# Patient Record
Sex: MALE | Race: WHITE | Employment: FULL TIME | URBAN - NONMETROPOLITAN AREA
[De-identification: names, ages, dates, MRNs, and addresses within clinical notes are randomized per-mention and may not be internally consistent; named-entity substitution may affect disease eponyms.]

---

## 2017-02-20 ENCOUNTER — OFFICE VISIT (OUTPATIENT)
Dept: FAMILY MEDICINE CLINIC | Age: 42
End: 2017-02-20

## 2017-02-20 VITALS
RESPIRATION RATE: 12 BRPM | WEIGHT: 280 LBS | DIASTOLIC BLOOD PRESSURE: 84 MMHG | SYSTOLIC BLOOD PRESSURE: 124 MMHG | HEART RATE: 50 BPM | TEMPERATURE: 98.1 F | BODY MASS INDEX: 35.94 KG/M2 | HEIGHT: 74 IN

## 2017-02-20 DIAGNOSIS — E66.9 OBESITY (BMI 30-39.9): ICD-10-CM

## 2017-02-20 LAB — HBA1C MFR BLD: 8.6 %

## 2017-02-20 PROCEDURE — 83036 HEMOGLOBIN GLYCOSYLATED A1C: CPT | Performed by: FAMILY MEDICINE

## 2017-02-20 PROCEDURE — 99213 OFFICE O/P EST LOW 20 MIN: CPT | Performed by: FAMILY MEDICINE

## 2017-02-20 RX ORDER — PIOGLITAZONEHYDROCHLORIDE 30 MG/1
30 TABLET ORAL DAILY
Qty: 30 TABLET | Refills: 3 | Status: SHIPPED | OUTPATIENT
Start: 2017-02-20 | End: 2017-03-07

## 2017-03-04 ENCOUNTER — PATIENT MESSAGE (OUTPATIENT)
Dept: FAMILY MEDICINE CLINIC | Age: 42
End: 2017-03-04

## 2017-03-07 DIAGNOSIS — E11.9 CONTROLLED TYPE 2 DIABETES MELLITUS WITHOUT COMPLICATION, WITHOUT LONG-TERM CURRENT USE OF INSULIN (HCC): Primary | ICD-10-CM

## 2017-03-07 RX ORDER — GLIPIZIDE 5 MG/1
5 TABLET ORAL DAILY
Qty: 60 TABLET | Refills: 3 | Status: SHIPPED | OUTPATIENT
Start: 2017-03-07 | End: 2017-05-23 | Stop reason: SDUPTHER

## 2017-03-31 ENCOUNTER — OFFICE VISIT (OUTPATIENT)
Dept: BARIATRICS/WEIGHT MGMT | Age: 42
End: 2017-03-31

## 2017-03-31 VITALS
WEIGHT: 280.8 LBS | RESPIRATION RATE: 18 BRPM | BODY MASS INDEX: 36.04 KG/M2 | HEIGHT: 74 IN | SYSTOLIC BLOOD PRESSURE: 140 MMHG | DIASTOLIC BLOOD PRESSURE: 86 MMHG | HEART RATE: 64 BPM | TEMPERATURE: 97.7 F

## 2017-03-31 DIAGNOSIS — I10 ESSENTIAL HYPERTENSION: ICD-10-CM

## 2017-03-31 DIAGNOSIS — E11.8 TYPE 2 DIABETES MELLITUS WITH COMPLICATION, UNSPECIFIED LONG TERM INSULIN USE STATUS: ICD-10-CM

## 2017-03-31 DIAGNOSIS — E66.9 OBESITY (BMI 30-39.9): Primary | ICD-10-CM

## 2017-03-31 DIAGNOSIS — G89.29 CHRONIC BILATERAL LOW BACK PAIN WITHOUT SCIATICA: ICD-10-CM

## 2017-03-31 DIAGNOSIS — E78.00 HYPERCHOLESTEREMIA: ICD-10-CM

## 2017-03-31 DIAGNOSIS — M54.50 CHRONIC BILATERAL LOW BACK PAIN WITHOUT SCIATICA: ICD-10-CM

## 2017-03-31 PROCEDURE — 99203 OFFICE O/P NEW LOW 30 MIN: CPT | Performed by: SURGERY

## 2017-03-31 ASSESSMENT — ENCOUNTER SYMPTOMS
APNEA: 0
SHORTNESS OF BREATH: 1
EYES NEGATIVE: 1
ALLERGIC/IMMUNOLOGIC NEGATIVE: 1
BACK PAIN: 1
GASTROINTESTINAL NEGATIVE: 1

## 2017-04-10 ENCOUNTER — OFFICE VISIT (OUTPATIENT)
Dept: BARIATRICS/WEIGHT MGMT | Age: 42
End: 2017-04-10

## 2017-04-10 DIAGNOSIS — E66.9 OBESITY, UNSPECIFIED OBESITY SEVERITY, UNSPECIFIED OBESITY TYPE: Primary | ICD-10-CM

## 2017-04-18 DIAGNOSIS — E66.9 OBESITY, CLASS II, BMI 35-39.9: ICD-10-CM

## 2017-04-18 DIAGNOSIS — E11.9 CONTROLLED TYPE 2 DIABETES MELLITUS WITHOUT COMPLICATION, WITHOUT LONG-TERM CURRENT USE OF INSULIN (HCC): Primary | ICD-10-CM

## 2017-04-18 DIAGNOSIS — E78.5 HYPERLIPIDEMIA, UNSPECIFIED HYPERLIPIDEMIA TYPE: ICD-10-CM

## 2017-04-18 DIAGNOSIS — Z13.21 MALNUTRITION SCREEN: ICD-10-CM

## 2017-04-18 DIAGNOSIS — Z01.818 PRE-OP TESTING: ICD-10-CM

## 2017-04-27 DIAGNOSIS — E11.9 CONTROLLED TYPE 2 DIABETES MELLITUS WITHOUT COMPLICATION, WITHOUT LONG-TERM CURRENT USE OF INSULIN (HCC): ICD-10-CM

## 2017-04-27 RX ORDER — GLUCOSAMINE HCL/CHONDROITIN SU 500-400 MG
CAPSULE ORAL
Qty: 100 STRIP | Refills: 2 | Status: SHIPPED | OUTPATIENT
Start: 2017-04-27 | End: 2017-12-08

## 2017-05-08 ENCOUNTER — OFFICE VISIT (OUTPATIENT)
Dept: BARIATRICS/WEIGHT MGMT | Age: 42
End: 2017-05-08

## 2017-05-08 VITALS
WEIGHT: 281 LBS | HEIGHT: 74 IN | HEART RATE: 60 BPM | DIASTOLIC BLOOD PRESSURE: 92 MMHG | SYSTOLIC BLOOD PRESSURE: 128 MMHG | TEMPERATURE: 97.9 F | BODY MASS INDEX: 36.06 KG/M2

## 2017-05-08 DIAGNOSIS — E66.9 OBESITY, CLASS II, BMI 35-39.9: Primary | ICD-10-CM

## 2017-05-08 DIAGNOSIS — E55.9 VITAMIN D DEFICIENCY: ICD-10-CM

## 2017-05-08 DIAGNOSIS — E66.9 OBESITY, UNSPECIFIED OBESITY SEVERITY, UNSPECIFIED OBESITY TYPE: Primary | ICD-10-CM

## 2017-05-08 PROCEDURE — 99213 OFFICE O/P EST LOW 20 MIN: CPT | Performed by: PHYSICIAN ASSISTANT

## 2017-05-08 RX ORDER — CHOLECALCIFEROL (VITAMIN D3) 1250 MCG
1 CAPSULE ORAL WEEKLY
Qty: 4 CAPSULE | Refills: 1 | Status: SHIPPED | OUTPATIENT
Start: 2017-05-08 | End: 2017-08-16

## 2017-05-23 ENCOUNTER — OFFICE VISIT (OUTPATIENT)
Dept: FAMILY MEDICINE CLINIC | Age: 42
End: 2017-05-23

## 2017-05-23 VITALS
HEIGHT: 74 IN | BODY MASS INDEX: 36.29 KG/M2 | TEMPERATURE: 97.7 F | WEIGHT: 282.8 LBS | RESPIRATION RATE: 12 BRPM | SYSTOLIC BLOOD PRESSURE: 124 MMHG | DIASTOLIC BLOOD PRESSURE: 68 MMHG | HEART RATE: 68 BPM

## 2017-05-23 DIAGNOSIS — R80.9 MICROALBUMINURIA DUE TO TYPE 2 DIABETES MELLITUS (HCC): ICD-10-CM

## 2017-05-23 DIAGNOSIS — F33.41 RECURRENT MAJOR DEPRESSIVE DISORDER, IN PARTIAL REMISSION (HCC): ICD-10-CM

## 2017-05-23 DIAGNOSIS — E11.29 MICROALBUMINURIA DUE TO TYPE 2 DIABETES MELLITUS (HCC): ICD-10-CM

## 2017-05-23 LAB
CREATININE URINE POCT: ABNORMAL
HBA1C MFR BLD: 9 %
MICROALBUMIN/CREAT 24H UR: ABNORMAL MG/G{CREAT}
MICROALBUMIN/CREAT UR-RTO: ABNORMAL

## 2017-05-23 PROCEDURE — 83036 HEMOGLOBIN GLYCOSYLATED A1C: CPT | Performed by: FAMILY MEDICINE

## 2017-05-23 PROCEDURE — 82044 UR ALBUMIN SEMIQUANTITATIVE: CPT | Performed by: FAMILY MEDICINE

## 2017-05-23 PROCEDURE — 99214 OFFICE O/P EST MOD 30 MIN: CPT | Performed by: FAMILY MEDICINE

## 2017-05-23 RX ORDER — LISINOPRIL 2.5 MG/1
2.5 TABLET ORAL DAILY
Qty: 30 TABLET | Refills: 5 | Status: SHIPPED | OUTPATIENT
Start: 2017-05-23 | End: 2017-11-13 | Stop reason: SDUPTHER

## 2017-05-23 RX ORDER — GLIPIZIDE 10 MG/1
10 TABLET ORAL DAILY
Qty: 30 TABLET | Refills: 5 | Status: SHIPPED | OUTPATIENT
Start: 2017-05-23 | End: 2017-08-25 | Stop reason: DRUGHIGH

## 2017-06-09 DIAGNOSIS — E11.9 CONTROLLED TYPE 2 DIABETES MELLITUS WITHOUT COMPLICATION, WITHOUT LONG-TERM CURRENT USE OF INSULIN (HCC): ICD-10-CM

## 2017-06-15 ENCOUNTER — TELEPHONE (OUTPATIENT)
Dept: FAMILY MEDICINE CLINIC | Age: 42
End: 2017-06-15

## 2017-06-16 ENCOUNTER — OFFICE VISIT (OUTPATIENT)
Dept: BARIATRICS/WEIGHT MGMT | Age: 42
End: 2017-06-16

## 2017-06-16 VITALS
TEMPERATURE: 97.8 F | DIASTOLIC BLOOD PRESSURE: 78 MMHG | HEIGHT: 74 IN | HEART RATE: 78 BPM | RESPIRATION RATE: 18 BRPM | BODY MASS INDEX: 36.14 KG/M2 | SYSTOLIC BLOOD PRESSURE: 124 MMHG | WEIGHT: 281.6 LBS

## 2017-06-16 DIAGNOSIS — E66.9 OBESITY, UNSPECIFIED OBESITY SEVERITY, UNSPECIFIED OBESITY TYPE: Primary | ICD-10-CM

## 2017-06-16 DIAGNOSIS — I10 ESSENTIAL HYPERTENSION: ICD-10-CM

## 2017-06-16 DIAGNOSIS — F32.A DEPRESSION, UNSPECIFIED DEPRESSION TYPE: ICD-10-CM

## 2017-06-16 DIAGNOSIS — E55.9 VITAMIN D DEFICIENCY: ICD-10-CM

## 2017-06-16 DIAGNOSIS — E66.9 OBESITY, CLASS II, BMI 35-39.9: Primary | ICD-10-CM

## 2017-06-16 PROCEDURE — 99213 OFFICE O/P EST LOW 20 MIN: CPT | Performed by: PHYSICIAN ASSISTANT

## 2017-06-29 DIAGNOSIS — F33.41 RECURRENT MAJOR DEPRESSIVE DISORDER, IN PARTIAL REMISSION (HCC): Primary | ICD-10-CM

## 2017-06-29 RX ORDER — FLUOXETINE HYDROCHLORIDE 20 MG/1
20 CAPSULE ORAL DAILY
Qty: 30 CAPSULE | Refills: 5 | Status: SHIPPED | OUTPATIENT
Start: 2017-06-29 | End: 2017-08-16

## 2017-07-19 ENCOUNTER — OFFICE VISIT (OUTPATIENT)
Dept: BARIATRICS/WEIGHT MGMT | Age: 42
End: 2017-07-19

## 2017-07-19 ENCOUNTER — OFFICE VISIT (OUTPATIENT)
Dept: BARIATRICS/WEIGHT MGMT | Age: 42
End: 2017-07-19
Payer: COMMERCIAL

## 2017-07-19 VITALS
HEIGHT: 74 IN | SYSTOLIC BLOOD PRESSURE: 110 MMHG | DIASTOLIC BLOOD PRESSURE: 80 MMHG | TEMPERATURE: 98.1 F | BODY MASS INDEX: 35.75 KG/M2 | HEART RATE: 60 BPM | WEIGHT: 278.6 LBS

## 2017-07-19 DIAGNOSIS — F32.A DEPRESSION, UNSPECIFIED DEPRESSION TYPE: ICD-10-CM

## 2017-07-19 DIAGNOSIS — E55.9 VITAMIN D DEFICIENCY: ICD-10-CM

## 2017-07-19 DIAGNOSIS — E66.9 OBESITY, CLASS II, BMI 35-39.9: Primary | ICD-10-CM

## 2017-07-19 DIAGNOSIS — I10 ESSENTIAL HYPERTENSION: ICD-10-CM

## 2017-07-19 PROCEDURE — 99213 OFFICE O/P EST LOW 20 MIN: CPT | Performed by: PHYSICIAN ASSISTANT

## 2017-08-16 ENCOUNTER — OFFICE VISIT (OUTPATIENT)
Dept: BARIATRICS/WEIGHT MGMT | Age: 42
End: 2017-08-16
Payer: COMMERCIAL

## 2017-08-16 ENCOUNTER — OFFICE VISIT (OUTPATIENT)
Dept: BARIATRICS/WEIGHT MGMT | Age: 42
End: 2017-08-16

## 2017-08-16 VITALS
RESPIRATION RATE: 18 BRPM | SYSTOLIC BLOOD PRESSURE: 110 MMHG | DIASTOLIC BLOOD PRESSURE: 76 MMHG | HEART RATE: 60 BPM | BODY MASS INDEX: 35.68 KG/M2 | TEMPERATURE: 97.8 F | HEIGHT: 74 IN | WEIGHT: 278 LBS

## 2017-08-16 DIAGNOSIS — E66.01 MORBID OBESITY, UNSPECIFIED OBESITY TYPE (HCC): Primary | ICD-10-CM

## 2017-08-16 DIAGNOSIS — I10 ESSENTIAL HYPERTENSION: ICD-10-CM

## 2017-08-16 DIAGNOSIS — E55.9 VITAMIN D DEFICIENCY: ICD-10-CM

## 2017-08-16 DIAGNOSIS — E66.01 SEVERE OBESITY (BMI 35.0-39.9) WITH COMORBIDITY (HCC): Primary | ICD-10-CM

## 2017-08-16 DIAGNOSIS — E78.5 HYPERLIPIDEMIA, UNSPECIFIED HYPERLIPIDEMIA TYPE: ICD-10-CM

## 2017-08-16 DIAGNOSIS — F32.A DEPRESSION, UNSPECIFIED DEPRESSION TYPE: ICD-10-CM

## 2017-08-16 PROCEDURE — 99213 OFFICE O/P EST LOW 20 MIN: CPT | Performed by: PHYSICIAN ASSISTANT

## 2017-08-25 ENCOUNTER — TELEPHONE (OUTPATIENT)
Dept: BARIATRICS/WEIGHT MGMT | Age: 42
End: 2017-08-25

## 2017-08-25 ENCOUNTER — OFFICE VISIT (OUTPATIENT)
Dept: FAMILY MEDICINE CLINIC | Age: 42
End: 2017-08-25
Payer: COMMERCIAL

## 2017-08-25 VITALS
HEIGHT: 74 IN | SYSTOLIC BLOOD PRESSURE: 128 MMHG | BODY MASS INDEX: 36.06 KG/M2 | RESPIRATION RATE: 10 BRPM | DIASTOLIC BLOOD PRESSURE: 78 MMHG | TEMPERATURE: 97.9 F | WEIGHT: 281 LBS | HEART RATE: 56 BPM

## 2017-08-25 DIAGNOSIS — I10 ESSENTIAL HYPERTENSION: ICD-10-CM

## 2017-08-25 DIAGNOSIS — E11.29 MICROALBUMINURIA DUE TO TYPE 2 DIABETES MELLITUS (HCC): ICD-10-CM

## 2017-08-25 DIAGNOSIS — F33.41 RECURRENT MAJOR DEPRESSIVE DISORDER, IN PARTIAL REMISSION (HCC): ICD-10-CM

## 2017-08-25 DIAGNOSIS — R80.9 MICROALBUMINURIA DUE TO TYPE 2 DIABETES MELLITUS (HCC): ICD-10-CM

## 2017-08-25 LAB — HBA1C MFR BLD: 9 %

## 2017-08-25 PROCEDURE — 83036 HEMOGLOBIN GLYCOSYLATED A1C: CPT | Performed by: FAMILY MEDICINE

## 2017-08-25 PROCEDURE — 99214 OFFICE O/P EST MOD 30 MIN: CPT | Performed by: FAMILY MEDICINE

## 2017-08-25 RX ORDER — GLIPIZIDE 10 MG/1
10 TABLET ORAL
Qty: 60 TABLET | Refills: 5 | Status: SHIPPED | OUTPATIENT
Start: 2017-08-25 | End: 2017-10-16 | Stop reason: SDUPTHER

## 2017-08-25 ASSESSMENT — PATIENT HEALTH QUESTIONNAIRE - PHQ9
2. FEELING DOWN, DEPRESSED OR HOPELESS: 0
1. LITTLE INTEREST OR PLEASURE IN DOING THINGS: 0
SUM OF ALL RESPONSES TO PHQ QUESTIONS 1-9: 0
SUM OF ALL RESPONSES TO PHQ9 QUESTIONS 1 & 2: 0

## 2017-09-18 ENCOUNTER — OFFICE VISIT (OUTPATIENT)
Dept: BARIATRICS/WEIGHT MGMT | Age: 42
End: 2017-09-18
Payer: COMMERCIAL

## 2017-09-18 ENCOUNTER — HOSPITAL ENCOUNTER (OUTPATIENT)
Dept: GENERAL RADIOLOGY | Age: 42
Discharge: HOME | End: 2017-09-18
Payer: COMMERCIAL

## 2017-09-18 ENCOUNTER — HOSPITAL ENCOUNTER (OUTPATIENT)
Age: 42
Discharge: HOME | End: 2017-09-18
Payer: COMMERCIAL

## 2017-09-18 ENCOUNTER — OFFICE VISIT (OUTPATIENT)
Dept: BARIATRICS/WEIGHT MGMT | Age: 42
End: 2017-09-18

## 2017-09-18 VITALS
RESPIRATION RATE: 18 BRPM | TEMPERATURE: 98.4 F | WEIGHT: 277.2 LBS | SYSTOLIC BLOOD PRESSURE: 144 MMHG | DIASTOLIC BLOOD PRESSURE: 90 MMHG | BODY MASS INDEX: 35.58 KG/M2 | HEIGHT: 74 IN | HEART RATE: 61 BPM

## 2017-09-18 DIAGNOSIS — E66.01 SEVERE OBESITY (BMI 35.0-39.9) WITH COMORBIDITY (HCC): ICD-10-CM

## 2017-09-18 DIAGNOSIS — E78.5 HYPERLIPIDEMIA, UNSPECIFIED HYPERLIPIDEMIA TYPE: ICD-10-CM

## 2017-09-18 DIAGNOSIS — E66.01 SEVERE OBESITY (BMI 35.0-39.9) WITH COMORBIDITY (HCC): Primary | ICD-10-CM

## 2017-09-18 DIAGNOSIS — F32.A DEPRESSION, UNSPECIFIED DEPRESSION TYPE: ICD-10-CM

## 2017-09-18 DIAGNOSIS — E55.9 VITAMIN D DEFICIENCY: ICD-10-CM

## 2017-09-18 DIAGNOSIS — I10 ESSENTIAL HYPERTENSION: ICD-10-CM

## 2017-09-18 LAB — VITAMIN D 25-HYDROXY: 26 NG/ML (ref 30–100)

## 2017-09-18 PROCEDURE — 36415 COLL VENOUS BLD VENIPUNCTURE: CPT

## 2017-09-18 PROCEDURE — 71020 XR CHEST STANDARD TWO VW: CPT

## 2017-09-18 PROCEDURE — 99213 OFFICE O/P EST LOW 20 MIN: CPT | Performed by: PHYSICIAN ASSISTANT

## 2017-09-18 PROCEDURE — 82306 VITAMIN D 25 HYDROXY: CPT

## 2017-09-18 RX ORDER — FLUOXETINE HYDROCHLORIDE 20 MG/1
20 CAPSULE ORAL DAILY
COMMUNITY
End: 2017-11-13 | Stop reason: SDUPTHER

## 2017-09-20 ENCOUNTER — TELEPHONE (OUTPATIENT)
Dept: BARIATRICS/WEIGHT MGMT | Age: 42
End: 2017-09-20

## 2017-10-13 ENCOUNTER — OFFICE VISIT (OUTPATIENT)
Dept: BARIATRICS/WEIGHT MGMT | Age: 42
End: 2017-10-13
Payer: COMMERCIAL

## 2017-10-13 ENCOUNTER — OFFICE VISIT (OUTPATIENT)
Dept: BARIATRICS/WEIGHT MGMT | Age: 42
End: 2017-10-13

## 2017-10-13 VITALS
HEIGHT: 74 IN | TEMPERATURE: 97.5 F | DIASTOLIC BLOOD PRESSURE: 70 MMHG | SYSTOLIC BLOOD PRESSURE: 126 MMHG | RESPIRATION RATE: 18 BRPM | BODY MASS INDEX: 35.88 KG/M2 | WEIGHT: 279.6 LBS | HEART RATE: 60 BPM

## 2017-10-13 DIAGNOSIS — E55.9 VITAMIN D DEFICIENCY: ICD-10-CM

## 2017-10-13 DIAGNOSIS — Z01.818 PRE-OP TESTING: ICD-10-CM

## 2017-10-13 DIAGNOSIS — G89.29 CHRONIC BILATERAL LOW BACK PAIN WITHOUT SCIATICA: ICD-10-CM

## 2017-10-13 DIAGNOSIS — I10 ESSENTIAL HYPERTENSION: ICD-10-CM

## 2017-10-13 DIAGNOSIS — M54.50 CHRONIC BILATERAL LOW BACK PAIN WITHOUT SCIATICA: ICD-10-CM

## 2017-10-13 DIAGNOSIS — E78.5 HYPERLIPIDEMIA, UNSPECIFIED HYPERLIPIDEMIA TYPE: ICD-10-CM

## 2017-10-13 DIAGNOSIS — Z13.21 MALNUTRITION SCREEN: ICD-10-CM

## 2017-10-13 DIAGNOSIS — F32.A DEPRESSION, UNSPECIFIED DEPRESSION TYPE: ICD-10-CM

## 2017-10-13 PROCEDURE — 99213 OFFICE O/P EST LOW 20 MIN: CPT | Performed by: SURGERY

## 2017-10-13 ASSESSMENT — ENCOUNTER SYMPTOMS
EYES NEGATIVE: 1
GASTROINTESTINAL NEGATIVE: 1
ALLERGIC/IMMUNOLOGIC NEGATIVE: 1
BACK PAIN: 1
RESPIRATORY NEGATIVE: 1

## 2017-10-13 NOTE — PROGRESS NOTES
Assessment: Patient is a 38 y.o. male seen for   month six  follow up MNT visit for  pre op bariatric surgery desires sleeve    Vitals from current and previous visits:  Vitals 10/13/2017 7/69/3728   SYSTOLIC 197 851   DIASTOLIC 70 90   Site Right Arm Right Arm   Position Sitting Sitting   Cuff Size Large Adult Large Adult   Pulse 60 61   Temp 97.5 98.4   Resp 18 18   Weight 279 lb 9.6 oz 277 lb 3.2 oz   Height 6' 1.5\" 6' 1.5\"   BMI (wt*703/ht~2) 36.38 kg/m2 36.07 kg/m2       Initial weight at start of Weight Management Program was: 280 lbs     Zaid Ponce gained 2 lbs over one month  -Weight goal: lose weight.     -Nutritionally relevant labs:   Lab Results   Component Value Date/Time    LABA1C 9.0 08/25/2017 08:08 AM    LABA1C 9.0 05/23/2017 09:23 AM    LABA1C 9.3 (H) 05/01/2017 03:09 PM    GLUCOSE 122 (H) 05/01/2017 03:07 PM    GLUCOSE 246 (H) 05/18/2016 11:37 AM    GLUCOSE 132 (H) 07/15/2011 09:05 AM    CHOL 228 (H) 05/01/2017 03:07 PM    HDL 40 05/01/2017 03:07 PM    LDLCALC 150 05/01/2017 03:07 PM    TRIG 192 05/01/2017 03:07 PM                  Lab Results   Component Value Date    VITD25 26 09/18/2017         - Is patient taking daily Multivitamin:  Taking a daily MVI and 1,000 Vitamin D3 daily. States there are 1,000 IU Vitamin D in his MVI as well for total of 2,000 IU's Vitamin D daily. Zaid Ponce reports he is checking his BS 1-2 x/day. Last night at 1am BS was 284 and this AM FBS was 127. He is taking 2,000 mg metformin a day and glipizide twice a day and [place] and [place]. -Food Recall: Reports typically eating three times/day. Breakfast: 7a- hard boiled egg OR apple OR banana. Lunch: 2p- cold meat sandwich on white bread. Dinner: Yesterday- spaghetti (1 cup pasta) with meat sauce, 1 piece bread (60 grams carb total). Snacks: Reports very little snacking. Main Beverages: >64 oz water/day, occ. Milk, denies drinking pop over the past month  -Impression of Dietary Intake: on average, 3 meals per day.  - Pt  is

## 2017-10-13 NOTE — PROGRESS NOTES
BMI 36.39 kg/m²     Body mass index is 36.39 kg/m². Objective:   Physical Exam   Constitutional: He is oriented to person, place, and time. He appears well-developed and well-nourished. No distress. HENT:   Head: Normocephalic and atraumatic. Right Ear: External ear normal.   Left Ear: External ear normal.   Nose: Nose normal.   Mouth/Throat: Oropharynx is clear and moist and mucous membranes are normal. No oropharyngeal exudate. Eyes: Conjunctivae and EOM are normal. Pupils are equal, round, and reactive to light. Right eye exhibits no discharge. Left eye exhibits no discharge. No scleral icterus. Neck: Trachea normal, normal range of motion, full passive range of motion without pain and phonation normal. Neck supple. No JVD present. Cardiovascular: Normal rate, regular rhythm, S1 normal, S2 normal and normal heart sounds. Exam reveals no gallop and no friction rub. No murmur heard. Pulmonary/Chest: Effort normal and breath sounds normal. No respiratory distress. He has no decreased breath sounds. He has no wheezes. He has no rhonchi. He has no rales. He exhibits no tenderness and no deformity. Abdominal: Soft. Bowel sounds are normal. He exhibits no distension, no abdominal bruit and no mass. There is no hepatosplenomegaly. There is no tenderness. There is no rigidity, no rebound and no guarding. No hernia. Hernia confirmed negative in the ventral area. Musculoskeletal: Normal range of motion. He exhibits no edema or tenderness. Neurological: He is alert and oriented to person, place, and time. He has normal strength. No cranial nerve deficit or sensory deficit. Skin: Skin is warm and dry. No rash noted. He is not diaphoretic. No erythema. No pallor. Psychiatric: He has a normal mood and affect. His speech is normal and behavior is normal. Judgment and thought content normal. Cognition and memory are normal.   Vitals reviewed.     CBC   Lab Results   Component Value Date    WBC 7.0 05/01/2017    RBC 5.31 05/01/2017    HGB 16.3 05/01/2017    HCT 48.5 05/01/2017    MCV 91.2 05/01/2017    MCH 30.6 05/01/2017    MCHC 33.6 05/01/2017    RDW 13.6 05/01/2017     05/01/2017    MPV 9.2 05/01/2017    RBCMORP NORMAL 05/01/2017    SEGSPCT 54.3 05/01/2017    LABLYMP 33.3 05/01/2017    MONOPCT 11.1 05/01/2017    LABEOS 0.8 05/01/2017    BASO 0.5 05/01/2017    NRBC 0 05/01/2017    SEGSABS 3.8 05/01/2017    LYMPHSABS 2.3 05/01/2017    MONOSABS 0.8 05/01/2017    EOSABS 0.1 05/01/2017    BASOSABS 0.0 05/01/2017        BMP/CMP   Lab Results   Component Value Date    GLUCOSE 122 05/01/2017    GLUCOSE 132 07/15/2011    CREATININE 0.9 05/01/2017    BUN 12 05/01/2017     05/01/2017    K 4.0 05/01/2017    CL 96 05/01/2017    CO2 24 05/01/2017    CALCIUM 9.7 05/01/2017    AST 34 05/01/2017    ALKPHOS 58 05/01/2017    PROT 7.5 05/01/2017    LABALBU 4.5 05/01/2017    LABALBU 4.6 07/15/2011    BILITOT 0.6 05/01/2017    ALT 65 05/01/2017        PREALBUMIN   Lab Results   Component Value Date    PREALBUMIN 31.5 05/01/2017        VITAMIN B12   Lab Results   Component Value Date    MBGQOZYE33 746 05/01/2017        24 HOUR URINE CALCIUM   No results found for: Yandel Rivera, CALCIUMUR     VITAMIN D   Lab Results   Component Value Date    VITD25 26 09/18/2017        VITAMIN B1/ THIAMINE   Lab Results   Component Value Date    [id number] 78 05/01/2017        RBC FOLATE   Lab Results   Component Value Date    FOLATE 13.0 05/01/2017        LIPID SCREEN (FASTING)   Lab Results   Component Value Date    CHOL 228 05/01/2017    TRIG 192 05/01/2017    HDL 40 05/01/2017    LDLCALC 150 05/01/2017   ,     HGA1C (T2DM ONLY)   Lab Results   Component Value Date    LABA1C 9.0 08/25/2017    AVGG 219 05/01/2017        TSH   Lab Results   Component Value Date    TSH 1.600 05/01/2017        IRON   Lab Results   Component Value Date    IRON 90 05/01/2017        IONIZED CALCIUM   No results found for: Serafin Payan - Narrative   PROCEDURE: XR CHEST STANDARD TWO VW       CLINICAL INFORMATION: Severe obesity (BMI 35.0-39. 9) with comorbidity [facility])       COMPARISON: 3/4/2014       TECHNIQUE: PA and lateral views of the chest were obtained.               Impression   Normal chest. No acute findings.         EGD: in process    Patient Active Problem List   Diagnosis    Uncontrolled type 2 diabetes mellitus, without long-term current use of insulin ([address]    Microalbuminuria due to type 2 diabetes mellitus ([address]    Recurrent major depressive disorder, in partial remission ([address]    Vitamin D deficiency    Depression    Essential hypertension    Uncontrolled type 2 diabetes mellitus without complication, without long-term current use of insulin ([facility])     Assessment:   1. Obesity (BMI 36)  2. Diabetes mellitus  3. Hypercholesterolemia  4. Hypertension  5. Lower back pain  6. Vitamin D deficiency  7. Depression  Plan:   1. Discussion again about the pros and cons of weight loss surgery. The risks benefits and alternatives to laparoscopic adjustable band, gastric sleeve and gastric Dennys-en-Y bypass were discussed in detail. The pros and cons of robotic assisted, laparoscopic and open techniques were discussed. 2.  Behavior modification discussed in detail in regards to dietary habits. 3.  Nutritional education occurred during visit. Follow-up with dietitian for further evaluation. and continue to follow their recommendations as directed  4. Options for medical management of morbid obesity discussed. 5.  Improvement in fitness/exercise discussed with patient and the need for this with/without surgery. 6.  Medical necessity letter from PCP. 7.  Follow-up in one month at weight management program at [address] [place] [address]. 8.  Signs and symptoms reviewed with patient that would be concerning and need him to return to office for re-evaluation. Patient states he will call if he has questions or concerns.    9. Multivitamin  10. Psychology evaluation completed. Follow-up as needed. 11. EGD prior to any surgical intervention  12. Encouraged support groups  13. Discussed the pros and cons with possible side effects in the weight loss medication  14. Discuss with PCP about possible endocrinology evaluation for diabetes as it appears to still be somewhat uncontrolled. Patient in need of hemoglobin A1c 7 or below for surgical intervention. -- Satya Pa states that he has not been able to lose enough adequate excess body weight with medical management only and would like to proceed with a sleeve gastrectomy     -- more than 15 minutes spent with patient today. Greater than 50% of the time was involved with education, counseling and correlating care.

## 2017-10-13 NOTE — PATIENT INSTRUCTIONS
1. ) Continue to exercise five days a week for 15-20 minutes. 2.) Work on taking 20-30 minutes at meals. 3.) Avoid drinking liquids with solids. Wait 30 minutes before and 30 minutes after meals to drink fluids. 4.) Include a lean protein at every meal and snack. Rmemeber to eat protein foods first, followed by non-starchy vegetables or fruit, then starch last.   5.) Continue to drink 64 oz of water a day.

## 2017-10-16 RX ORDER — GLIPIZIDE 10 MG/1
10 TABLET ORAL
Qty: 60 TABLET | Refills: 5 | Status: SHIPPED | OUTPATIENT
Start: 2017-10-16 | End: 2017-11-13 | Stop reason: SDUPTHER

## 2017-10-16 NOTE — TELEPHONE ENCOUNTER
From: Kallie Quezada Parent  Sent: 10/16/2017 9:06 AM EDT  Subject: Medication Renewal Request    Kallie Quezada. Parent would like a refill of the following medications:  glipiZIDE (GLUCOTROL) 10 MG tablet Kiki Robbins DO]    [facility] pharmacy: [facility] [place] [address] [address], [address] [place] [address]    Comment:

## 2017-10-30 ENCOUNTER — TELEPHONE (OUTPATIENT)
Dept: BARIATRICS/WEIGHT MGMT | Age: 42
End: 2017-10-30

## 2017-10-30 ENCOUNTER — HOSPITAL ENCOUNTER (OUTPATIENT)
Age: 42
Discharge: HOME | End: 2017-10-30
Payer: COMMERCIAL

## 2017-10-30 DIAGNOSIS — Z01.818 PRE-OP TESTING: ICD-10-CM

## 2017-10-30 DIAGNOSIS — Z13.21 MALNUTRITION SCREEN: ICD-10-CM

## 2017-10-30 LAB
ANION GAP SERPL CALCULATED.3IONS-SCNC: 15 MEQ/L (ref 8–16)
AVERAGE GLUCOSE: 186 MG/DL (ref 70–126)
BASOPHILS # BLD: 0.3 %
BASOPHILS ABSOLUTE: 0 THOU/MM3 (ref 0–0.1)
BUN BLDV-MCNC: 10 MG/DL (ref 7–22)
CALCIUM SERPL-MCNC: 9.1 MG/DL (ref 8.5–10.5)
CHLORIDE BLD-SCNC: 100 MEQ/L (ref 98–111)
CO2: 25 MEQ/L (ref 23–33)
CREAT SERPL-MCNC: 0.9 MG/DL (ref 0.4–1.2)
EOSINOPHIL # BLD: 0.9 %
EOSINOPHILS ABSOLUTE: 0.1 THOU/MM3 (ref 0–0.4)
GFR SERPL CREATININE-BSD FRML MDRD: > 90 ML/MIN/1.73M2
GLUCOSE BLD-MCNC: 95 MG/DL (ref 70–108)
HBA1C MFR BLD: 8.2 % (ref 4.4–6.4)
HCT VFR BLD CALC: 46.2 % (ref 42–52)
HEMOGLOBIN: 16.1 GM/DL (ref 14–18)
LYMPHOCYTES # BLD: 46.3 %
LYMPHOCYTES ABSOLUTE: 3.8 THOU/MM3 (ref 1–4.8)
MCH RBC QN AUTO: 31.9 PG (ref 27–31)
MCHC RBC AUTO-ENTMCNC: 34.9 GM/DL (ref 33–37)
MCV RBC AUTO: 91.4 FL (ref 80–94)
MONOCYTES # BLD: 8 %
MONOCYTES ABSOLUTE: 0.6 THOU/MM3 (ref 0.4–1.3)
NUCLEATED RED BLOOD CELLS: 0 /100 WBC
PDW BLD-RTO: 12.7 % (ref 11.5–14.5)
PLATELET # BLD: 150 THOU/MM3 (ref 130–400)
PMV BLD AUTO: 9.6 MCM (ref 7.4–10.4)
POTASSIUM SERPL-SCNC: 4.3 MEQ/L (ref 3.5–5.2)
RBC # BLD: 5.05 MILL/MM3 (ref 4.7–6.1)
SEG NEUTROPHILS: 44.5 %
SEGMENTED NEUTROPHILS ABSOLUTE COUNT: 3.6 THOU/MM3 (ref 1.8–7.7)
SODIUM BLD-SCNC: 140 MEQ/L (ref 135–145)
WBC # BLD: 8.1 THOU/MM3 (ref 4.8–10.8)

## 2017-10-30 PROCEDURE — 83036 HEMOGLOBIN GLYCOSYLATED A1C: CPT

## 2017-10-30 PROCEDURE — 80048 BASIC METABOLIC PNL TOTAL CA: CPT

## 2017-10-30 PROCEDURE — 85025 COMPLETE CBC W/AUTO DIFF WBC: CPT

## 2017-10-30 PROCEDURE — 36415 COLL VENOUS BLD VENIPUNCTURE: CPT

## 2017-10-30 NOTE — TELEPHONE ENCOUNTER
[facility] sent PCP medical clearance forms on October17- we have not received either forms back,we also need HgbA1C result to send in with your clinical notes to [facility] for review PA . Those items are needed to start the insurance submission for PA for bariatric surgery. Please call me [phone].

## 2017-11-06 ENCOUNTER — TELEPHONE (OUTPATIENT)
Dept: FAMILY MEDICINE CLINIC | Age: 42
End: 2017-11-06

## 2017-11-06 NOTE — TELEPHONE ENCOUNTER
11/6/17   Dolv: 8/25/17   Hali Bower at [facility], requesting the pre op form that was faxed 10/17/17. [facility][phone].    Thanks/blm

## 2017-11-13 DIAGNOSIS — R80.9 MICROALBUMINURIA DUE TO TYPE 2 DIABETES MELLITUS (HCC): ICD-10-CM

## 2017-11-13 DIAGNOSIS — E11.9 CONTROLLED TYPE 2 DIABETES MELLITUS WITHOUT COMPLICATION, WITHOUT LONG-TERM CURRENT USE OF INSULIN (HCC): ICD-10-CM

## 2017-11-13 DIAGNOSIS — E11.29 MICROALBUMINURIA DUE TO TYPE 2 DIABETES MELLITUS (HCC): ICD-10-CM

## 2017-11-13 RX ORDER — ISOPROPYL ALCOHOL 0.7 ML/1
1 SWAB TOPICAL DAILY
COMMUNITY
End: 2017-11-13 | Stop reason: SDUPTHER

## 2017-11-13 RX ORDER — FLUOXETINE HYDROCHLORIDE 20 MG/1
20 CAPSULE ORAL DAILY
Qty: 30 CAPSULE | Refills: 5 | Status: SHIPPED | OUTPATIENT
Start: 2017-11-13 | End: 2018-01-02 | Stop reason: SDUPTHER

## 2017-11-13 RX ORDER — LISINOPRIL 2.5 MG/1
2.5 TABLET ORAL DAILY
Qty: 30 TABLET | Refills: 5 | Status: SHIPPED | OUTPATIENT
Start: 2017-11-13 | End: 2017-11-27 | Stop reason: SDUPTHER

## 2017-11-13 RX ORDER — ISOPROPYL ALCOHOL 0.7 ML/1
SWAB TOPICAL
Qty: 100 EACH | Refills: 5 | Status: SHIPPED | OUTPATIENT
Start: 2017-11-13 | End: 2017-12-08

## 2017-11-13 RX ORDER — ASPIRIN 81 MG/1
TABLET ORAL
Qty: 30 TABLET | Refills: 11 | Status: SHIPPED | OUTPATIENT
Start: 2017-11-13 | End: 2017-11-29 | Stop reason: SDUPTHER

## 2017-11-13 RX ORDER — GLIPIZIDE 10 MG/1
10 TABLET ORAL
Qty: 60 TABLET | Refills: 5 | Status: ON HOLD | OUTPATIENT
Start: 2017-11-13 | End: 2017-12-13 | Stop reason: HOSPADM

## 2017-11-20 ENCOUNTER — OFFICE VISIT (OUTPATIENT)
Dept: BARIATRICS/WEIGHT MGMT | Age: 42
End: 2017-11-20
Payer: COMMERCIAL

## 2017-11-20 ENCOUNTER — OFFICE VISIT (OUTPATIENT)
Dept: BARIATRICS/WEIGHT MGMT | Age: 42
End: 2017-11-20

## 2017-11-20 VITALS
HEIGHT: 74 IN | DIASTOLIC BLOOD PRESSURE: 80 MMHG | BODY MASS INDEX: 36.52 KG/M2 | TEMPERATURE: 97.8 F | HEART RATE: 60 BPM | RESPIRATION RATE: 18 BRPM | WEIGHT: 284.6 LBS | SYSTOLIC BLOOD PRESSURE: 126 MMHG

## 2017-11-20 DIAGNOSIS — F32.A DEPRESSION, UNSPECIFIED DEPRESSION TYPE: ICD-10-CM

## 2017-11-20 DIAGNOSIS — E78.5 HYPERLIPIDEMIA, UNSPECIFIED HYPERLIPIDEMIA TYPE: ICD-10-CM

## 2017-11-20 DIAGNOSIS — I10 ESSENTIAL HYPERTENSION: ICD-10-CM

## 2017-11-20 DIAGNOSIS — E66.9 OBESITY, CLASS II, BMI 35-39.9: Primary | ICD-10-CM

## 2017-11-20 DIAGNOSIS — G89.29 CHRONIC BILATERAL LOW BACK PAIN WITHOUT SCIATICA: ICD-10-CM

## 2017-11-20 DIAGNOSIS — M54.50 CHRONIC BILATERAL LOW BACK PAIN WITHOUT SCIATICA: ICD-10-CM

## 2017-11-20 DIAGNOSIS — E55.9 VITAMIN D DEFICIENCY: ICD-10-CM

## 2017-11-20 PROCEDURE — G8417 CALC BMI ABV UP PARAM F/U: HCPCS | Performed by: SURGERY

## 2017-11-20 PROCEDURE — 3045F PR MOST RECENT HEMOGLOBIN A1C LEVEL 7.0-9.0%: CPT | Performed by: SURGERY

## 2017-11-20 PROCEDURE — 99213 OFFICE O/P EST LOW 20 MIN: CPT | Performed by: SURGERY

## 2017-11-20 PROCEDURE — G8427 DOCREV CUR MEDS BY ELIG CLIN: HCPCS | Performed by: SURGERY

## 2017-11-20 PROCEDURE — G8484 FLU IMMUNIZE NO ADMIN: HCPCS | Performed by: SURGERY

## 2017-11-20 PROCEDURE — 1036F TOBACCO NON-USER: CPT | Performed by: SURGERY

## 2017-11-20 RX ORDER — ONDANSETRON 4 MG/1
4 TABLET, FILM COATED ORAL EVERY 4 HOURS PRN
Qty: 30 TABLET | Refills: 0 | Status: SHIPPED | OUTPATIENT
Start: 2017-12-11 | End: 2017-12-25

## 2017-11-20 RX ORDER — ASPIRIN 81 MG
100 TABLET, DELAYED RELEASE (ENTERIC COATED) ORAL 2 TIMES DAILY
Qty: 30 TABLET | Refills: 1 | Status: SHIPPED | OUTPATIENT
Start: 2017-12-11 | End: 2018-01-03

## 2017-11-20 RX ORDER — METOCLOPRAMIDE 10 MG/1
10 TABLET ORAL EVERY 6 HOURS PRN
Qty: 30 TABLET | Refills: 0 | Status: SHIPPED | OUTPATIENT
Start: 2017-12-11 | End: 2018-01-03

## 2017-11-20 RX ORDER — OMEPRAZOLE 40 MG/1
40 CAPSULE, DELAYED RELEASE ORAL DAILY
Qty: 30 CAPSULE | Refills: 2 | Status: SHIPPED | OUTPATIENT
Start: 2017-12-11 | End: 2018-06-21

## 2017-11-20 ASSESSMENT — ENCOUNTER SYMPTOMS
ALLERGIC/IMMUNOLOGIC NEGATIVE: 1
RESPIRATORY NEGATIVE: 1
GASTROINTESTINAL NEGATIVE: 1
EYES NEGATIVE: 1

## 2017-11-20 NOTE — PROGRESS NOTES
Dedrick Rosas gained 4 lbs over six months since joining [facility].  After nutrition evaluation and education, patient is considered to be a good surgical candidate from a MNT perspective. Patient's body composition for pre-op teaching class  was measured using the [facility] Scale. Results were saved and reviewed with the patient. Patient was educated on 2- week pre-operative nutrition protocol and post test completed. Pre-operative and post-operative diet guidelines were discussed and written information was provided. Nectar protein supplements were purchased. Vitamin regimen with Bariatric Advantage and/or Celebrate vitamins was explained, and patient purchased a 90 day supply at this visit. Importance of vitamin compliance was discussed and potential of vitamin deficiencies was reviewed. Encouraged continued physical activity. Patient signed agreement stating they are committed to lifelong follow up, smoking and alcohol cessation, vitamin compliance, and 2 week pre-operative dietary protocol.

## 2017-11-20 NOTE — PROGRESS NOTES
Subjective:     Patient ID: Kaylen Hall Parent is a 38 y.o. male    Chief Complaint   Patient presents with    Follow-up     H&P visit; initial 280lb 12.8oz, last 279.6lb, current 284.6lb     NAEEM Prater is a 59-year-old male who presents for follow-up at the weight management program secondary to his obesity. BMI 37. He has not been able to lose enough adequate excess body weight with medical management only. He wishes to proceed with a robotic sleeve gastrectomy. He has completed psychology evaluation. Completed upper endoscopy. Attended support groups. Attended fitness orientation. Following up with dietitian's. Trying to follow recommendations including improving his nutritional choices and portion control. Still working really hard with improving his diabetes control. Hemoglobin A1c improving. Denying any current chest pain or shortness of breath. No abdominal pain. No hematochezia or melena. No fever, chills or sweats. No new urinary complaints. He is excited to proceed with surgical correction so as to continue his weight loss journey. Review of Systems   Constitutional: Negative. HENT: Negative. Eyes: Negative. Respiratory: Negative. Cardiovascular: Negative. Gastrointestinal: Negative. Endocrine: Negative. Genitourinary: Negative. Musculoskeletal: Negative. Skin: Negative. Allergic/Immunologic: Negative. Neurological: Negative. Hematological: Negative. Psychiatric/Behavioral: Negative.       Past Medical History:   Diagnosis Date    Chronic back pain     Diabetes mellitus [facility]     Hyperlipidemia     Hypertension        Past Surgical History:   Procedure Laterality Date    LYMPHADENECTOMY      1978       Current Outpatient Prescriptions   Medication Sig Dispense Refill    [START ON 12/11/2017] omeprazole (PRILOSEC) 40 MG delayed release capsule Take 1 capsule by mouth daily Open capsule and take in liquid 30 capsule 2    Multiple Vitamin (MULTI Grandmother     Cancer Paternal Grandmother     Heart Disease Paternal Grandfather        Social History     Social History    Marital status:      Spouse name: N/A    Number of children: N/A    Years of education: N/A     Occupational History    Not on file. Social History Main Topics    Smoking status: Former Smoker     Quit date: 2011    Smokeless tobacco: Former User     Types: Chew    Alcohol use No    Drug use: No    Sexual activity: Not on file     Other Topics Concern    Not on file     Social History Narrative    No narrative on file     /80 (Site: Right Arm, Position: Sitting, Cuff Size: Large Adult)   Pulse 60   Temp 97.8 °F (36.6 °C) (Oral)   Resp 18   Ht 6' 1.5\" (1.867 m)   Wt 284 lb 9.6 oz (129.1 kg)   BMI 37.04 kg/m²     Body mass index is 37.04 kg/m². Objective:   Physical Exam   Constitutional: He is oriented to person, place, and time. He appears well-developed and well-nourished. No distress. HENT:   Head: Normocephalic and atraumatic. Right Ear: External ear normal.   Left Ear: External ear normal.   Nose: Nose normal.   Mouth/Throat: Oropharynx is clear and moist and mucous membranes are normal. No oropharyngeal exudate. Eyes: Conjunctivae and EOM are normal. Pupils are equal, round, and reactive to light. Right eye exhibits no discharge. Left eye exhibits no discharge. No scleral icterus. Neck: Trachea normal, normal range of motion, full passive range of motion without pain and phonation normal. Neck supple. No JVD present. Cardiovascular: Normal rate, regular rhythm, S1 normal, S2 normal and normal heart sounds. Exam reveals no gallop and no friction rub. No murmur heard. Pulmonary/Chest: Effort normal and breath sounds normal. No respiratory distress. He has no decreased breath sounds. He has no wheezes. He has no rhonchi. He has no rales. He exhibits no tenderness and no deformity. Abdominal: Soft.  Bowel sounds are normal. He exhibits Theta Peed, CALCIUMUR     VITAMIN D   Lab Results   Component Value Date    VITD25 26 09/18/2017        VITAMIN B1/ THIAMINE   Lab Results   Component Value Date    [id number] 78 05/01/2017        RBC FOLATE   Lab Results   Component Value Date    FOLATE 13.0 05/01/2017        LIPID SCREEN (FASTING)   Lab Results   Component Value Date    CHOL 228 05/01/2017    TRIG 192 05/01/2017    HDL 40 05/01/2017    LDLCALC 150 05/01/2017   ,     HGA1C (T2DM ONLY)   Lab Results   Component Value Date    LABA1C 8.2 10/30/2017    AVGG 186 10/30/2017        TSH   Lab Results   Component Value Date    TSH 1.600 05/01/2017        IRON   Lab Results   Component Value Date    IRON 90 05/01/2017        IONIZED CALCIUM   No results found for: Lubna Pouch     Imaging -   Narrative   PROCEDURE: XR CHEST STANDARD TWO VW       CLINICAL INFORMATION: Severe obesity (BMI 35.0-39. 9) with comorbidity [facility])       COMPARISON: 3/4/2014       TECHNIQUE: PA and lateral views of the chest were obtained.               Impression   Normal chest. No acute findings.          EGD: in process         Patient Active Problem List   Diagnosis    Uncontrolled type 2 diabetes mellitus, without long-term current use of insulin ([address]    Microalbuminuria due to type 2 diabetes mellitus ([address]    Recurrent major depressive disorder, in partial remission ([address]    Vitamin D deficiency    Depression    Essential hypertension    Uncontrolled type 2 diabetes mellitus without complication, without long-term current use of insulin ([facility])       Assessment:   1.  Obesity (BMI 37)  2.  Diabetes mellitus  3.  Hypercholesterolemia  4.  Hypertension  5.  Lower back pain  6. Vitamin D deficiency  7. Depression    Plan:   1.  Discussion again about the pros and cons of weight loss surgery.  The risks benefits and alternatives to laparoscopic adjustable band, gastric sleeve and gastric Dennys-en-Y bypass were discussed in detail.  The pros and cons of robotic assisted, 50% of the time was involved with education, counseling and correlating care.

## 2017-11-27 DIAGNOSIS — R80.9 MICROALBUMINURIA DUE TO TYPE 2 DIABETES MELLITUS (HCC): ICD-10-CM

## 2017-11-27 DIAGNOSIS — E11.29 MICROALBUMINURIA DUE TO TYPE 2 DIABETES MELLITUS (HCC): ICD-10-CM

## 2017-11-27 RX ORDER — LISINOPRIL 2.5 MG/1
2.5 TABLET ORAL DAILY
Qty: 30 TABLET | Refills: 5 | Status: SHIPPED | OUTPATIENT
Start: 2017-11-27 | End: 2017-12-19

## 2017-11-29 DIAGNOSIS — E11.9 CONTROLLED TYPE 2 DIABETES MELLITUS WITHOUT COMPLICATION, WITHOUT LONG-TERM CURRENT USE OF INSULIN (HCC): ICD-10-CM

## 2017-11-29 RX ORDER — ASPIRIN 81 MG/1
TABLET ORAL
Qty: 30 TABLET | Refills: 11 | Status: ON HOLD | OUTPATIENT
Start: 2017-11-29 | End: 2017-12-13 | Stop reason: HOSPADM

## 2017-12-04 ENCOUNTER — OFFICE VISIT (OUTPATIENT)
Dept: FAMILY MEDICINE CLINIC | Age: 42
End: 2017-12-04
Payer: COMMERCIAL

## 2017-12-04 VITALS
SYSTOLIC BLOOD PRESSURE: 144 MMHG | RESPIRATION RATE: 12 BRPM | HEART RATE: 52 BPM | HEIGHT: 74 IN | WEIGHT: 281.8 LBS | BODY MASS INDEX: 36.16 KG/M2 | DIASTOLIC BLOOD PRESSURE: 100 MMHG | TEMPERATURE: 98.1 F

## 2017-12-04 DIAGNOSIS — F33.41 RECURRENT MAJOR DEPRESSIVE DISORDER, IN PARTIAL REMISSION (HCC): ICD-10-CM

## 2017-12-04 DIAGNOSIS — E66.9 OBESITY, CLASS II, BMI 35-39.9: ICD-10-CM

## 2017-12-04 DIAGNOSIS — I10 ESSENTIAL HYPERTENSION: ICD-10-CM

## 2017-12-04 LAB — HBA1C MFR BLD: 7.9 %

## 2017-12-04 PROCEDURE — 1036F TOBACCO NON-USER: CPT | Performed by: FAMILY MEDICINE

## 2017-12-04 PROCEDURE — G8427 DOCREV CUR MEDS BY ELIG CLIN: HCPCS | Performed by: FAMILY MEDICINE

## 2017-12-04 PROCEDURE — 83036 HEMOGLOBIN GLYCOSYLATED A1C: CPT | Performed by: FAMILY MEDICINE

## 2017-12-04 PROCEDURE — G8417 CALC BMI ABV UP PARAM F/U: HCPCS | Performed by: FAMILY MEDICINE

## 2017-12-04 PROCEDURE — 99214 OFFICE O/P EST MOD 30 MIN: CPT | Performed by: FAMILY MEDICINE

## 2017-12-04 PROCEDURE — G8484 FLU IMMUNIZE NO ADMIN: HCPCS | Performed by: FAMILY MEDICINE

## 2017-12-04 PROCEDURE — 3045F PR MOST RECENT HEMOGLOBIN A1C LEVEL 7.0-9.0%: CPT | Performed by: FAMILY MEDICINE

## 2017-12-04 NOTE — PROGRESS NOTES
Chronic Disease Visit Information    BP Readings from Last 3 Encounters:   11/20/17 126/80   10/13/17 126/70   09/18/17 (!) 144/90          Hemoglobin A1C (%)   Date Value   10/30/2017 8.2 (H)   08/25/2017 9.0   05/23/2017 9.0     MICROALBUMIN, RANDOM URINE (mg/dl)   Date Value   01/08/2015 2.71     LDL Calculated (mg/dl)   Date Value   05/01/2017 150     HDL (mg/dl)   Date Value   05/01/2017 40     BUN (mg/dL)   Date Value   10/30/2017 10     CREATININE (mg/dL)   Date Value   10/30/2017 0.9     Glucose   Date Value   10/30/2017 95 mg/dL   07/15/2011 132 mg/dl (H)            Have you changed or started any medications since your last visit including any over-the-counter medicines, vitamins, or herbal medicines? no   Are you having any side effects from any of your medications? -  no  Have you stopped taking any of your medications? Is so, why? -  no    Have you seen any other physician or provider since your last visit? Yes - Records Obtained  Have you had any other diagnostic tests since your last visit? Yes - Records Obtained  Have you been seen in the emergency room and/or had an admission to a hospital since we last saw you? Yes - Records Obtained  Have you had your annual diabetic retinal (eye) exam? No  Have you had your routine dental cleaning in the past 6 months? yes - routine     Have you activated your [employer] account? If not, what are your barriers?  Yes     Patient Care Team:  Samara Richmond DO as PCP - General (Family Medicine)         Medical History Review  Past Medical, Family, and Social History reviewed and does contribute to the patient presenting condition    Health Maintenance   Topic Date Due    Diabetic foot exam  09/06/1987    Diabetic retinal exam  09/06/1987    HIV screen  09/06/1992    DTaP/Tdap/Td vaccine (1 - Tdap) 02/06/2014    Flu vaccine (1) 09/01/2017    Lipid screen  05/01/2018    Diabetic hemoglobin A1C test  10/30/2018    Pneumococcal med risk  Completed

## 2017-12-04 NOTE — PROGRESS NOTES
Vince Davalos Parent is a 38 y.o. male that presents for Follow-up (3 month f/u, pt states that he is doing well, no new concerns )        HPI:    Has been following with weight management center since last visit. He is being scheduled for sleeve gastrectomy. Scheduled 12/11/17. Diabetes Type 2    Glucose control:   Does patient check blood glucoses at home? Yes - states that BGs have been doing really well recently. FBG generally around 120-140. Report of hypoglycemia: no  Lab Results   Component Value Date    LABA1C 7.9 12/04/2017     No results found for: EAG    Symptoms  Polyuria, Polydipsia or Polyphagia? No  Chest Pain, SOB, or Palpitations? -  No  New Vision complaints? No  Paresthesias of the extremities? No    Medications  Current medication were reviewed. Compliant with medications? yes  Medication side effects? No  On ACE-I or ARB? Yes  On antiplatelet therapy? Yes  On Statin? No    Exercise  Exercise? Yes  Wt Readings from Last 3 Encounters:   12/04/17 281 lb 12.8 oz (127.8 kg)   11/20/17 284 lb 9.6 oz (129.1 kg)   10/13/17 279 lb 9.6 oz (126.8 kg)       Diet discipline?:  Low salt, fat, sugar diet? yes    Blood pressure control:  BP Readings from Last 3 Encounters:   12/04/17 (!) 144/100   11/20/17 126/80   10/13/17 126/70       Lab Results   Component Value Date    LABMICR 2.71 01/08/2015       Lab Results   Component Value Date    LDLCALC 150 05/01/2017     Mood is 'good'. Denies anhedonia. Sleep is 'good. States that he is anxious about the upcoming surgery. Tolerating Prozac well.          Health Maintenance   Topic Date Due    Diabetic foot exam  09/06/1987    Diabetic retinal exam  09/06/1987    HIV screen  09/06/1992    DTaP/Tdap/Td vaccine (1 - Tdap) 02/06/2014    Flu vaccine (1) 09/01/2017    Lipid screen  05/01/2018    Diabetic hemoglobin A1C test  10/30/2018    Pneumococcal med risk  Completed       Past Medical History:   Diagnosis Date    Chronic back pain     [facility]     Hyperlipidemia     Hypertension        Past Surgical History:   Procedure Laterality Date    LYMPHADENECTOMY      1978       Social History   Substance Use Topics    Smoking status: Former Smoker     Quit date: 2011    Smokeless tobacco: Former User     Types: Chew    Alcohol use No       Family History   Problem Relation Age of Onset    Diabetes Mother     Thyroid Disease Mother     Obesity Mother     Obesity Father     Diabetes Sister     Obesity Sister     Diabetes Maternal Grandmother     Obesity Maternal Grandmother     Cancer Paternal Grandmother     Heart Disease Paternal Grandfather          I have reviewed the patient's past medical history, past surgical history, allergies, medications, social and family history and I have made updates where appropriate.     ROS        PHYSICAL EXAM:  BP (!) 144/100   Pulse 52   Temp 98.1 °F (36.7 °C) (Oral)   Resp 12   Ht 6' 1.5\" (1.867 m)   Wt 281 lb 12.8 oz (127.8 kg)   BMI 36.67 kg/m²     General Appearance: well developed and well- nourished, in no acute distress  Head: normocephalic and atraumatic  ENT: external ear and ear canal normal bilaterally, nose without deformity, nasal mucosa and turbinates normal without polyps, oropharynx normal, dentition is normal for age, no lip or gum lesions noted  Neck: supple and non-tender without mass, no thyromegaly or thyroid nodules, no cervical lymphadenopathy  Pulmonary/Chest: clear to auscultation bilaterally- no wheezes, rales or rhonchi, normal air movement, no respiratory distress or retractions  Cardiovascular: normal rate, regular rhythm, normal S1 and S2, no murmurs, rubs, clicks, or gallops, distal pulses intact  Abdomen: soft, non-tender, non-distended, no rebound or guarding, no masses or hernias noted, no hepatospleenomegaly  Extremities: no cyanosis, clubbing or edema of the lower extremities  Psych:  Normal affect without evidence of depression or anxiety, insight

## 2017-12-06 ENCOUNTER — TELEPHONE (OUTPATIENT)
Dept: BARIATRICS/WEIGHT MGMT | Age: 42
End: 2017-12-06

## 2017-12-06 NOTE — TELEPHONE ENCOUNTER
----- Message from Rosita Spencer RN sent at 12/6/2017  3:11 PM EST -----  Catarina Wolfe- will you call Ankur Kilgore Parent with surgery time-3:30 arrive at 2:00 pm on 12-    Thank you,  Kwesi Concepcion

## 2017-12-08 NOTE — PROGRESS NOTES
Bring insurance info and drivers license  Wear comfortable clean clothing  Do not bring jewelry   Shower night before and  morning of surgery using [employer] kit provided  Bring medications in original bottles  Bring Binder  Instructed to bring IS  Bring comfortable shoes  Follow all instructions give by your physician

## 2017-12-08 NOTE — H&P
delayed release capsule Take 1 capsule by mouth daily Open capsule and take in liquid 30 capsule 2    Multiple Vitamin (MULTI VITAMIN) TABS Take 1 tablet by mouth daily 30 tablet 5    Cholecalciferol (VITAMIN D3) 1000 units CAPS Take 1 tablet by mouth daily 30 capsule 5    Lancets 30G MISC 1 each by Does not apply route daily 100 each 5    Alcohol Swabs (ALCOHOL WIPES) 70 % PADS Use q daily with glucometer 100 each 5    SITagliptin (JANUVIA) 100 MG tablet Take 1 tablet by mouth daily 30 tablet 5    metFORMIN (GLUCOPHAGE) 1000 MG tablet take 1 tablet by mouth twice a day with meals 60 tablet 5    lisinopril (ZESTRIL) 2.5 MG tablet Take 1 tablet by mouth daily 30 tablet 5    glipiZIDE (GLUCOTROL) 10 MG tablet Take 1 tablet by mouth 2 times daily (before meals) 60 tablet 5    FLUoxetine (PROZAC) 20 MG capsule Take 1 capsule by mouth daily 30 capsule 5    aspirin (RA ASPIRIN EC) 81 MG EC tablet take 1 tablet by mouth once daily 30 tablet 11    Glucose Blood (BLOOD GLUCOSE TEST STRIPS) STRP True Metrix Test strips. Check blood sugar once daily and PRN. 100 strip 2    Blood Glucose Monitoring Suppl BRYAN True Metrix meter.   Check blood sugar once daily and PRN 1 Device 0    [START ON 12/11/2017] metoclopramide (REGLAN) 10 MG tablet Take 1 tablet by mouth every 6 hours as needed (nausea/vomiting) 30 tablet 0    [START ON 12/11/2017] enoxaparin (LOVENOX) 40 MG/0.4ML injection Inject 0.4 mLs into the skin daily for 14 days 14 Syringe 0    [START ON 12/11/2017] Docusate Sodium 100 MG TABS Take 100 mg by mouth 2 times daily Take as needed to prevent constipation 30 tablet 1    [START ON 12/11/2017] ondansetron (ZOFRAN) 4 MG tablet Take 1 tablet by mouth every 4 hours as needed for Nausea or Vomiting 30 tablet 0      No current facility-administered medications for this visit.             No Known Allergies     Family History         Family History   Problem Relation Age of Onset    Diabetes Mother     [facility] Thyroid Disease Mother      Obesity Mother      Obesity Father      Diabetes Sister      Obesity Sister      Diabetes Maternal Grandmother      Obesity Maternal Grandmother      Cancer Paternal Grandmother      Heart Disease Paternal Grandfather              Social History   Social History            Social History    Marital status:        Spouse name: N/A    Number of children: N/A    Years of education: N/A          Occupational History    Not on file.            Social History Main Topics    Smoking status: Former Smoker       Quit date: 2011    Smokeless tobacco: Former User       Types: Chew    Alcohol use No    Drug use: No    Sexual activity: Not on file           Other Topics Concern    Not on file          Social History Narrative    No narrative on file         /80 (Site: Right Arm, Position: Sitting, Cuff Size: Large Adult)   Pulse 60   Temp 97.8 °F (36.6 °C) (Oral)   Resp 18   Ht 6' 1.5\" (1.867 m)   Wt 284 lb 9.6 oz (129.1 kg)   BMI 37.04 kg/m²      Body mass index is 37.04 kg/m².     Objective:   Physical Exam   Constitutional: He is oriented to person, place, and time. He appears well-developed and well-nourished. No distress. HENT:   Head: Normocephalic and atraumatic. Right Ear: External ear normal.   Left Ear: External ear normal.   Nose: Nose normal.   Mouth/Throat: Oropharynx is clear and moist and mucous membranes are normal. No oropharyngeal exudate. Eyes: Conjunctivae and EOM are normal. Pupils are equal, round, and reactive to light. Right eye exhibits no discharge. Left eye exhibits no discharge. No scleral icterus. Neck: Trachea normal, normal range of motion, full passive range of motion without pain and phonation normal. Neck supple. No JVD present. Cardiovascular: Normal rate, regular rhythm, S1 normal, S2 normal and normal heart sounds. Exam reveals no gallop and no friction rub. No murmur heard.   Pulmonary/Chest: Effort normal and 07/15/2011     BILITOT 0.6 05/01/2017     ALT 65 05/01/2017         PREALBUMIN         Lab Results   Component Value Date     PREALBUMIN 31.5 05/01/2017         VITAMIN B12         Lab Results   Component Value Date     JHGFMXYR69 746 05/01/2017         24 HOUR URINE CALCIUM   No results found for: Priscilla Thomas CALCIUMUR      VITAMIN D         Lab Results   Component Value Date     VITD25 26 09/18/2017         VITAMIN B1/ THIAMINE         Lab Results   Component Value Date     [id number] 78 05/01/2017         RBC FOLATE         Lab Results   Component Value Date     FOLATE 13.0 05/01/2017         LIPID SCREEN (FASTING)         Lab Results   Component Value Date     CHOL 228 05/01/2017     TRIG 192 05/01/2017     HDL 40 05/01/2017     LDLCALC 150 05/01/2017   ,      HGA1C (T2DM ONLY)         Lab Results   Component Value Date     LABA1C 8.2 10/30/2017     AVGG 186 10/30/2017         TSH         Lab Results   Component Value Date     TSH 1.600 05/01/2017         IRON   Lab Results   Component Value Date     IRON 90 05/01/2017         IONIZED CALCIUM   No results found for: Iker Mata      Imaging -   Narrative   PROCEDURE: XR CHEST STANDARD TWO VW       CLINICAL INFORMATION: Severe obesity (BMI 35.0-39. 9) with comorbidity [facility])       COMPARISON: 3/4/2014       TECHNIQUE: PA and lateral views of the chest were obtained.               Impression   Normal chest. No acute findings.          EGD: in process           Patient Active Problem List   Diagnosis    Uncontrolled type 2 diabetes mellitus, without long-term current use of insulin ([address]    Microalbuminuria due to type 2 diabetes mellitus ([address]    Recurrent major depressive disorder, in partial remission ([address]    Vitamin D deficiency    Depression    Essential hypertension    Uncontrolled type 2 diabetes mellitus without complication, without long-term current use of insulin ([facility])         Assessment:   1.  Obesity (BMI 37)  2.  Diabetes mellitus  3.  Hypercholesterolemia  4.  Hypertension  5.  Lower back pain  6.  Vitamin D deficiency  7.  Depression     Plan:   1. Discussion again about the pros and cons of weight loss surgery.  The risks benefits and alternatives to laparoscopic adjustable band, gastric sleeve and gastric Dennys-en-Y bypass were discussed in detail.  The pros and cons of robotic assisted, laparoscopic and open techniques were discussed. 2.  Behavior modification discussed in detail in regards to dietary habits. 3.  Nutritional education occurred during visit.  Follow-up with dietitian for further evaluation.  and continue to follow their recommendations as directed  4.  Options for medical management of morbid obesity discussed. 5.  Improvement in fitness/exercise discussed with patient and the need for this with/without surgery. 6.  Medical necessity letter from PCP. 7.  Follow-up in one week after surgery in the weight management program at [address] [place] [address]. 8.  Signs and symptoms reviewed with patient that would be concerning and need him to return to office for re-evaluation. Patient states he will call if he has questions or concerns.   9. Multivitamin  10. Psychology evaluation completed.  Follow-up as needed.    11. EGD Completed. Results reviewed. 12.  Encouraged support groups  13.  Discussed again the pros and cons with possible side effects in the weight loss medication  14. Scheduled Billey Sheets for robotic sleeve gastrectomy. 15. He has undergone pre-operative clearance per anesthesia guidelines with risk factors listed under the past medical history diagnosis & problem list.  16. The risks, benefits and alternatives were discussed with Billey Sheets including non-operative management. The pros and cons of robotic, laparoscopic and open techniques were discussed in detail. All questions answered. He understands and wishes to proceed with surgical intervention.   17. Restrictions discussed with Billey Sheets and he expresses understanding.   25. He is advised to call back directly if there are further questions/concerns, or if his symptoms worsen prior to surgery.     - Lourdes Romero states that he has not been able to lose enough adequate excess body weight with medical management only and would like to proceed with a sleeve gastrectomy

## 2017-12-11 ENCOUNTER — ANESTHESIA (OUTPATIENT)
Dept: OPERATING ROOM | Age: 42
DRG: 403 | End: 2017-12-11
Payer: COMMERCIAL

## 2017-12-11 ENCOUNTER — HOSPITAL ENCOUNTER (INPATIENT)
Age: 42
LOS: 2 days | Discharge: HOME | DRG: 403 | End: 2017-12-13
Attending: SURGERY | Admitting: SURGERY
Payer: COMMERCIAL

## 2017-12-11 ENCOUNTER — ANESTHESIA EVENT (OUTPATIENT)
Dept: OPERATING ROOM | Age: 42
DRG: 403 | End: 2017-12-11
Payer: COMMERCIAL

## 2017-12-11 VITALS — DIASTOLIC BLOOD PRESSURE: 113 MMHG | TEMPERATURE: 98.6 F | OXYGEN SATURATION: 97 % | SYSTOLIC BLOOD PRESSURE: 201 MMHG

## 2017-12-11 PROBLEM — E66.9 OBESITY: Status: ACTIVE | Noted: 2017-12-11

## 2017-12-11 PROCEDURE — 6360000002 HC RX W HCPCS: Performed by: SURGERY

## 2017-12-11 PROCEDURE — S0028 INJECTION, FAMOTIDINE, 20 MG: HCPCS | Performed by: SURGERY

## 2017-12-11 PROCEDURE — 2500000003 HC RX 250 WO HCPCS: Performed by: NURSE ANESTHETIST, CERTIFIED REGISTERED

## 2017-12-11 PROCEDURE — 6360000002 HC RX W HCPCS: Performed by: ANESTHESIOLOGY

## 2017-12-11 PROCEDURE — 2580000003 HC RX 258: Performed by: SURGERY

## 2017-12-11 PROCEDURE — C9250 ARTISS FIBRIN SEALANT: HCPCS | Performed by: SURGERY

## 2017-12-11 PROCEDURE — 7100000000 HC PACU RECOVERY - FIRST 15 MIN: Performed by: SURGERY

## 2017-12-11 PROCEDURE — 6360000002 HC RX W HCPCS: Performed by: NURSE ANESTHETIST, CERTIFIED REGISTERED

## 2017-12-11 PROCEDURE — 3600000009 HC SURGERY ROBOT BASE: Performed by: SURGERY

## 2017-12-11 PROCEDURE — S2900 ROBOTIC SURGICAL SYSTEM: HCPCS | Performed by: SURGERY

## 2017-12-11 PROCEDURE — 3700000000 HC ANESTHESIA ATTENDED CARE: Performed by: SURGERY

## 2017-12-11 PROCEDURE — A4450 NON-WATERPROOF TAPE: HCPCS | Performed by: SURGERY

## 2017-12-11 PROCEDURE — 2500000003 HC RX 250 WO HCPCS: Performed by: SURGERY

## 2017-12-11 PROCEDURE — 2500000003 HC RX 250 WO HCPCS: Performed by: ANESTHESIOLOGY

## 2017-12-11 PROCEDURE — 88300 SURGICAL PATH GROSS: CPT

## 2017-12-11 PROCEDURE — 8E0W7CZ ROBOTIC ASSISTED PROCEDURE OF TRUNK REGION, VIA NATURAL OR ARTIFICIAL OPENING: ICD-10-PCS | Performed by: SURGERY

## 2017-12-11 PROCEDURE — 3700000001 HC ADD 15 MINUTES (ANESTHESIA): Performed by: SURGERY

## 2017-12-11 PROCEDURE — 43775 LAP SLEEVE GASTRECTOMY: CPT | Performed by: SURGERY

## 2017-12-11 PROCEDURE — 2780000010 HC IMPLANT OTHER: Performed by: SURGERY

## 2017-12-11 PROCEDURE — 0DB64Z3 EXCISION OF STOMACH, PERCUTANEOUS ENDOSCOPIC APPROACH, VERTICAL: ICD-10-PCS | Performed by: SURGERY

## 2017-12-11 PROCEDURE — 7100000001 HC PACU RECOVERY - ADDTL 15 MIN: Performed by: SURGERY

## 2017-12-11 PROCEDURE — 1200000000 HC SEMI PRIVATE

## 2017-12-11 PROCEDURE — A6413 ADHESIVE BANDAGE, FIRST-AID: HCPCS | Performed by: SURGERY

## 2017-12-11 PROCEDURE — 6370000000 HC RX 637 (ALT 250 FOR IP): Performed by: SURGERY

## 2017-12-11 PROCEDURE — 2720000010 HC SURG SUPPLY STERILE: Performed by: SURGERY

## 2017-12-11 PROCEDURE — 2580000003 HC RX 258: Performed by: NURSE ANESTHETIST, CERTIFIED REGISTERED

## 2017-12-11 PROCEDURE — 3600000019 HC SURGERY ROBOT ADDTL 15MIN: Performed by: SURGERY

## 2017-12-11 DEVICE — REINFORCED RELOAD WITH TRI-STAPLE TECHNOLOGY
Type: IMPLANTABLE DEVICE | Status: FUNCTIONAL
Brand: ENDO GIA

## 2017-12-11 DEVICE — RELOAD STPLR REINF 60 MM X THCK W/ TRISTAPLE TECHNOLOGY BLK: Type: IMPLANTABLE DEVICE | Status: FUNCTIONAL

## 2017-12-11 RX ORDER — ROCURONIUM BROMIDE 10 MG/ML
INJECTION, SOLUTION INTRAVENOUS PRN
Status: DISCONTINUED | OUTPATIENT
Start: 2017-12-11 | End: 2017-12-11 | Stop reason: SDUPTHER

## 2017-12-11 RX ORDER — ACETAMINOPHEN 325 MG/1
650 TABLET ORAL EVERY 4 HOURS PRN
Status: DISCONTINUED | OUTPATIENT
Start: 2017-12-11 | End: 2017-12-13 | Stop reason: HOSPADM

## 2017-12-11 RX ORDER — NEOSTIGMINE METHYLSULFATE 1 MG/ML
INJECTION, SOLUTION INTRAVENOUS PRN
Status: DISCONTINUED | OUTPATIENT
Start: 2017-12-11 | End: 2017-12-11 | Stop reason: SDUPTHER

## 2017-12-11 RX ORDER — SODIUM CHLORIDE 0.9 % (FLUSH) 0.9 %
10 SYRINGE (ML) INJECTION EVERY 12 HOURS SCHEDULED
Status: DISCONTINUED | OUTPATIENT
Start: 2017-12-11 | End: 2017-12-11 | Stop reason: HOSPADM

## 2017-12-11 RX ORDER — LABETALOL HYDROCHLORIDE 5 MG/ML
INJECTION, SOLUTION INTRAVENOUS PRN
Status: DISCONTINUED | OUTPATIENT
Start: 2017-12-11 | End: 2017-12-11 | Stop reason: SDUPTHER

## 2017-12-11 RX ORDER — PANTOPRAZOLE SODIUM 40 MG/10ML
40 INJECTION, POWDER, LYOPHILIZED, FOR SOLUTION INTRAVENOUS DAILY
Status: DISCONTINUED | OUTPATIENT
Start: 2017-12-11 | End: 2017-12-11 | Stop reason: ALTCHOICE

## 2017-12-11 RX ORDER — SCOLOPAMINE TRANSDERMAL SYSTEM 1 MG/1
1 PATCH, EXTENDED RELEASE TRANSDERMAL
Status: DISCONTINUED | OUTPATIENT
Start: 2017-12-11 | End: 2017-12-11

## 2017-12-11 RX ORDER — MORPHINE SULFATE 2 MG/ML
2 INJECTION, SOLUTION INTRAMUSCULAR; INTRAVENOUS
Status: DISCONTINUED | OUTPATIENT
Start: 2017-12-11 | End: 2017-12-13 | Stop reason: HOSPADM

## 2017-12-11 RX ORDER — MORPHINE SULFATE 2 MG/ML
4 INJECTION, SOLUTION INTRAMUSCULAR; INTRAVENOUS
Status: DISCONTINUED | OUTPATIENT
Start: 2017-12-11 | End: 2017-12-13 | Stop reason: HOSPADM

## 2017-12-11 RX ORDER — SODIUM CHLORIDE 0.9 % (FLUSH) 0.9 %
10 SYRINGE (ML) INJECTION EVERY 12 HOURS SCHEDULED
Status: DISCONTINUED | OUTPATIENT
Start: 2017-12-11 | End: 2017-12-13 | Stop reason: HOSPADM

## 2017-12-11 RX ORDER — LABETALOL HYDROCHLORIDE 5 MG/ML
10 INJECTION, SOLUTION INTRAVENOUS EVERY 10 MIN PRN
Status: DISCONTINUED | OUTPATIENT
Start: 2017-12-11 | End: 2017-12-11 | Stop reason: HOSPADM

## 2017-12-11 RX ORDER — METOCLOPRAMIDE HYDROCHLORIDE 5 MG/ML
10 INJECTION INTRAMUSCULAR; INTRAVENOUS EVERY 6 HOURS PRN
Status: DISCONTINUED | OUTPATIENT
Start: 2017-12-11 | End: 2017-12-12

## 2017-12-11 RX ORDER — BUPIVACAINE HYDROCHLORIDE AND EPINEPHRINE 5; 5 MG/ML; UG/ML
INJECTION, SOLUTION EPIDURAL; INTRACAUDAL; PERINEURAL PRN
Status: DISCONTINUED | OUTPATIENT
Start: 2017-12-11 | End: 2017-12-11 | Stop reason: HOSPADM

## 2017-12-11 RX ORDER — SODIUM CHLORIDE, SODIUM LACTATE, POTASSIUM CHLORIDE, CALCIUM CHLORIDE 600; 310; 30; 20 MG/100ML; MG/100ML; MG/100ML; MG/100ML
INJECTION, SOLUTION INTRAVENOUS CONTINUOUS PRN
Status: DISCONTINUED | OUTPATIENT
Start: 2017-12-11 | End: 2017-12-11 | Stop reason: SDUPTHER

## 2017-12-11 RX ORDER — ONDANSETRON 2 MG/ML
4 INJECTION INTRAMUSCULAR; INTRAVENOUS ONCE
Status: COMPLETED | OUTPATIENT
Start: 2017-12-11 | End: 2017-12-11

## 2017-12-11 RX ORDER — LIDOCAINE HYDROCHLORIDE 20 MG/ML
INJECTION, SOLUTION EPIDURAL; INFILTRATION; INTRACAUDAL; PERINEURAL PRN
Status: DISCONTINUED | OUTPATIENT
Start: 2017-12-11 | End: 2017-12-11 | Stop reason: SDUPTHER

## 2017-12-11 RX ORDER — KETOROLAC TROMETHAMINE 30 MG/ML
30 INJECTION, SOLUTION INTRAMUSCULAR; INTRAVENOUS EVERY 8 HOURS
Status: DISCONTINUED | OUTPATIENT
Start: 2017-12-11 | End: 2017-12-13 | Stop reason: HOSPADM

## 2017-12-11 RX ORDER — PROPOFOL 10 MG/ML
INJECTION, EMULSION INTRAVENOUS PRN
Status: DISCONTINUED | OUTPATIENT
Start: 2017-12-11 | End: 2017-12-11 | Stop reason: SDUPTHER

## 2017-12-11 RX ORDER — SODIUM CHLORIDE 9 MG/ML
INJECTION, SOLUTION INTRAVENOUS CONTINUOUS
Status: DISCONTINUED | OUTPATIENT
Start: 2017-12-11 | End: 2017-12-13 | Stop reason: HOSPADM

## 2017-12-11 RX ORDER — METOCLOPRAMIDE HYDROCHLORIDE 5 MG/ML
INJECTION INTRAMUSCULAR; INTRAVENOUS
Status: DISPENSED
Start: 2017-12-11 | End: 2017-12-12

## 2017-12-11 RX ORDER — LISINOPRIL 2.5 MG/1
2.5 TABLET ORAL DAILY
Status: DISCONTINUED | OUTPATIENT
Start: 2017-12-11 | End: 2017-12-13 | Stop reason: HOSPADM

## 2017-12-11 RX ORDER — FENTANYL CITRATE 50 UG/ML
INJECTION, SOLUTION INTRAMUSCULAR; INTRAVENOUS PRN
Status: DISCONTINUED | OUTPATIENT
Start: 2017-12-11 | End: 2017-12-11 | Stop reason: SDUPTHER

## 2017-12-11 RX ORDER — SODIUM CHLORIDE 9 MG/ML
INJECTION, SOLUTION INTRAVENOUS CONTINUOUS PRN
Status: DISCONTINUED | OUTPATIENT
Start: 2017-12-11 | End: 2017-12-11 | Stop reason: SDUPTHER

## 2017-12-11 RX ORDER — SODIUM CHLORIDE 9 MG/ML
INJECTION, SOLUTION INTRAVENOUS CONTINUOUS
Status: DISCONTINUED | OUTPATIENT
Start: 2017-12-11 | End: 2017-12-11 | Stop reason: SDUPTHER

## 2017-12-11 RX ORDER — HYOSCYAMINE SULFATE 0.125 MG
125 TABLET,DISINTEGRATING ORAL EVERY 4 HOURS PRN
Status: DISCONTINUED | OUTPATIENT
Start: 2017-12-11 | End: 2017-12-12

## 2017-12-11 RX ORDER — GLYCOPYRROLATE 0.2 MG/ML
INJECTION INTRAMUSCULAR; INTRAVENOUS PRN
Status: DISCONTINUED | OUTPATIENT
Start: 2017-12-11 | End: 2017-12-11 | Stop reason: SDUPTHER

## 2017-12-11 RX ORDER — SODIUM CHLORIDE 0.9 % (FLUSH) 0.9 %
10 SYRINGE (ML) INJECTION PRN
Status: DISCONTINUED | OUTPATIENT
Start: 2017-12-11 | End: 2017-12-11 | Stop reason: HOSPADM

## 2017-12-11 RX ORDER — SCOLOPAMINE TRANSDERMAL SYSTEM 1 MG/1
1 PATCH, EXTENDED RELEASE TRANSDERMAL
Status: DISCONTINUED | OUTPATIENT
Start: 2017-12-11 | End: 2017-12-13 | Stop reason: HOSPADM

## 2017-12-11 RX ORDER — HYDROMORPHONE HCL 110MG/55ML
PATIENT CONTROLLED ANALGESIA SYRINGE INTRAVENOUS PRN
Status: DISCONTINUED | OUTPATIENT
Start: 2017-12-11 | End: 2017-12-11 | Stop reason: SDUPTHER

## 2017-12-11 RX ORDER — SODIUM CHLORIDE 0.9 % (FLUSH) 0.9 %
10 SYRINGE (ML) INJECTION PRN
Status: DISCONTINUED | OUTPATIENT
Start: 2017-12-11 | End: 2017-12-13 | Stop reason: HOSPADM

## 2017-12-11 RX ORDER — DEXAMETHASONE SODIUM PHOSPHATE 4 MG/ML
INJECTION, SOLUTION INTRA-ARTICULAR; INTRALESIONAL; INTRAMUSCULAR; INTRAVENOUS; SOFT TISSUE PRN
Status: DISCONTINUED | OUTPATIENT
Start: 2017-12-11 | End: 2017-12-11 | Stop reason: SDUPTHER

## 2017-12-11 RX ORDER — SUCCINYLCHOLINE CHLORIDE 20 MG/ML
INJECTION INTRAMUSCULAR; INTRAVENOUS PRN
Status: DISCONTINUED | OUTPATIENT
Start: 2017-12-11 | End: 2017-12-11 | Stop reason: SDUPTHER

## 2017-12-11 RX ORDER — 0.9 % SODIUM CHLORIDE 0.9 %
10 VIAL (ML) INJECTION DAILY
Status: DISCONTINUED | OUTPATIENT
Start: 2017-12-11 | End: 2017-12-11 | Stop reason: ALTCHOICE

## 2017-12-11 RX ORDER — ONDANSETRON 2 MG/ML
INJECTION INTRAMUSCULAR; INTRAVENOUS PRN
Status: DISCONTINUED | OUTPATIENT
Start: 2017-12-11 | End: 2017-12-11 | Stop reason: SDUPTHER

## 2017-12-11 RX ORDER — PROMETHAZINE HYDROCHLORIDE 25 MG/1
25 SUPPOSITORY RECTAL EVERY 6 HOURS PRN
Status: DISCONTINUED | OUTPATIENT
Start: 2017-12-11 | End: 2017-12-13 | Stop reason: HOSPADM

## 2017-12-11 RX ORDER — ONDANSETRON 2 MG/ML
4 INJECTION INTRAMUSCULAR; INTRAVENOUS EVERY 6 HOURS
Status: DISCONTINUED | OUTPATIENT
Start: 2017-12-11 | End: 2017-12-13 | Stop reason: HOSPADM

## 2017-12-11 RX ORDER — PROMETHAZINE HYDROCHLORIDE 25 MG/ML
12.5 INJECTION, SOLUTION INTRAMUSCULAR; INTRAVENOUS
Status: COMPLETED | OUTPATIENT
Start: 2017-12-11 | End: 2017-12-11

## 2017-12-11 RX ADMIN — DEXAMETHASONE SODIUM PHOSPHATE 10 MG: 4 INJECTION, SOLUTION INTRAMUSCULAR; INTRAVENOUS at 16:46

## 2017-12-11 RX ADMIN — LIDOCAINE HYDROCHLORIDE 100 MG: 20 INJECTION, SOLUTION EPIDURAL; INFILTRATION; INTRACAUDAL; PERINEURAL at 16:41

## 2017-12-11 RX ADMIN — FENTANYL CITRATE 50 MCG: 50 INJECTION INTRAMUSCULAR; INTRAVENOUS at 16:50

## 2017-12-11 RX ADMIN — MORPHINE SULFATE 4 MG: 2 INJECTION, SOLUTION INTRAMUSCULAR; INTRAVENOUS at 20:32

## 2017-12-11 RX ADMIN — HYDROMORPHONE HYDROCHLORIDE 0.5 MG: 2 INJECTION INTRAMUSCULAR; INTRAVENOUS; SUBCUTANEOUS at 17:47

## 2017-12-11 RX ADMIN — LABETALOL HYDROCHLORIDE 10 MG: 5 INJECTION INTRAVENOUS at 18:09

## 2017-12-11 RX ADMIN — Medication 5 MG: at 16:42

## 2017-12-11 RX ADMIN — Medication 10 ML: at 22:33

## 2017-12-11 RX ADMIN — PROPOFOL 10 MG: 10 INJECTION, EMULSION INTRAVENOUS at 17:41

## 2017-12-11 RX ADMIN — LISINOPRIL 2.5 MG: 2.5 TABLET ORAL at 22:19

## 2017-12-11 RX ADMIN — HYDROMORPHONE HYDROCHLORIDE 0.5 MG: 2 INJECTION INTRAMUSCULAR; INTRAVENOUS; SUBCUTANEOUS at 17:51

## 2017-12-11 RX ADMIN — ONDANSETRON 4 MG: 2 INJECTION INTRAMUSCULAR; INTRAVENOUS at 22:19

## 2017-12-11 RX ADMIN — WATER 2 G: 1 INJECTION INTRAMUSCULAR; INTRAVENOUS; SUBCUTANEOUS at 16:51

## 2017-12-11 RX ADMIN — ONDANSETRON 4 MG: 2 INJECTION INTRAMUSCULAR; INTRAVENOUS at 17:32

## 2017-12-11 RX ADMIN — HYDROMORPHONE HYDROCHLORIDE 0.5 MG: 1 INJECTION, SOLUTION INTRAMUSCULAR; INTRAVENOUS; SUBCUTANEOUS at 19:07

## 2017-12-11 RX ADMIN — PROMETHAZINE HYDROCHLORIDE 12.5 MG: 25 INJECTION INTRAMUSCULAR; INTRAVENOUS at 17:59

## 2017-12-11 RX ADMIN — FAMOTIDINE 20 MG: 10 INJECTION, SOLUTION INTRAVENOUS at 22:27

## 2017-12-11 RX ADMIN — MORPHINE SULFATE 4 MG: 2 INJECTION, SOLUTION INTRAMUSCULAR; INTRAVENOUS at 23:42

## 2017-12-11 RX ADMIN — Medication 40 MG: at 16:46

## 2017-12-11 RX ADMIN — SODIUM CHLORIDE, POTASSIUM CHLORIDE, SODIUM LACTATE AND CALCIUM CHLORIDE: 600; 310; 30; 20 INJECTION, SOLUTION INTRAVENOUS at 17:15

## 2017-12-11 RX ADMIN — KETOROLAC TROMETHAMINE 30 MG: 30 INJECTION, SOLUTION INTRAMUSCULAR at 19:15

## 2017-12-11 RX ADMIN — SODIUM CHLORIDE: 9 INJECTION, SOLUTION INTRAVENOUS at 14:48

## 2017-12-11 RX ADMIN — SUCCINYLCHOLINE CHLORIDE 140 MG: 20 INJECTION, SOLUTION INTRAMUSCULAR; INTRAVENOUS at 16:42

## 2017-12-11 RX ADMIN — GLYCOPYRROLATE 0.2 MG: 0.2 INJECTION, SOLUTION INTRAMUSCULAR; INTRAVENOUS at 17:02

## 2017-12-11 RX ADMIN — FENTANYL CITRATE 50 MCG: 50 INJECTION INTRAMUSCULAR; INTRAVENOUS at 16:37

## 2017-12-11 RX ADMIN — GLYCOPYRROLATE 0.5 MG: 0.2 INJECTION, SOLUTION INTRAMUSCULAR; INTRAVENOUS at 17:36

## 2017-12-11 RX ADMIN — HYDROMORPHONE HYDROCHLORIDE 0.5 MG: 1 INJECTION, SOLUTION INTRAMUSCULAR; INTRAVENOUS; SUBCUTANEOUS at 18:54

## 2017-12-11 RX ADMIN — SODIUM CHLORIDE: 9 INJECTION, SOLUTION INTRAVENOUS at 16:37

## 2017-12-11 RX ADMIN — LABETALOL HYDROCHLORIDE 10 MG: 5 INJECTION INTRAVENOUS at 18:25

## 2017-12-11 RX ADMIN — ONDANSETRON 4 MG: 2 INJECTION INTRAMUSCULAR; INTRAVENOUS at 15:10

## 2017-12-11 RX ADMIN — HYDROMORPHONE HYDROCHLORIDE 0.5 MG: 1 INJECTION, SOLUTION INTRAMUSCULAR; INTRAVENOUS; SUBCUTANEOUS at 18:05

## 2017-12-11 RX ADMIN — NEOSTIGMINE METHYLSULFATE 2.5 MG: 1 INJECTION, SOLUTION INTRAVENOUS at 17:36

## 2017-12-11 RX ADMIN — PROPOFOL 200 MG: 10 INJECTION, EMULSION INTRAVENOUS at 16:41

## 2017-12-11 RX ADMIN — FAMOTIDINE 20 MG: 10 INJECTION, SOLUTION INTRAVENOUS at 15:09

## 2017-12-11 RX ADMIN — Medication 5 MG: at 17:18

## 2017-12-11 RX ADMIN — LABETALOL HYDROCHLORIDE 5 MG: 5 INJECTION, SOLUTION INTRAVENOUS at 16:50

## 2017-12-11 RX ADMIN — HYDROMORPHONE HYDROCHLORIDE 0.5 MG: 1 INJECTION, SOLUTION INTRAMUSCULAR; INTRAVENOUS; SUBCUTANEOUS at 18:35

## 2017-12-11 RX ADMIN — METOCLOPRAMIDE 10 MG: 5 INJECTION, SOLUTION INTRAMUSCULAR; INTRAVENOUS at 19:36

## 2017-12-11 RX ADMIN — HYDROMORPHONE HYDROCHLORIDE 0.5 MG: 2 INJECTION INTRAMUSCULAR; INTRAVENOUS; SUBCUTANEOUS at 17:22

## 2017-12-11 ASSESSMENT — PAIN SCALES - GENERAL
PAINLEVEL_OUTOF10: 6
PAINLEVEL_OUTOF10: 9
PAINLEVEL_OUTOF10: 10
PAINLEVEL_OUTOF10: 8
PAINLEVEL_OUTOF10: 8
PAINLEVEL_OUTOF10: 10
PAINLEVEL_OUTOF10: 6
PAINLEVEL_OUTOF10: 6
PAINLEVEL_OUTOF10: 10
PAINLEVEL_OUTOF10: 0
PAINLEVEL_OUTOF10: 10
PAINLEVEL_OUTOF10: 9

## 2017-12-11 ASSESSMENT — PULMONARY FUNCTION TESTS
PIF_VALUE: 16
PIF_VALUE: 16
PIF_VALUE: 24
PIF_VALUE: 35
PIF_VALUE: 1
PIF_VALUE: 1
PIF_VALUE: 25
PIF_VALUE: 24
PIF_VALUE: 24
PIF_VALUE: 19
PIF_VALUE: 15
PIF_VALUE: 23
PIF_VALUE: 2
PIF_VALUE: 15
PIF_VALUE: 19
PIF_VALUE: 23
PIF_VALUE: 23
PIF_VALUE: 24
PIF_VALUE: 24
PIF_VALUE: 23
PIF_VALUE: 23
PIF_VALUE: 2
PIF_VALUE: 23
PIF_VALUE: 25
PIF_VALUE: 24
PIF_VALUE: 23
PIF_VALUE: 27
PIF_VALUE: 12
PIF_VALUE: 19
PIF_VALUE: 18
PIF_VALUE: 16
PIF_VALUE: 25
PIF_VALUE: 19
PIF_VALUE: 25
PIF_VALUE: 23
PIF_VALUE: 24
PIF_VALUE: 23
PIF_VALUE: 19
PIF_VALUE: 23
PIF_VALUE: 25
PIF_VALUE: 10
PIF_VALUE: 6
PIF_VALUE: 16
PIF_VALUE: 17
PIF_VALUE: 26
PIF_VALUE: 19
PIF_VALUE: 25
PIF_VALUE: 24
PIF_VALUE: 19
PIF_VALUE: 1
PIF_VALUE: 24
PIF_VALUE: 23
PIF_VALUE: 18
PIF_VALUE: 18
PIF_VALUE: 23
PIF_VALUE: 17
PIF_VALUE: 23
PIF_VALUE: 23
PIF_VALUE: 24
PIF_VALUE: 16
PIF_VALUE: 25
PIF_VALUE: 26
PIF_VALUE: 1
PIF_VALUE: 16

## 2017-12-11 ASSESSMENT — PAIN DESCRIPTION - LOCATION
LOCATION: ABDOMEN
LOCATION: ABDOMEN

## 2017-12-11 ASSESSMENT — PAIN DESCRIPTION - PAIN TYPE
TYPE: SURGICAL PAIN
TYPE: SURGICAL PAIN

## 2017-12-11 ASSESSMENT — PAIN DESCRIPTION - FREQUENCY: FREQUENCY: CONTINUOUS

## 2017-12-11 ASSESSMENT — PAIN DESCRIPTION - DESCRIPTORS
DESCRIPTORS: ACHING;CRAMPING
DESCRIPTORS: ACHING

## 2017-12-11 ASSESSMENT — PAIN DESCRIPTION - ORIENTATION: ORIENTATION: ANTERIOR

## 2017-12-11 NOTE — INTERVAL H&P NOTE
[address] [address]  History and Physical Update    Pt Name: Kailyn Chong Parent  MRN: [id number]  YOB: 1975  Date of evaluation: 12/11/2017    [x] I have examined the patient and reviewed the H&P/Consult and there are no changes to the patient or plans.     [] I have examined the patient and reviewed the H&P/Consult and have noted the following changes:        Soco Suarez  Electronically signed 12/11/2017 at 4:07 PM

## 2017-12-11 NOTE — BRIEF OP NOTE
Brief Postoperative Note    Gerardo Espino Parent  YOB: 1975  [id number]    Pre-operative Diagnosis: 1. Obesity 2. BMI 36 3. Diabetes mellitus 4. Hypertension    Post-operative Diagnosis: Same    Procedure: 1. Robotic sleeve gastrectomy    Anesthesia: General and Local    Surgeons/Assistants:  Marli/Merlin    Estimated Blood Loss: 20 ml    Complications: None    Specimens: Was Obtained: stomach    Findings: as above    Electronically signed by Jeff Schuler MD on 12/11/2017 at 6:09 AM

## 2017-12-11 NOTE — ANESTHESIA PRE PROCEDURE
Department of Anesthesiology  Preprocedure Note       Name:  Kaur Biggs Parent   Age:  38 y.o.  :  1975                                          MRN:  [id number]         Date:  2017      Surgeon: Surinder Haile):  Lonzo Heimlich, MD    Procedure: Procedure(s):  ROBOTIC GASTRECTOMY SLEEVE    Medications prior to admission:   Prior to Admission medications    Medication Sig Start Date End Date Taking?  Authorizing Provider   aspirin (RA ASPIRIN EC) 81 MG EC tablet take 1 tablet by mouth once daily 17  Yes Kaylah Edwards DO   lisinopril (ZESTRIL) 2.5 MG tablet Take 1 tablet by mouth daily 17  Yes Kaylah Edwards DO   metFORMIN (GLUCOPHAGE) 1000 MG tablet take 1 tablet by mouth twice a day with meals 17  Yes Kaylah Edwards DO   metoclopramide (REGLAN) 10 MG tablet Take 1 tablet by mouth every 6 hours as needed (nausea/vomiting) 17 Yes Lonzo Heimlich, MD   enoxaparin (LOVENOX) 40 MG/0.4ML injection Inject 0.4 mLs into the skin daily for 14 days 17 Yes Lonzo Heimlich, MD   Docusate Sodium 100 MG TABS Take 100 mg by mouth 2 times daily Take as needed to prevent constipation 17  Yes Lonzo Heimlich, MD   omeprazole (PRILOSEC) 40 MG delayed release capsule Take 1 capsule by mouth daily Open capsule and take in liquid 12/11/17 3/11/18 Yes Lonzo Heimlich, MD   ondansetron (ZOFRAN) 4 MG tablet Take 1 tablet by mouth every 4 hours as needed for Nausea or Vomiting 17 Yes Lonzo Heimlich, MD   Multiple Vitamin (MULTI VITAMIN) TABS Take 1 tablet by mouth daily 17  Yes Kaylah Edwards DO   Cholecalciferol (VITAMIN D3) 1000 units CAPS Take 1 tablet by mouth daily 17  Yes Kaylah Edwards DO   SITagliptin (JANUVIA) 100 MG tablet Take 1 tablet by mouth daily 17  Yes Cristian Kelley,    glipiZIDE (GLUCOTROL) 10 MG tablet Take 1 tablet by mouth 2 times daily (before meals) 17  Yes Darline Kelley, DO   FLUoxetine (PROZAC) 20 MG capsule Take 1 capsule by mouth daily 11/13/17  Yes Everjosepht Gill, DO       Current medications:    Current Facility-Administered Medications   Medication Dose Route Frequency Provider Last Rate Last Dose    sodium chloride flush 0.9 % injection 10 mL  10 mL Intravenous 2 times per day Ilan Horn MD        sodium chloride flush 0.9 % injection 10 mL  10 mL Intravenous PRN Ilan Horn MD        cefOXitin (MEFOXIN) 2 g in sterile water 20 mL IV syringe  2 g Intravenous On Call to [facility] Johnathon Woodward MD        scopolamine (TRANSDERM-SCOP) transdermal patch 1 patch  1 patch Transdermal Q72H Ilan Horn MD   1 patch at 12/11/17 1508    0.9 % sodium chloride infusion   Intravenous Continuous Ilan Horn  mL/hr at 12/11/17 1448         Allergies:  No Known Allergies    Problem List:    Patient Active Problem List   Diagnosis Code    Uncontrolled type 2 diabetes mellitus, without long-term current use of insulin ([facility]) E11.65    Microalbuminuria due to type 2 diabetes mellitus ([facility] E11.29, R80.9    Recurrent major depressive disorder, in partial remission ([facility] F33.41    Vitamin D deficiency E55.9    Depression F32.9    Essential hypertension I10    Obesity E66.9       Past Medical History:        Diagnosis Date    Chronic back pain     [facility]     Hyperlipidemia     Hypertension        Past Surgical History:        Procedure Laterality Date    ENDOSCOPY, COLON, DIAGNOSTIC      LYMPHADENECTOMY      1978    WISDOM TOOTH EXTRACTION         Social History:    Social History   Substance Use Topics    Smoking status: Former Smoker     Quit date: 2011    Smokeless tobacco: Former User     Types: Chew    Alcohol use No                                Counseling given: Not Answered      Vital Signs (Current):   Vitals:    12/08/17 0947 12/11/17 1415 12/11/17 1427   BP:  (!) 142/91    Pulse:  54    Resp:  16    Temp:  97.5 °F (36.4 °C)    TempSrc:  Temporal    SpO2:  95%

## 2017-12-12 ENCOUNTER — APPOINTMENT (OUTPATIENT)
Dept: GENERAL RADIOLOGY | Age: 42
DRG: 403 | End: 2017-12-12
Attending: SURGERY
Payer: COMMERCIAL

## 2017-12-12 LAB
ANION GAP SERPL CALCULATED.3IONS-SCNC: 14 MEQ/L (ref 8–16)
BUN BLDV-MCNC: 17 MG/DL (ref 7–22)
CALCIUM SERPL-MCNC: 8.8 MG/DL (ref 8.5–10.5)
CHLORIDE BLD-SCNC: 99 MEQ/L (ref 98–111)
CO2: 22 MEQ/L (ref 23–33)
CREAT SERPL-MCNC: 0.9 MG/DL (ref 0.4–1.2)
GFR SERPL CREATININE-BSD FRML MDRD: > 90 ML/MIN/1.73M2
GLUCOSE BLD-MCNC: 149 MG/DL (ref 70–108)
GLUCOSE BLD-MCNC: 164 MG/DL (ref 70–108)
GLUCOSE BLD-MCNC: 205 MG/DL (ref 70–108)
GLUCOSE BLD-MCNC: 247 MG/DL (ref 70–108)
HCT VFR BLD CALC: 43.3 % (ref 42–52)
HEMOGLOBIN: 15.2 GM/DL (ref 14–18)
POTASSIUM SERPL-SCNC: 4.5 MEQ/L (ref 3.5–5.2)
SODIUM BLD-SCNC: 135 MEQ/L (ref 135–145)

## 2017-12-12 PROCEDURE — 80048 BASIC METABOLIC PNL TOTAL CA: CPT

## 2017-12-12 PROCEDURE — 6370000000 HC RX 637 (ALT 250 FOR IP): Performed by: NURSE PRACTITIONER

## 2017-12-12 PROCEDURE — 2500000003 HC RX 250 WO HCPCS: Performed by: SURGERY

## 2017-12-12 PROCEDURE — 6360000002 HC RX W HCPCS: Performed by: SURGERY

## 2017-12-12 PROCEDURE — S0028 INJECTION, FAMOTIDINE, 20 MG: HCPCS | Performed by: SURGERY

## 2017-12-12 PROCEDURE — 85014 HEMATOCRIT: CPT

## 2017-12-12 PROCEDURE — 99024 POSTOP FOLLOW-UP VISIT: CPT | Performed by: NURSE PRACTITIONER

## 2017-12-12 PROCEDURE — 6370000000 HC RX 637 (ALT 250 FOR IP): Performed by: SURGERY

## 2017-12-12 PROCEDURE — 74240 X-RAY XM UPR GI TRC 1CNTRST: CPT

## 2017-12-12 PROCEDURE — 1200000000 HC SEMI PRIVATE

## 2017-12-12 PROCEDURE — 36415 COLL VENOUS BLD VENIPUNCTURE: CPT

## 2017-12-12 PROCEDURE — 6360000002 HC RX W HCPCS: Performed by: NURSE PRACTITIONER

## 2017-12-12 PROCEDURE — 85018 HEMOGLOBIN: CPT

## 2017-12-12 PROCEDURE — 82948 REAGENT STRIP/BLOOD GLUCOSE: CPT

## 2017-12-12 PROCEDURE — 2580000003 HC RX 258: Performed by: SURGERY

## 2017-12-12 PROCEDURE — 6360000004 HC RX CONTRAST MEDICATION: Performed by: SURGERY

## 2017-12-12 RX ORDER — OXYCODONE HYDROCHLORIDE 5 MG/1
5 TABLET ORAL EVERY 4 HOURS PRN
Status: DISCONTINUED | OUTPATIENT
Start: 2017-12-12 | End: 2017-12-13 | Stop reason: HOSPADM

## 2017-12-12 RX ORDER — DEXTROSE MONOHYDRATE 25 G/50ML
12.5 INJECTION, SOLUTION INTRAVENOUS PRN
Status: DISCONTINUED | OUTPATIENT
Start: 2017-12-12 | End: 2017-12-13 | Stop reason: HOSPADM

## 2017-12-12 RX ORDER — OXYCODONE HYDROCHLORIDE 5 MG/1
10 TABLET ORAL EVERY 4 HOURS PRN
Status: DISCONTINUED | OUTPATIENT
Start: 2017-12-12 | End: 2017-12-13 | Stop reason: HOSPADM

## 2017-12-12 RX ORDER — HYOSCYAMINE SULFATE 0.125 MG
125 TABLET,DISINTEGRATING ORAL EVERY 4 HOURS
Status: DISCONTINUED | OUTPATIENT
Start: 2017-12-12 | End: 2017-12-13 | Stop reason: HOSPADM

## 2017-12-12 RX ORDER — NICOTINE POLACRILEX 4 MG
15 LOZENGE BUCCAL PRN
Status: DISCONTINUED | OUTPATIENT
Start: 2017-12-12 | End: 2017-12-13 | Stop reason: HOSPADM

## 2017-12-12 RX ORDER — FLUOXETINE HYDROCHLORIDE 20 MG/1
20 CAPSULE ORAL DAILY
Status: DISCONTINUED | OUTPATIENT
Start: 2017-12-12 | End: 2017-12-13 | Stop reason: HOSPADM

## 2017-12-12 RX ORDER — METOCLOPRAMIDE HYDROCHLORIDE 5 MG/ML
10 INJECTION INTRAMUSCULAR; INTRAVENOUS EVERY 6 HOURS
Status: DISCONTINUED | OUTPATIENT
Start: 2017-12-12 | End: 2017-12-13 | Stop reason: HOSPADM

## 2017-12-12 RX ORDER — DEXTROSE AND SODIUM CHLORIDE 5; .9 G/100ML; G/100ML
100 INJECTION, SOLUTION INTRAVENOUS PRN
Status: DISCONTINUED | OUTPATIENT
Start: 2017-12-12 | End: 2017-12-13 | Stop reason: HOSPADM

## 2017-12-12 RX ORDER — DOCUSATE SODIUM 100 MG/1
100 CAPSULE, LIQUID FILLED ORAL 2 TIMES DAILY PRN
Status: DISCONTINUED | OUTPATIENT
Start: 2017-12-12 | End: 2017-12-13 | Stop reason: HOSPADM

## 2017-12-12 RX ORDER — PROMETHAZINE HYDROCHLORIDE 25 MG/ML
25 INJECTION, SOLUTION INTRAMUSCULAR; INTRAVENOUS EVERY 8 HOURS PRN
Status: DISCONTINUED | OUTPATIENT
Start: 2017-12-12 | End: 2017-12-13 | Stop reason: HOSPADM

## 2017-12-12 RX ADMIN — DIATRIZOATE MEGLUMINE AND DIATRIZOATE SODIUM 30 ML: 600; 100 SOLUTION ORAL; RECTAL at 09:42

## 2017-12-12 RX ADMIN — FLUOXETINE 20 MG: 20 CAPSULE ORAL at 09:52

## 2017-12-12 RX ADMIN — METOCLOPRAMIDE 10 MG: 5 INJECTION, SOLUTION INTRAMUSCULAR; INTRAVENOUS at 14:21

## 2017-12-12 RX ADMIN — BARIUM SULFATE 20 ML: 0.6 SUSPENSION ORAL at 09:41

## 2017-12-12 RX ADMIN — FAMOTIDINE 20 MG: 10 INJECTION, SOLUTION INTRAVENOUS at 07:59

## 2017-12-12 RX ADMIN — ONDANSETRON 4 MG: 2 INJECTION INTRAMUSCULAR; INTRAVENOUS at 16:42

## 2017-12-12 RX ADMIN — SODIUM CHLORIDE: 9 INJECTION, SOLUTION INTRAVENOUS at 03:51

## 2017-12-12 RX ADMIN — HYOSCYAMINE SULFATE 125 MCG: 0.12 TABLET, ORALLY DISINTEGRATING ORAL at 00:11

## 2017-12-12 RX ADMIN — METOCLOPRAMIDE 10 MG: 5 INJECTION, SOLUTION INTRAMUSCULAR; INTRAVENOUS at 20:11

## 2017-12-12 RX ADMIN — ONDANSETRON 4 MG: 2 INJECTION INTRAMUSCULAR; INTRAVENOUS at 23:13

## 2017-12-12 RX ADMIN — FAMOTIDINE 20 MG: 10 INJECTION, SOLUTION INTRAVENOUS at 20:11

## 2017-12-12 RX ADMIN — MORPHINE SULFATE 2 MG: 2 INJECTION, SOLUTION INTRAMUSCULAR; INTRAVENOUS at 14:20

## 2017-12-12 RX ADMIN — MORPHINE SULFATE 4 MG: 2 INJECTION, SOLUTION INTRAMUSCULAR; INTRAVENOUS at 09:15

## 2017-12-12 RX ADMIN — LISINOPRIL 2.5 MG: 2.5 TABLET ORAL at 09:52

## 2017-12-12 RX ADMIN — KETOROLAC TROMETHAMINE 30 MG: 30 INJECTION, SOLUTION INTRAMUSCULAR at 02:33

## 2017-12-12 RX ADMIN — METOCLOPRAMIDE 10 MG: 5 INJECTION, SOLUTION INTRAMUSCULAR; INTRAVENOUS at 09:15

## 2017-12-12 RX ADMIN — KETOROLAC TROMETHAMINE 30 MG: 30 INJECTION, SOLUTION INTRAMUSCULAR at 07:59

## 2017-12-12 RX ADMIN — ENOXAPARIN SODIUM 40 MG: 40 INJECTION SUBCUTANEOUS at 09:51

## 2017-12-12 RX ADMIN — ONDANSETRON 4 MG: 2 INJECTION INTRAMUSCULAR; INTRAVENOUS at 11:15

## 2017-12-12 RX ADMIN — Medication 10 ML: at 07:59

## 2017-12-12 RX ADMIN — HYOSCYAMINE SULFATE 125 MCG: 0.12 TABLET, ORALLY DISINTEGRATING ORAL at 11:15

## 2017-12-12 RX ADMIN — SODIUM CHLORIDE: 9 INJECTION, SOLUTION INTRAVENOUS at 12:54

## 2017-12-12 RX ADMIN — MORPHINE SULFATE 4 MG: 2 INJECTION, SOLUTION INTRAMUSCULAR; INTRAVENOUS at 03:51

## 2017-12-12 RX ADMIN — SODIUM CHLORIDE: 9 INJECTION, SOLUTION INTRAVENOUS at 20:24

## 2017-12-12 RX ADMIN — ONDANSETRON 4 MG: 2 INJECTION INTRAMUSCULAR; INTRAVENOUS at 04:39

## 2017-12-12 RX ADMIN — MORPHINE SULFATE 2 MG: 2 INJECTION, SOLUTION INTRAMUSCULAR; INTRAVENOUS at 23:18

## 2017-12-12 RX ADMIN — KETOROLAC TROMETHAMINE 30 MG: 30 INJECTION, SOLUTION INTRAMUSCULAR at 16:42

## 2017-12-12 ASSESSMENT — PAIN SCALES - GENERAL
PAINLEVEL_OUTOF10: 4
PAINLEVEL_OUTOF10: 5
PAINLEVEL_OUTOF10: 7
PAINLEVEL_OUTOF10: 5
PAINLEVEL_OUTOF10: 7
PAINLEVEL_OUTOF10: 4
PAINLEVEL_OUTOF10: 6
PAINLEVEL_OUTOF10: 7
PAINLEVEL_OUTOF10: 6
PAINLEVEL_OUTOF10: 7
PAINLEVEL_OUTOF10: 7
PAINLEVEL_OUTOF10: 4

## 2017-12-12 ASSESSMENT — PAIN DESCRIPTION - DESCRIPTORS: DESCRIPTORS: DISCOMFORT

## 2017-12-12 ASSESSMENT — PAIN DESCRIPTION - LOCATION
LOCATION: ABDOMEN
LOCATION: ABDOMEN

## 2017-12-12 ASSESSMENT — PAIN DESCRIPTION - PAIN TYPE
TYPE: SURGICAL PAIN
TYPE: SURGICAL PAIN

## 2017-12-12 ASSESSMENT — PAIN DESCRIPTION - ORIENTATION: ORIENTATION: ANTERIOR

## 2017-12-12 NOTE — FLOWSHEET NOTE
A 38year old male admitted to 7K25 per bed from PACU. Awake and alert on admission. C/o post-op abdominal pain of \"10\". 6 surgical stab sites to abd with steri strips and bandaids intact. Abdominal binder on and ice to abd. Wife present. O2 is on at 4L per nasal cannula. IV of NS infusing in left hand with no redness or swelling at site.  See doc flow sheet for post-op VS.

## 2017-12-12 NOTE — FLOWSHEET NOTE
Pt expressed some regrets over some issues in his life and wanted prayer to heal and he was anointed. 12/12/17 1505   Encounter Summary   Services provided to: Patient   Referral/Consult From: [facility] Family members   Place of [facility] Visiting Yes  (12/12 )   Complexity of Encounter Moderate   Length of Encounter 30 minutes   Routine   Type Initial   Spiritual/Latter day   Type Spiritual support   Assessment Approachable;Calm; Hopeful   Intervention Prayer;Nurtured hope; Active listening;Empowerment;Sustaining presence/ Ministry of presence   Outcome Connection/belonging;Expressed gratitude;Expressed regrets;Encouraged; Hopeful;Receptive   Sacraments   Sacrament of Sick-Anointing Anointed

## 2017-12-12 NOTE — CARE COORDINATION
12/12/17, 12:03 PM      Dong Woodward       Admitted from: pacu 12/11/2017/ [address] [facility] day: 1   [facility]: [facility] Reason for admit: Obesity [E66.9] Status: [facility]  Admit order signed?: yes  PMH:  has a past medical history of Chronic back pain; Diabetes mellitus ([address] Hyperlipidemia; and Hypertension. Procedure: 12/11/17 surgery by Dr Sergio Becerril: Robotic sleeve gastrectomy. Pertinent abnormal Imaging:no  Medications:  Scheduled Meds:   insulin lispro  0-6 Units Subcutaneous TID WC    insulin lispro  0-3 Units Subcutaneous Nightly    FLUoxetine  20 mg Oral Daily    metoclopramide  10 mg Intravenous Q6H    hyoscyamine  125 mcg Oral Q4H    lisinopril  2.5 mg Oral Daily    sodium chloride flush  10 mL Intravenous 2 times per day    ketorolac  30 mg Intravenous Q8H    enoxaparin  40 mg Subcutaneous 2 times per day    ondansetron  4 mg Intravenous Q6H    scopolamine  1 patch Transdermal Q72H    famotidine (PEPCID) injection  20 mg Intravenous BID     Continuous Infusions:   dextrose 5 % and 0.9 % NaCl      sodium chloride 125 mL/hr at 12/12/17 0351      Pertinent Info/Orders/Treatment Plan:  Start sugar-free, caffeine-free clear liquids. Up to 2 oz every 15 minutes. No carbonation. No straws. Have patient track intake. Strict I/O's. Ambulate in hallway every 2 hours with assist. Pain management. Abdominal binder. Ice to abdomen     DVT Prophylaxis: Lovenox  Smoking status:  reports that he quit smoking about 6 years ago. He has quit using smokeless tobacco. His smokeless tobacco use included Chew.    Influenza Vaccination Screening Completed: yes  Pneumonia Vaccination Screening Completed: yes  Core measures: vte  PCP: Nelida Jimenez DO  Readmission:   no  Risk Score: 2.5     Discharge Planning  Current Residence:  Private Residence  Living Arrangements:  Spouse/Significant Other   Support Systems:  Spouse/Significant Other  Current Services PTA:     Potential Assistance Needed:  Home Care  Potential Assistance Purchasing Medications:  No  Does patient want to participate in local refill/ meds to beds program?  No  Type of Home Care Services:  Nursing Services  Patient expects to be discharged to:  Home with wife. [facility]  Expected Discharge date:  12/13/17  Follow Up Appointment: Best Day/ Time: Wednesday PM    Discharge Plan: I spoke with Kimmie Weeks. He is planning to go home with his wife this evening or tomorrow. Agreeable to [facility] nursing visit as ordered. [facility] referral called to [facility] admissions nurse     Evaluation: no  12/12/17, 12:08 PM    Discharge plan discussed by  and . Discharge plan reviewed with patient/ family. Patient/ family verbalize understanding of discharge plan and are in agreement with plan. Understanding was demonstrated using the teach back method.    Services After Discharge  Services At/After Discharge: Nursing Services

## 2017-12-12 NOTE — DISCHARGE INSTR - DIET

## 2017-12-12 NOTE — PLAN OF CARE
Problem: Pain:  Goal: Pain level will decrease  Pain level will decrease   Outcome: Ongoing  Post-op abdominal pain improves after given IV morphine. Patient states he is able to rest. Patient also gives a pain goal of \"4\" and is able to achieve his goal after medicated. Goal: Control of acute pain  Control of acute pain   Outcome: Ongoing  Improved after iv pain medication. Goal: Control of chronic pain  Control of chronic pain   Outcome: Ongoing      Problem: Falls - Risk of  Goal: Absence of falls  Outcome: Met This Shift  Patient verbalizes understanding of fall precautions when discussed. He is compliant with wearing non-skid slippers when out of bed. Uses call light appropriately. Problem: Cardiovascular  Goal: No DVT, peripheral vascular complications  Outcome: Ongoing  Patient denies calf pain or tenderness. He is complinat with wearing scd's when in bed. He is also compliant with ambulating every 2 hrs. Problem: Respiratory  Goal: No pulmonary complications  Outcome: Ongoing  Patient's lung sounds are clear. He reports importance of using incentive spirometer to prevent pulmonary complications. Goal: O2 Sat > 90%  Outcome: Met This Shift  Oxygen level is >90% with O2 on at 4L/min. Problem: Nutrition  Goal: Optimal nutrition therapy  Outcome: Not Met This Shift  Patient is currently npo except ice chips. IV of NS infusing at 125ml/hr. Problem: Skin Integrity/Risk  Goal: No skin breakdown during hospitalization  Outcome: Met This Shift  No areas of breakdown noted upon inspection except for 6 surgical stab sites to abdomen following gastric sleeve surgery. Comments: Care plan reviewed with patient and wife. Patient and wife verbalize understanding of the plan of care and contribute to goal setting.

## 2017-12-12 NOTE — PROGRESS NOTES
[facility]  Postoperative Bariatric Progress Note    Pt Name: Keysha Reinoso Parent  Medical Record Number: [id number]  Date of Birth 1975   Today's Date: 12/12/2017    ASSESSMENT   1. POD # 1 S/p robotic assisted sleeve gastrectomy  2. Morbid obesity BMI 36  3. Diabetes Mellitus  4. Hypertension   has a past medical history of Chronic back pain; Diabetes mellitus ([address] Hyperlipidemia; and Hypertension. PLAN   1. Upper GI pending. Per radiology gastrografin not moving through as quickly as should. Repeating X-ray this morning. Will await results. If imaging looks okay without evidence or leak or obstruction then may advance to sugar free clear liquids. Strict I&Os. 2. IV hydration  3. Voiding spontaneously   4. Chems every 6 hours. SS insulin discussed. Patient refusing at this time. Explained that oral medications would be on hold for now. Patient understands. 5. Resume home prozac  6. DVT & GI prophylaxis  7. Stool softeners as needed  8. Routine incisional care  9. Pain & nausea control  10. Scheduled Reglan and oscimin for promotility   11. Ambulate every 1-2 hours   12. Labs reviewed  13. Clinically, looks good. Will check on patient later and see how he is doing with liquids as long as upper GI looks okay. Possible discharge later tonight if doing well. SUBJECTIVE   Patient was stable overnight. Chart reviewed. Updated by nursing staff. Has some chest discomfort. No spasms. Nausea, but manageable. No vomiting. (+) belching. No flatus or BM. Tolerating Diet NPO Effective Now Exceptions are: Ice Chips, Sips with Meds diet. Pain controlled with analgesia. Up ad terry. Urinating without difficulty. Incisions are clean, dry and intact.   CURRENT MEDICATIONS   Scheduled Meds:   insulin lispro  0-6 Units Subcutaneous TID WC    insulin lispro  0-3 Units Subcutaneous Nightly    lisinopril  2.5 mg Oral Daily    sodium chloride flush  10 mL Intravenous 2 times per day    ketorolac  30 mg Intravenous Q8H    enoxaparin  40 mg Subcutaneous 2 times per day    ondansetron  4 mg Intravenous Q6H    scopolamine  1 patch Transdermal Q72H    metoclopramide        famotidine (PEPCID) injection  20 mg Intravenous BID     Continuous Infusions:   dextrose      sodium chloride 125 mL/hr at 17 0351     PRN Meds:.glucose, dextrose, glucagon (rDNA), dextrose, sodium chloride flush, acetaminophen, morphine **OR** morphine, metoclopramide, promethazine, hyoscyamine  OBJECTIVE   CURRENT VITALS:  height is 6' 1\" (1.854 m) and weight is 273 lb 9.6 oz (124.1 kg). His oral temperature is 97.8 °F (36.6 °C). His blood pressure is 134/63 and his pulse is 82. His respiration is 18 and oxygen saturation is 93%. Temperature Range (24h):Temp: 97.8 °F (36.6 °C) Temp  Av.1 °F (36.7 °C)  Min: 97.5 °F (36.4 °C)  Max: 98.6 °F (37 °C)  BP Range (75K): Systolic (49PDD), HSE:976 , Min:105 , MML:835     Diastolic (32LZL), KEU:82, Min:58, Max:123    Pulse Range (24h): Pulse  Av.4  Min: 48  Max: 98  Respiration Range (24h): Resp  Av.1  Min: 0  Max: 40  Current Pulse Ox (24h):  SpO2: 93 %  Pulse Ox Range (24h):  SpO2  Av.2 %  Min: 87 %  Max: 100 %  Oxygen Amount and Delivery: O2 Flow Rate (L/min): 4 L/min  Incentive Spirometry Tx:   Treatment Tolerance: Fair Incentive Spirometry Goal (mL): 2000 mL Incentive Spirometry Achieved (mL): 250 mL    GENERAL: alert, no distress  LUNGS: clear to ausculation, without wheezes, rales or rhonci  HEART: normal rate and regular rhythm  ABDOMEN: non-distended, soft, incisional tenderness, bowel sounds present in all 4 quadrants and no guarding or peritoneal signs  INCISION: healing well, no significant drainage, no significant erythema  EXTERMITY: no cyanosis, clubbing or edema  In: 3183 [I.V.:3183]  Out: 1075 [Urine:1075]    Date 17 0000 - 17 2359   Shift 7136-0173 9209-0642 7211-0475 24 Hour Total   I  N  T  A  K  E   P. O. 0   0 I.V.  (mL/kg/hr) 1015   1015    Shift Total  (mL/kg) 1015  (8.2)   1015  (8.2)   O  U  T  P  U  T   Urine  (mL/kg/hr) 625   625    Emesis/NG output 0   0    Stool 0   0    Blood 0   0    Shift Total  (mL/kg) 625  (5)   625  (5)   Weight (kg) 124.1 124.1 124.1 124.1     LABS     Recent Labs      12/12/17   0601   HGB  15.2   HCT  43.3   NA  135   K  4.5   CL  99   CO2  22*   BUN  17   CREATININE  0.9   CALCIUM  8.8      RADIOLOGY   Upper GI pending    Electronically signed by Santy Stephens CNP on 12/12/2017 at 7:24 AM     Patient seen and examined independently by me early this AM. Above discussed and I agree with Leigh Lozano CNP. See my additional comments below for updated orders and plan. Labs, cultures, and radiographs where available were reviewed. I discussed patient concerns with the patient's nurse and instructions were given. Please see our orders for the updated patient care plan. - Upper GI in process. If upper GI okay to start clear liquid diet. Ambulate. Pain control. DVT prophylaxis. Abdomen benign. Accu-Cheks with treatment as needed by sliding scale insulin. Home later today/tomorrow depending how he progresses with liquid diet today. Clinically, looks good.     Electronically signed by Rosalinda Luna MD on 12/12/2017 at 10:11 AM

## 2017-12-12 NOTE — OP NOTE
[address] [place] [address]                                 OPERATIVE REPORT    PATIENT NAME: Kelechi Bello                      :        1975  MED REC NO:   [id number]                           ROOM:       0025  ACCOUNT NO:   [de-identified]                           ADMIT DATE: 2017  PROVIDER:     Chetna Medeiros M.D.    April Julien OF PROCEDURE:  2017    PREOPERATIVE DIAGNOSES:  1.  Obesity. 2.  BMI of 36.  3.  Diabetes mellitus. 4.  Hypertension. POSTOPERATIVE DIAGNOSES:  1.  Obesity. 2.  BMI of 36.  3.  Diabetes mellitus. 4.  Hypertension. PROCEDURE:  Robotic sleeve gastrectomy. SURGEON:  Chetna Medeiros M.D.    ASSISTANT:  Floridalma Lerner. ATA Boyer. ANESTHESIA:  General/local.    ESTIMATED BLOOD LOSS:  20 mL. DRAINS:  None. COMPLICATIONS:  None. DISPOSITION:  Stable to the recovery room. INDICATIONS:  The patient is a 82-year-old gentleman who had been seen in  the weight management program secondary to his obesity. He had multiple  comorbid conditions including diabetes mellitus. He was not able to lose  enough adequate excess body weight with medical management only. He wished  to proceed with a robotic sleeve gastrectomy. The risks of surgery were  then further discussed. Some of the risks included but were not limited to  bleeding, infection, the need for reoperation, severe chronic postoperative  pain or numbness, major vascular or nerve injury, cardiopulmonary  complications, anesthetic complications, seroma/hematoma formation, wound  breakdown, trocar site herniation, staple line breakdown/leak, stricture,  chronic nausea or pain, torsion of the sleeve, bleeding from the staple  line and death. After all of the questions were answered in their entirety  and the patient was completely aware of the current situation, he elected  to proceed with the procedure.   DESCRIPTION OF PROCEDURE: After informed consent was signed and placed on  the chart, the patient was taken back to the operating room, placed supine  on the operating room table. General anesthesia was induced. He tolerated  this well throughout the case. All pressure points were padded. He was on  preoperative antibiotics. Bilateral lower extremity sequential compression  devices were placed prior to incision. His abdomen and pelvis were prepped  and draped in the usual sterile standard fashion. A time-out occurred  prior to the operation which not only identified the patient, but also the  planned procedure to be performed. At the end of the time-out, there were  no questions or concerns. I began the operation first by making a small  transverse incision just to the right of the umbilicus. Using low-flow  insufflation and the OptiView, the abdomen was entered into without  difficulty. Intraabdominal cavity was insufflated to a pressure of  approximately 15 mmHg with carbon dioxide gas. Laparoscope was inserted. Upon initial evaluation, there was no hollow viscus, solid organ, or major  vascular injury. An 8 mm trocar x4 was then placed in the standard  location under direct vision. A 5 mm trocar was then placed in the far  right lateral abdominal region under direct vision. Liver retractor was  then placed without difficulty. The 11 mm trocar was converted to a 15 mm  trocar for the stapler. The patient was placed in a steep reverse  Trendelenburg. Robot brought in and docked. Instruments were placed by  myself under direct vision. Once everything was aligned and in order, I  then unscrubbed and went back to the console. I began the operation first  by evaluating the hiatus. He had no significant hiatal hernia. The lesser  sac was then entered into with the vessel sealer along the greater  curvature through the mesentery. This was then unzipped distal to about  5-6 cm from the pylorus.   It was then unzipped 0.5%  Marcaine with epinephrine was used for local anesthetic. The patient  tolerated the injection of local anesthetic without difficulty. Sponge,  needle, and instrumentation counts were correct at the end of the  procedure. The patient tolerated the procedure well with no apparent  complications and only about 10 to 20 mL of blood loss. He was able to be  brought out of general anesthesia and transferred to the postanesthesia  care unit in stable condition.         Nikki Mayes M.D.      D: 12/11/2017 17:47:29       T: 12/11/2017 23:23:54     [email or web address]  Job#: [id number]     Doc#: [id number]    CC:

## 2017-12-13 ENCOUNTER — TELEPHONE (OUTPATIENT)
Dept: FAMILY MEDICINE CLINIC | Age: 42
End: 2017-12-13

## 2017-12-13 VITALS
WEIGHT: 273.6 LBS | BODY MASS INDEX: 36.26 KG/M2 | DIASTOLIC BLOOD PRESSURE: 90 MMHG | TEMPERATURE: 97.9 F | OXYGEN SATURATION: 97 % | SYSTOLIC BLOOD PRESSURE: 178 MMHG | RESPIRATION RATE: 16 BRPM | HEIGHT: 73 IN | HEART RATE: 52 BPM

## 2017-12-13 LAB
GLUCOSE BLD-MCNC: 149 MG/DL (ref 70–108)
GLUCOSE BLD-MCNC: 175 MG/DL (ref 70–108)

## 2017-12-13 PROCEDURE — 2580000003 HC RX 258: Performed by: SURGERY

## 2017-12-13 PROCEDURE — 6370000000 HC RX 637 (ALT 250 FOR IP): Performed by: SURGERY

## 2017-12-13 PROCEDURE — 6360000002 HC RX W HCPCS: Performed by: NURSE PRACTITIONER

## 2017-12-13 PROCEDURE — S0028 INJECTION, FAMOTIDINE, 20 MG: HCPCS | Performed by: SURGERY

## 2017-12-13 PROCEDURE — 99024 POSTOP FOLLOW-UP VISIT: CPT | Performed by: NURSE PRACTITIONER

## 2017-12-13 PROCEDURE — 6360000002 HC RX W HCPCS: Performed by: SURGERY

## 2017-12-13 PROCEDURE — 6370000000 HC RX 637 (ALT 250 FOR IP): Performed by: NURSE PRACTITIONER

## 2017-12-13 PROCEDURE — 2500000003 HC RX 250 WO HCPCS: Performed by: SURGERY

## 2017-12-13 PROCEDURE — 82948 REAGENT STRIP/BLOOD GLUCOSE: CPT

## 2017-12-13 PROCEDURE — 99999 PR OFFICE/OUTPT VISIT,PROCEDURE ONLY: CPT | Performed by: NURSE PRACTITIONER

## 2017-12-13 RX ORDER — HYOSCYAMINE SULFATE 0.125 MG
125 TABLET,DISINTEGRATING ORAL EVERY 4 HOURS PRN
Qty: 20 TABLET | Refills: 0 | Status: SHIPPED | OUTPATIENT
Start: 2017-12-13 | End: 2017-12-19

## 2017-12-13 RX ORDER — KETOROLAC TROMETHAMINE 10 MG/1
10 TABLET, FILM COATED ORAL EVERY 8 HOURS PRN
Qty: 20 TABLET | Refills: 0 | Status: SHIPPED | OUTPATIENT
Start: 2017-12-13 | End: 2018-01-03

## 2017-12-13 RX ORDER — OXYCODONE HYDROCHLORIDE 5 MG/1
5 TABLET ORAL EVERY 6 HOURS PRN
Qty: 25 TABLET | Refills: 0 | Status: SHIPPED | OUTPATIENT
Start: 2017-12-13 | End: 2017-12-20

## 2017-12-13 RX ADMIN — ENOXAPARIN SODIUM 40 MG: 40 INJECTION SUBCUTANEOUS at 09:21

## 2017-12-13 RX ADMIN — FLUOXETINE 20 MG: 20 CAPSULE ORAL at 09:20

## 2017-12-13 RX ADMIN — KETOROLAC TROMETHAMINE 30 MG: 30 INJECTION, SOLUTION INTRAMUSCULAR at 01:16

## 2017-12-13 RX ADMIN — ONDANSETRON 4 MG: 2 INJECTION INTRAMUSCULAR; INTRAVENOUS at 10:49

## 2017-12-13 RX ADMIN — SODIUM CHLORIDE: 9 INJECTION, SOLUTION INTRAVENOUS at 04:29

## 2017-12-13 RX ADMIN — Medication 10 ML: at 09:21

## 2017-12-13 RX ADMIN — KETOROLAC TROMETHAMINE 30 MG: 30 INJECTION, SOLUTION INTRAMUSCULAR at 10:49

## 2017-12-13 RX ADMIN — FAMOTIDINE 20 MG: 10 INJECTION, SOLUTION INTRAVENOUS at 09:21

## 2017-12-13 RX ADMIN — MORPHINE SULFATE 2 MG: 2 INJECTION, SOLUTION INTRAMUSCULAR; INTRAVENOUS at 09:21

## 2017-12-13 RX ADMIN — METOCLOPRAMIDE 10 MG: 5 INJECTION, SOLUTION INTRAMUSCULAR; INTRAVENOUS at 04:26

## 2017-12-13 RX ADMIN — METOCLOPRAMIDE 10 MG: 5 INJECTION, SOLUTION INTRAMUSCULAR; INTRAVENOUS at 09:22

## 2017-12-13 RX ADMIN — LISINOPRIL 2.5 MG: 2.5 TABLET ORAL at 09:20

## 2017-12-13 RX ADMIN — ONDANSETRON 4 MG: 2 INJECTION INTRAMUSCULAR; INTRAVENOUS at 05:30

## 2017-12-13 ASSESSMENT — PAIN DESCRIPTION - FREQUENCY: FREQUENCY: INTERMITTENT

## 2017-12-13 ASSESSMENT — PAIN DESCRIPTION - PAIN TYPE
TYPE: SURGICAL PAIN

## 2017-12-13 ASSESSMENT — PAIN SCALES - GENERAL
PAINLEVEL_OUTOF10: 7
PAINLEVEL_OUTOF10: 3
PAINLEVEL_OUTOF10: 4
PAINLEVEL_OUTOF10: 7
PAINLEVEL_OUTOF10: 8

## 2017-12-13 ASSESSMENT — PAIN DESCRIPTION - LOCATION
LOCATION: ABDOMEN

## 2017-12-13 ASSESSMENT — PAIN DESCRIPTION - DESCRIPTORS
DESCRIPTORS: ACHING
DESCRIPTORS: DISCOMFORT
DESCRIPTORS: ACHING;DISCOMFORT

## 2017-12-13 ASSESSMENT — PAIN DESCRIPTION - ORIENTATION: ORIENTATION: ANTERIOR

## 2017-12-13 NOTE — LETTER
[place] [id number]  Phone: [phone]  Fax: [phone]    Carin Lopez, DO        December 19, 2017    Keysha Reinoso [facility] [address] Celia Rosas:    We have made several attempts to contact you by phone and have   been unsuccessful. Please call our office at your earliest convenience  At [phone] opt 3.         Sincerely,        Carin Lopez, DO

## 2017-12-13 NOTE — DISCHARGE SUMMARY
Shena Morel [address] [facility]       Pt Name: Vince Woodward  MRN: [id number]  YOB: 1975  Primary Care Physician: Ariana Kelley DO    Admit date:  12/11/2017  2:07 PM      Discharge date:  12/12/17 3:00 PM  Admitting Diagnosis:   1. Obesity. 2.  BMI of 36.  3.  Diabetes mellitus. 4.  Hypertension.     Discharge Diagnosis:   1. Obesity. 2.  BMI of 36.  3.  Diabetes mellitus. 4.  Hypertension.   5.  Status post robotic sleeve gastrectomy     Admitting Service: General Surgery, Corey Herrera MD.    Consultants:  none    History and Physical:  Patient ID: Vince Woodward is a 38 y.o. male             Chief Complaint   Patient presents with    Follow-up       H&P visit; initial 280lb 12.8oz, last 279.6lb, current 284.6lb      HPI  Hafsa Lopez is a 74-year-old male who presents for follow-up at the weight management program secondary to his obesity.  BMI 40. [facility] has not been able to lose enough adequate excess body weight with medical management only. [facility] wishes to proceed with a robotic sleeve gastrectomy. [facility] has completed psychology evaluation.  Completed upper endoscopy.  Attended support groups.  Attended fitness orientation.  Following up with dietitian's.  Trying to follow recommendations including improving his nutritional choices and portion control.  Still working really hard with improving his diabetes control.  Hemoglobin A1c improving.  Denying any current chest pain or shortness of breath.  No abdominal pain.  No hematochezia or melena.  No fever, chills or sweats.  No new urinary complaints.  He is excited to proceed with surgical correction so as to continue his weight loss journey.     Review of Systems   Constitutional: Negative.    HENT: Negative.    Eyes: Negative.    Respiratory: Negative.    Cardiovascular: Negative.    Gastrointestinal: Negative.    Endocrine: Negative.    Genitourinary: Negative.    Musculoskeletal: Negative.    Skin: Negative.    Allergic/Immunologic: Negative.    Neurological: Negative.    Hematological: Negative.    Psychiatric/Behavioral: Negative.       Past Medical History           Past Medical History:   Diagnosis Date    Chronic back pain      Diabetes mellitus ([address]      Hyperlipidemia      Hypertension               Past Surgical History         Past Surgical History:   Procedure Laterality Date    LYMPHADENECTOMY 1978             Current Facility-Administered Medications               Current Outpatient Prescriptions   Medication Sig Dispense Refill    [START ON 12/11/2017] omeprazole (PRILOSEC) 40 MG delayed release capsule Take 1 capsule by mouth daily Open capsule and take in liquid 30 capsule 2    Multiple Vitamin (MULTI VITAMIN) TABS Take 1 tablet by mouth daily 30 tablet 5    Cholecalciferol (VITAMIN D3) 1000 units CAPS Take 1 tablet by mouth daily 30 capsule 5    Lancets 30G MISC 1 each by Does not apply route daily 100 each 5    Alcohol Swabs (ALCOHOL WIPES) 70 % PADS Use q daily with glucometer 100 each 5    SITagliptin (JANUVIA) 100 MG tablet Take 1 tablet by mouth daily 30 tablet 5    metFORMIN (GLUCOPHAGE) 1000 MG tablet take 1 tablet by mouth twice a day with meals 60 tablet 5    lisinopril (ZESTRIL) 2.5 MG tablet Take 1 tablet by mouth daily 30 tablet 5    glipiZIDE (GLUCOTROL) 10 MG tablet Take 1 tablet by mouth 2 times daily (before meals) 60 tablet 5    FLUoxetine (PROZAC) 20 MG capsule Take 1 capsule by mouth daily 30 capsule 5    aspirin (RA ASPIRIN EC) 81 MG EC tablet take 1 tablet by mouth once daily 30 tablet 11    Glucose Blood (BLOOD GLUCOSE TEST STRIPS) STRP True Metrix Test strips.  Check blood sugar once daily and PRN.  100 strip 2    Blood Glucose Monitoring Suppl BRYAN True Metrix meter.  Check blood sugar once daily and PRN 1 Device 0    [START ON 12/11/2017] metoclopramide (REGLAN) 10 MG tablet Take 1 tablet by mouth every 6 hours as needed (nausea/vomiting) 30 tablet 0    [START ON 12/11/2017] enoxaparin (LOVENOX) 40 MG/0.4ML injection Inject 0.4 mLs into the skin daily for 14 days 14 Syringe 0    [START ON 12/11/2017] Docusate Sodium 100 MG TABS Take 100 mg by mouth 2 times daily Take as needed to prevent constipation 30 tablet 1    [START ON 12/11/2017] ondansetron (ZOFRAN) 4 MG tablet Take 1 tablet by mouth every 4 hours as needed for Nausea or Vomiting 30 tablet 0      No current facility-administered medications for this visit.             No Known Allergies     Family History             Family History   Problem Relation Age of Onset    Diabetes Mother      Thyroid Disease Mother      Obesity Mother      Obesity Father      Diabetes Sister      Obesity Sister      Diabetes Maternal Grandmother      Obesity Maternal Grandmother      Cancer Paternal Grandmother      Heart Disease Paternal Grandfather               Social History   Social History                Social History    Marital status:        Spouse name: N/A    Number of children: N/A    Years of education: N/A            Occupational History    Not on file.                Social History Main Topics    Smoking status: Former Smoker       Quit date: 2011    Smokeless tobacco: Former User       Types: Chew    Alcohol use No    Drug use: No    Sexual activity: Not on file              Other Topics Concern    Not on file            Social History Narrative    No narrative on file         /80 (Site: Right Arm, Position: Sitting, Cuff Size: Large Adult)   Pulse 60   Temp 97.8 °F (36.6 °C) (Oral)   Resp 18   Ht 6' 1.5\" (1.867 m)   Wt 284 lb 9.6 oz (129.1 kg)   BMI 37.04 kg/m²      Body mass index is 37.04 kg/m².     Objective:   Physical Exam   Constitutional: He is oriented to person, place, and time. He appears well-developed and well-nourished. No distress. HENT:   Head: Normocephalic and atraumatic.    Right Ear: External ear normal.   Left Ear: External ear normal.   Nose: Nose normal.   Mouth/Throat: Oropharynx is clear and moist and mucous membranes are normal. No oropharyngeal exudate. Eyes: Conjunctivae and EOM are normal. Pupils are equal, round, and reactive to light. Right eye exhibits no discharge. Left eye exhibits no discharge. No scleral icterus. Neck: Trachea normal, normal range of motion, full passive range of motion without pain and phonation normal. Neck supple. No JVD present. Cardiovascular: Normal rate, regular rhythm, S1 normal, S2 normal and normal heart sounds.  Exam reveals no gallop and no friction rub.    No murmur heard. Pulmonary/Chest: Effort normal and breath sounds normal. No respiratory distress. He has no decreased breath sounds. He has no wheezes. He has no rhonchi. He has no rales. He exhibits no tenderness and no deformity. Abdominal: Soft. Bowel sounds are normal. He exhibits no distension, no abdominal bruit and no mass. There is no hepatosplenomegaly. There is no tenderness. There is no rigidity, no rebound and no guarding. No hernia. Hernia confirmed negative in the ventral area. Musculoskeletal: Normal range of motion. He exhibits no edema or tenderness. Neurological: He is alert and oriented to person, place, and time. He has normal strength. No cranial nerve deficit or sensory deficit. Skin: Skin is warm and dry. No rash noted. He is not diaphoretic. No erythema. No pallor. Psychiatric: He has a normal mood and affect.  His speech is normal and behavior is normal. Judgment and thought content normal. Cognition and memory are normal.   Vitals reviewed.        CBC             Lab Results   Component Value Date     WBC 8.1 10/30/2017     RBC 5.05 10/30/2017     HGB 16.1 10/30/2017     HCT 46.2 10/30/2017     MCV 91.4 10/30/2017     MCH 31.9 10/30/2017     MCHC 34.9 10/30/2017     RDW 12.7 10/30/2017      10/30/2017     MPV 9.6 10/30/2017     RBCMORP NORMAL 05/01/2017     SEGSPCT 44.5 10/30/2017   LABLYMP 46.3 10/30/2017     MONOPCT 8.0 10/30/2017     LABEOS 0.9 10/30/2017     BASO 0.3 10/30/2017     NRBC 0 10/30/2017     SEGSABS 3.6 10/30/2017     LYMPHSABS 3.8 10/30/2017     MONOSABS 0.6 10/30/2017     EOSABS 0.1 10/30/2017     BASOSABS 0.0 10/30/2017         BMP/CMP             Lab Results   Component Value Date     GLUCOSE 95 10/30/2017     GLUCOSE 132 07/15/2011     CREATININE 0.9 10/30/2017     BUN 10 10/30/2017      10/30/2017     K 4.3 10/30/2017      10/30/2017     CO2 25 10/30/2017     CALCIUM 9.1 10/30/2017     AST 34 05/01/2017     ALKPHOS 58 05/01/2017     PROT 7.5 05/01/2017     LABALBU 4.5 05/01/2017     LABALBU 4.6 07/15/2011     BILITOT 0.6 05/01/2017     ALT 65 05/01/2017         PREALBUMIN             Lab Results   Component Value Date     PREALBUMIN 31.5 05/01/2017         VITAMIN B12             Lab Results   Component Value Date     RTEYIACH77 746 05/01/2017         24 HOUR URINE CALCIUM   No results found for: JASMIN DonahueUR      VITAMIN D             Lab Results   Component Value Date     VITD25 26 09/18/2017         VITAMIN B1/ THIAMINE             Lab Results   Component Value Date     [id number] 78 05/01/2017         RBC FOLATE             Lab Results   Component Value Date     FOLATE 13.0 05/01/2017         LIPID SCREEN (FASTING)             Lab Results   Component Value Date     CHOL 228 05/01/2017     TRIG 192 05/01/2017     HDL 40 05/01/2017     LDLCALC 150 05/01/2017   ,      HGA1C (T2DM ONLY)             Lab Results   Component Value Date     LABA1C 8.2 10/30/2017     AVGG 186 10/30/2017         TSH             Lab Results   Component Value Date     TSH 1.600 05/01/2017         IRON         Lab Results   Component Value Date     IRON 90 05/01/2017         IONIZED CALCIUM   No results found for: Sangeeta Lizarraga     Imaging -   Narrative   PROCEDURE: XR CHEST STANDARD TWO VW       CLINICAL INFORMATION: Severe obesity (BMI 35.0-39. 9) with comorbidity [facility])       COMPARISON: 3/4/2014       TECHNIQUE: PA and lateral views of the chest were obtained.               Impression   Normal chest. No acute findings.          EGD: in process           Patient Active Problem List   Diagnosis    Uncontrolled type 2 diabetes mellitus, without long-term current use of insulin ([facility] [address]    Microalbuminuria due to type 2 diabetes mellitus ([facility]    Recurrent major depressive disorder, in partial remission ([facility]    Vitamin D deficiency    Depression    Essential hypertension    Uncontrolled type 2 diabetes mellitus without complication, without long-term current use of insulin ([facility])         Assessment:   1.  Obesity (BMI 37)  2.  Diabetes mellitus  3.  Hypercholesterolemia  4.  Hypertension  5.  Lower back pain  6.  Vitamin D deficiency  7.  Depression     Plan:   1. Discussion again about the pros and cons of weight loss surgery.  The risks benefits and alternatives to laparoscopic adjustable band, gastric sleeve and gastric Dennys-en-Y bypass were discussed in detail.  The pros and cons of robotic assisted, laparoscopic and open techniques were discussed. 2.  Behavior modification discussed in detail in regards to dietary habits. 3.  Nutritional education occurred during visit.  Follow-up with dietitian for further evaluation.  and continue to follow their recommendations as directed  4.  Options for medical management of morbid obesity discussed. 5.  Improvement in fitness/exercise discussed with patient and the need for this with/without surgery. 6.  Medical necessity letter from PCP. 7.  Follow-up in one week after surgery in the weight management program at [facility]. 8.  Signs and symptoms reviewed with patient that would be concerning and need him to return to office for re-evaluation. Patient states he will call if he has questions or concerns.   9. Multivitamin  10. Psychology evaluation completed.  Follow-up as needed.    11. EGD Completed.  Results reviewed. 12.  Encouraged support groups  13.  Discussed again the pros and cons with possible side effects in the weight loss medication  14. Scheduled [facility] for robotic sleeve gastrectomy. 15. He has undergone pre-operative clearance per anesthesia guidelines with risk factors listed under the past medical history diagnosis & problem list.  16. The risks, benefits and alternatives were discussed with [facility] including non-operative management.  The pros and cons of robotic, laparoscopic and open techniques were discussed in detail.  All questions answered. He understands and wishes to proceed with surgical intervention. 17. Restrictions discussed with [facility] and he expresses understanding. 18. He is advised to call back directly if there are further questions/concerns, or if his symptoms worsen prior to surgery.     - Sobeida Townsend states that he has not been able to lose enough adequate excess body weight with medical management only and would like to proceed with a sleeve gastrectomy    Procedures/Diagnostic Tests:     OPERATIVE REPORT     PATIENT NAME: Seema Castro                      :        1975  MED REC NO:   [id number]                           ROOM:       0025  ACCOUNT NO:   [de-identified]                           ADMIT DATE: 2017  PROVIDER:     Krissy Werner M.D.     DATE OF PROCEDURE:  2017     PREOPERATIVE DIAGNOSES:  1.  Obesity. 2.  BMI of 36.  3.  Diabetes mellitus. 4.  Hypertension.     POSTOPERATIVE DIAGNOSES:  1.  Obesity. 2.  BMI of 36.  3.  Diabetes mellitus. 4.  Hypertension.     PROCEDURE:  Robotic sleeve gastrectomy.     SURGEON:  Krissy Werner M.D.     ASSISTANT:  Abimael Strong.  ATA Boyer.     ANESTHESIA:  General/local.     ESTIMATED BLOOD LOSS:  20 mL.     DRAINS:  None.     COMPLICATIONS:  None.     DISPOSITION:  Stable to the recovery room.     INDICATIONS:  The patient is a 46-year-old gentleman who had been seen in  the weight management program secondary to evaluation, there was no hollow viscus, solid organ, or major  vascular injury. An 8 mm trocar x4 was then placed in the standard  location under direct vision. A 5 mm trocar was then placed in the far  right lateral abdominal region under direct vision. Liver retractor was  then placed without difficulty. The 11 mm trocar was converted to a 15 mm  trocar for the stapler. The patient was placed in a steep reverse  Trendelenburg. Robot brought in and docked. Instruments were placed by  myself under direct vision. Once everything was aligned and in order, I  then unscrubbed and went back to the console. I began the operation first  by evaluating the hiatus. He had no significant hiatal hernia. The lesser  sac was then entered into with the vessel sealer along the greater  curvature through the mesentery. This was then unzipped distal to about  5-6 cm from the pylorus. It was then unzipped proximal up to and around  the fundus all the way to the hiatus. The fundus was completely mobilized  taking down all the short gastrics. Hemostasis was adequate. Spleen  preserved. A 36-Togolese bougie was then placed under direct vision. Once  this was aligned, I then fired a black reinforced 60 mm cartridge distally  there hugging the bougie. Care was taken to avoid any stricture/stenosis  there at the angle of the stomach. Next, several 60 reinforced purple  loads were fired hugging the bougie. The last one ensured that all the  excess fundus tissue was removed, but also not to encroach there at the GE  junction. The gastric remnant was then placed off to the left side. The  staple line was then tested under irrigation with the stomach being clamped  distally there at the pylorus and insufflation through the bougie, which  demonstrated no air bubbles or any kind of a leak. The irrigation was then  suctioned back out.   Evicel was then placed along the staple line to ensure  hemostasis and also give it some added support. The bougie was taken off  the suction after the air that was insufflated intraluminally was suctioned  out and then the bougie was removed itself. An 0-Ethibond suture was then  placed through and through the gastric remnant and brought up to the 15 mm  trocar site. The robot was then undocked after the instruments were  removed and I scrubbed back into the table. The gastric remnant was then  removed and sent to Pathology for permanent. Using a Storz suture passer,  I placed an 0 Vicryl suture through-and-through the fascia there at the  large trocar site. After completion of this, there were no fascial  defects. The patient tolerated the desufflation well. Hemostasis at the  fascial level was good. Skin was then re-approximated at all the  incisional sites with 4-0 Vicryl in a subcuticular fashion. The closed  incisions were then cleaned, dried, and Steri-Strips applied. A 0.5%  Marcaine with epinephrine was used for local anesthetic. The patient  tolerated the injection of local anesthetic without difficulty. Sponge,  needle, and instrumentation counts were correct at the end of the  procedure. The patient tolerated the procedure well with no apparent  complications and only about 10 to 20 mL of blood loss. He was able to be  brought out of general anesthesia and transferred to the postanesthesia  care unit in stable condition.        PROCEDURE: FL UGI       CLINICAL INFORMATION: s/p sleeve gastrectomy,  .       COMPARISON: No prior study.       TECHNIQUE: A preliminary upright image of the abdomen was done. This was followed by several swallows of Gastrografin and then then barium.       FINDINGS: The preliminary image demonstrates the nonspecific bowel gas pattern with possible developing ileus with multiple gas-filled mildly dilated loops in the left upper quadrant. Normal passage of the barium into the stomach. Delayed progression    into the small bowel.  Subsequent delayed hours.    WOUND/DRESSING INSTRUCTIONS:  Always ensure you and your care giver clean hands before and after caring for the wound. [] Keep dressing clean and dry for 48 hours. Change when soiled or wet. [x] Allow steri-strips to fall off on their own. [x] Ice operative site for 20 minutes 4 times a day as needed. [x] May wash over incision in shower, but do not soak in a bath.  [] Take sitz bath for 20 minutes twice daily and after bowel movements. [x] Keep the abdominal binder in place during the day. May remove to shower and at night. ABDOMINAL/LAPAROSCOPIC SURGERY  [x]You are encouraged to get up and move around as this helps with circulation, prevents blood clots from forming and speeds up the healing process. Call the office if you develop pain or swelling in your legs. Do not massage sore muscles in the legs. [x]Breath deeply and cough from time to time. This helps to clear your lungs and helps prevent pneumonia. [x]Supporting your incision with a pillow or your hand helps to minimize discomfort and pain. [x]Laparoscopic patients may develop shoulder pain in the first 48 hours from the gas used during the procedure a heating pad may help alleviate this discomfort. FOLLOW-UP CARE. SPECIFICALLY WATCH FOR:   Fever over 101 degrees by mouth   Increased redness, warmth, hardness at operative site. Blood soaked dressing (small amounts of oozing may be normal.)   Increased or progressive drainage from the surgical area   Inability to urinate or blood in the urine   Pain not relieved by the medications ordered   Persistent nausea and/or vomiting, unable to retain fluids. Pain or swelling in your legs. Shortness of breath. Call the office if you develop any of the above symptoms. FOLLOW-UP APPOINTMENT   [x]1 week: in weight management   []2 weeks:    []Other    Call my office if you have any problem that concerns you [phone].  After hours, you can reach the answering service via the

## 2017-12-13 NOTE — PROGRESS NOTES
Intravenous Q6H    scopolamine  1 patch Transdermal Q72H    famotidine (PEPCID) injection  20 mg Intravenous BID     Continuous Infusions:   dextrose 5 % and 0.9 % NaCl      sodium chloride 125 mL/hr at 17 0429     PRN Meds:.glucose, dextrose, glucagon (rDNA), dextrose 5 % and 0.9 % NaCl, diatrizoate meglumine-sodium, docusate sodium, oxyCODONE **OR** oxyCODONE, promethazine, sodium chloride flush, acetaminophen, morphine **OR** morphine, promethazine  OBJECTIVE   CURRENT VITALS:  height is 6' 1\" (1.854 m) and weight is 273 lb 9.6 oz (124.1 kg). His oral temperature is 97.9 °F (36.6 °C). His blood pressure is 178/90 (abnormal) and his pulse is 52. His respiration is 16 and oxygen saturation is 97%.    Temperature Range (24h):Temp: 97.9 °F (36.6 °C) Temp  Av °F (36.7 °C)  Min: 97.9 °F (36.6 °C)  Max: 98.1 °F (36.7 °C)  BP Range (64Q): Systolic (35YBW), VJF:581 , Min:138 , IUO:116     Diastolic (27XGN), NYY:40, Min:66, Max:92    Pulse Range (24h): Pulse  Av.2  Min: 50  Max: 62  Respiration Range (24h): Resp  Av.4  Min: 16  Max: 18  Current Pulse Ox (24h):  SpO2: 97 %  Pulse Ox Range (24h):  SpO2  Av.6 %  Min: 97 %  Max: 99 %  Oxygen Amount and Delivery: O2 Flow Rate (L/min): 4 L/min  Incentive Spirometry Tx:   Treatment Tolerance: Fair Incentive Spirometry Goal (mL): 2000 mL Incentive Spirometry Achieved (mL): 250 mL    GENERAL: alert, no distress  LUNGS: clear to ausculation, without wheezes, rales or rhonci  HEART: normal rate and regular rhythm  ABDOMEN: non-distended, soft, incisional tenderness, bowel sounds present in all 4 quadrants and no guarding or peritoneal signs  INCISION: healing well, no significant drainage, no significant erythema  EXTERMITY: no cyanosis, clubbing or edema  In: 3934.8 [P.O.:460; I.V.:3474.8]  Out: -     Date 17 - 17   Shift 6909-6620 7797-5140 9593-8245 24 Hour Total   I  N  T  A  K  E   P.O. 130 150  280    I.V.  (mL/kg/hr)   (2)

## 2017-12-14 NOTE — TELEPHONE ENCOUNTER
[facility] Transitions Initial Follow Up Call    Call within 2 business days of discharge: yes    Patient: Timothy Grant Parent Patient : 1975   MRN: [id number]  Reason for Admission: There are no discharge diagnoses documented for the most recent discharge. Discharge Date: 17 RARS: Ricardo CLINTON([id number])@     Spoke with: JAREDM to [place]: [unfilled]    Non-face-to-face services provided:       Follow Up  Future Appointments  Date Time Provider January Hough   2017 9:15 AM JAIMIE Rudolph [facility] [place]   2017 2:00 PM Jakob Khan DO [facility] [place]   2017 3:00 PM Yulia Hood MD [facility] [place]   2017 10:00 AM Melida Mckeon RD, LD [facility] [place]   3/5/2018 5:00 PM Jakob Khan, [id number] [place] [address]

## 2017-12-19 ENCOUNTER — OFFICE VISIT (OUTPATIENT)
Dept: BARIATRICS/WEIGHT MGMT | Age: 42
End: 2017-12-19

## 2017-12-19 ENCOUNTER — PATIENT MESSAGE (OUTPATIENT)
Dept: FAMILY MEDICINE CLINIC | Age: 42
End: 2017-12-19

## 2017-12-19 VITALS
TEMPERATURE: 97.9 F | HEART RATE: 52 BPM | BODY MASS INDEX: 33.78 KG/M2 | WEIGHT: 263.2 LBS | HEIGHT: 74 IN | SYSTOLIC BLOOD PRESSURE: 113 MMHG | DIASTOLIC BLOOD PRESSURE: 73 MMHG

## 2017-12-19 DIAGNOSIS — Z98.84 S/P LAPAROSCOPIC SLEEVE GASTRECTOMY: ICD-10-CM

## 2017-12-19 DIAGNOSIS — F32.A DEPRESSION, UNSPECIFIED DEPRESSION TYPE: ICD-10-CM

## 2017-12-19 DIAGNOSIS — E66.9 OBESITY, CLASS I, BMI 30-34.9: Primary | ICD-10-CM

## 2017-12-19 DIAGNOSIS — E11.9 CONTROLLED TYPE 2 DIABETES MELLITUS WITHOUT COMPLICATION, WITHOUT LONG-TERM CURRENT USE OF INSULIN (HCC): ICD-10-CM

## 2017-12-19 PROCEDURE — 99024 POSTOP FOLLOW-UP VISIT: CPT | Performed by: PHYSICIAN ASSISTANT

## 2017-12-19 NOTE — PROGRESS NOTES
[facility] PROFESSIONAL SERVS  [facility]  [address], [address]  [address] [address]  Dept: [phone]  Loc: [phone]      Visit Date:  12/19/2017  Weight Management Postop Follow-up    HPI:      Jordon Ruvalcaba Parent is a 38 y.o. male who is here today for 1 weeks follow up since  robotic-assisted sleeve gastrectomy performed by Dr. Reina Bills  on 12/11/17. Pt reports that he is doing well. Pt reports that he is drinking 64 oz of fluid and 62-93 grams of protein daily. Tolerating post-op diet. No issue with bowel movements. No nausea or emesis. Denies heartburn/ GERD - continues to take PPI. Denies sx of dehydration. Denies CP/SOB. No Abdominal pain. Minimal incisional discomfort. Taking and tolerating all vitamins. Off all pain meds. Taking Lovenox daily, as rx. Total weight loss since surgery is 21 pounds. Depression controlled with medication. Has not taken any DM meds since surgery. Checking BS 2-3 times daily. Running 110-140. Physical Activity:  walking  Days per week: daily  Time per session: 30 minutes    Current BMI: Body mass index is 34.25 kg/m². Current Weight:   Wt Readings from Last 3 Encounters:   12/19/17 263 lb 3.2 oz (119.4 kg)   12/11/17 273 lb 9.6 oz (124.1 kg)   12/04/17 281 lb 12.8 oz (127.8 kg)     Pre-op Body Weight: 284      Past Medical History:  Past Medical History:   Diagnosis Date    Chronic back pain     Diabetes mellitus ([address]     Hyperlipidemia     Hypertension        Past Surgical History:  Past Surgical History:   Procedure Laterality Date    ENDOSCOPY, COLON, DIAGNOSTIC      LYMPHADENECTOMY      1978    SLEEVE GASTRECTOMY N/A 12/11/2017    ROBOTIC GASTRECTOMY SLEEVE performed by Sri Zuleta MD at [facility] EXTRACTION         Past Social History:  Social History     Social History    Marital status:      Spouse name: N/A    Number of children: N/A    Years of education: N/A     Occupational History    Not on file. Social History Main Topics    Smoking status: Former Smoker     Quit date: 2011    Smokeless tobacco: Former User     Types: Chew    Alcohol use No    Drug use: No    Sexual activity: Not on file     Other Topics Concern    Not on file     Social History Narrative    No narrative on file        Medications:   Current Outpatient Prescriptions   Medication Sig Dispense Refill    enoxaparin (LOVENOX) 40 MG/0.4ML injection Inject 0.4 mLs into the skin daily for 14 days 14 Syringe 0    Docusate Sodium 100 MG TABS Take 100 mg by mouth 2 times daily Take as needed to prevent constipation 30 tablet 1    omeprazole (PRILOSEC) 40 MG delayed release capsule Take 1 capsule by mouth daily Open capsule and take in liquid 30 capsule 2    Multiple Vitamin (MULTI VITAMIN) TABS Take 1 tablet by mouth daily 30 tablet 5    Cholecalciferol (VITAMIN D3) 1000 units CAPS Take 1 tablet by mouth daily 30 capsule 5    FLUoxetine (PROZAC) 20 MG capsule Take 1 capsule by mouth daily 30 capsule 5    oxyCODONE (ROXICODONE) 5 MG immediate release tablet Take 1 tablet by mouth every 6 hours as needed for Pain . 25 tablet 0    ketorolac (TORADOL) 10 MG tablet Take 1 tablet by mouth every 8 hours as needed for Pain 20 tablet 0    metoclopramide (REGLAN) 10 MG tablet Take 1 tablet by mouth every 6 hours as needed (nausea/vomiting) 30 tablet 0    ondansetron (ZOFRAN) 4 MG tablet Take 1 tablet by mouth every 4 hours as needed for Nausea or Vomiting 30 tablet 0     No current facility-administered medications for this visit. Allergies:   No Known Allergies    Subjective:    Constitutional: Denies any fever, chills, fatigue. Wound: Denies any rash, skin color changes or wound problems. Resp: Denies any cough, shortness of breath. CV: Denies any chest pain, orthopnea or syncope. MS: Denies myalgias, arthralgias. GI: Denies any nausea, vomiting, diarrhea, constipation, melena, hematochezia.   Minimal incisional discomfort. : Denies any hematuria, hesitancy or dysuria. NEURO: Denies seizures, headache. Objective:    /73 (Site: Right Arm, Position: Sitting, Cuff Size: Large Adult)   Pulse 52   Temp 97.9 °F (36.6 °C) (Oral)   Ht 6' 1.5\" (1.867 m)   Wt 263 lb 3.2 oz (119.4 kg)   BMI 34.25 kg/m²     Physical Examination:   Constitutional: Alert and oriented to person, place and time. Well-developed, well- nourished. Head: Normocephalic and atraumatic  Neck: Supple. Eyes: EOMI b/l. Conjunctivae normal.  No scleral icterus. Respiratory: Effort normal. No respiratory distress. Abd: Incisions are healing nicely. Steri-strips intact. . Minimal-tenderness. No sign of infection. Ext:  Movement x 4. No edema  Skin; Warm and dry, no visible rashes, lesions or ulcers.    Neuro: Cranial Nerves Grossly Intact; nml coordination    Labs:  CBC   Lab Results   Component Value Date    WBC 8.1 10/30/2017    RBC 5.05 10/30/2017    HGB 15.2 12/12/2017    HCT 43.3 12/12/2017    MCV 91.4 10/30/2017    MCH 31.9 10/30/2017    MCHC 34.9 10/30/2017    RDW 12.7 10/30/2017     10/30/2017    MPV 9.6 10/30/2017    RBCMORP NORMAL 05/01/2017    SEGSPCT 44.5 10/30/2017    LABLYMP 46.3 10/30/2017    MONOPCT 8.0 10/30/2017    LABEOS 0.9 10/30/2017    BASO 0.3 10/30/2017    NRBC 0 10/30/2017    SEGSABS 3.6 10/30/2017    LYMPHSABS 3.8 10/30/2017    MONOSABS 0.6 10/30/2017    EOSABS 0.1 10/30/2017    BASOSABS 0.0 10/30/2017        BMP/CMP   Lab Results   Component Value Date    GLUCOSE 247 12/12/2017    GLUCOSE 132 07/15/2011    CREATININE 0.9 12/12/2017    BUN 17 12/12/2017     12/12/2017    K 4.5 12/12/2017    CL 99 12/12/2017    CO2 22 12/12/2017    CALCIUM 8.8 12/12/2017    AST 34 05/01/2017    ALKPHOS 58 05/01/2017    PROT 7.5 05/01/2017    LABALBU 4.5 05/01/2017    LABALBU 4.6 07/15/2011    BILITOT 0.6 05/01/2017    ALT 65 05/01/2017        PREALBUMIN   Lab Results   Component Value Date    PREALBUMIN 31.5 05/01/2017 VITAMIN B12   Lab Results   Component Value Date    RAQMLWSW97 927 05/01/2017        24 HOUR URINE CALCIUM   No results found for: JASMIN GarciaUR     VITAMIN D   Lab Results   Component Value Date    VITD25 26 09/18/2017        VITAMIN B1/ THIAMINE   Lab Results   Component Value Date    EKVZ4QDEXTW 78 05/01/2017        RBC FOLATE   Lab Results   Component Value Date    FOLATE 13.0 05/01/2017        LIPID SCREEN (FASTING)   Lab Results   Component Value Date    CHOL 228 05/01/2017    TRIG 192 05/01/2017    HDL 40 05/01/2017    LDLCALC 150 05/01/2017   ,     HGA1C (T2DM ONLY)   Lab Results   Component Value Date    LABA1C 7.9 12/04/2017    AVGG 186 10/30/2017        TSH   Lab Results   Component Value Date    TSH 1.600 05/01/2017        IRON   Lab Results   Component Value Date    IRON 90 05/01/2017        IONIZED CALCIUM   No results found for: BRIDGETTE PETER      Assessment:      1. Obesity, Class I, BMI 30-34.9     2. S/P laparoscopic sleeve gastrectomy     3. Depression, unspecified depression type     4. Controlled type 2 diabetes mellitus without complication, without long-term current use of insulin ([address]       Plan:    Stay well hydrated. Drink a minimum of 64 oz of non-carbonated, non-caffeinated fluids daily. Nutritional education occurred during visit. Tolerating diet. Continue following  with dietitian and follow their recommendations as  directed. Continue  60-80 grams of protein each day. Signs and symptoms reviewed with patient that would be concerning and need her to return to office for re-evaluation. Patient will call if any questions or concerns arrise. No lifting, pushing, pulling over 20#  No abdominal exercises  Wear abdominal binder prn  Complete Lovenox as rx  Importance of physical activity discussed with patient. Advised to increase activity as tolerated. Continue taking Multivitamin, Calcium and B12 as directed  Continue PPI for at least 3 months post-op.   Encouraged to attend support groups  May start soft foods over weekend- yogurt, cottage cheese, mashed potatoes, applesauce   Follow-up in 1 week with provider and dietitian  Return in about 1 week (around 12/26/2017) for postop follow up.        Electronically signed by JAIMIE Hong on 12/19/2017 at 9:40 AM

## 2017-12-19 NOTE — TELEPHONE ENCOUNTER
[facility] Transitions Initial Follow Up Call    Call within 2 business days of discharge: Yes    Patient: Timothy Grant Parent Patient : 1975   MRN: [id number]  Reason for Admission: There are no discharge diagnoses documented for the most recent discharge. Discharge Date: 17 RARS: Ricardo CLINTON([phone])@     Spoke with: [address]: [unfilled]    Non-face-to-face services provided:  Confirmed f/u appt       Have the medications prescribed at discharge been filled?  Yes    Has the patient experienced any new symptoms or have previous symptoms worsened? no    Is the patient experiencing any pain? n0 If yes, is the pain well controlled? n/a      Follow Up  Future Appointments  Date Time Provider January Hough   2017 2:00 PM Judd Foster[address] [place] [address]   2017 10:00 AM Melida Mckeon RD, LD [facility] [place]   1/3/2018 10:30 AM JAIMIE Rudolph [facility] [place]   3/5/2018 5:00 PM Jakob Khan [address] [place] [address]       Jasmin Pedersen LPN

## 2017-12-21 ENCOUNTER — OFFICE VISIT (OUTPATIENT)
Dept: FAMILY MEDICINE CLINIC | Age: 42
End: 2017-12-21
Payer: COMMERCIAL

## 2017-12-21 VITALS
DIASTOLIC BLOOD PRESSURE: 82 MMHG | HEIGHT: 73 IN | TEMPERATURE: 98 F | BODY MASS INDEX: 34.59 KG/M2 | HEART RATE: 72 BPM | RESPIRATION RATE: 14 BRPM | SYSTOLIC BLOOD PRESSURE: 128 MMHG | WEIGHT: 261 LBS

## 2017-12-21 DIAGNOSIS — I10 ESSENTIAL HYPERTENSION: ICD-10-CM

## 2017-12-21 DIAGNOSIS — Z98.84 S/P BARIATRIC SURGERY: ICD-10-CM

## 2017-12-21 DIAGNOSIS — E66.09 CLASS 1 OBESITY DUE TO EXCESS CALORIES WITH SERIOUS COMORBIDITY AND BODY MASS INDEX (BMI) OF 34.0 TO 34.9 IN ADULT: Primary | ICD-10-CM

## 2017-12-21 PROCEDURE — 1111F DSCHRG MED/CURRENT MED MERGE: CPT | Performed by: FAMILY MEDICINE

## 2017-12-21 PROCEDURE — 99495 TRANSJ CARE MGMT MOD F2F 14D: CPT | Performed by: FAMILY MEDICINE

## 2017-12-21 NOTE — PROGRESS NOTES
Have you changed or started any medications since your last visit including any over-the-counter medicines, vitamins, or herbal medicines? no   Are you having any side effects from any of your medications? -  no  Have you stopped taking any of your medications? Is so, why? -  no    Have you seen any other physician or provider since your last visit? Yes - Records Obtained  Have you had any other diagnostic tests since your last visit? Yes - Records Obtained  Have you been seen in the emergency room and/or had an admission to a hospital since we last saw you? Yes - Records Obtained  Have you had your routine dental cleaning in the past 6 months? no    Have you activated your [employer] account? If not, what are your barriers? Yes     Patient Care Team:  Mark Garland DO as PCP - General (Family Medicine)    Medical History Review  Past Medical, Family, and Social History     Defer to provider.
family history and I have made updates where appropriate. PHYSICAL EXAM:  Vitals:    12/21/17 1357   BP: 128/82   Pulse: 72   Resp: 14   Temp: 98 °F (36.7 °C)     General Appearance: well developed and well- nourished, in no acute distress  Head: normocephalic and atraumatic  ENT: external ear and ear canal normal bilaterally, nose without deformity, nasal mucosa and turbinates normal without polyps, oropharynx normal, dentition is normal for age, no lip or gum lesions noted  Neck: supple and non-tender without mass, no thyromegaly or thyroid nodules, no cervical lymphadenopathy  Pulmonary/Chest: clear to auscultation bilaterally- no wheezes, rales or rhonchi, normal air movement, no respiratory distress or retractions  Cardiovascular: normal rate, regular rhythm, normal S1 and S2, no murmurs, rubs, clicks, or gallops, distal pulses intact  Abdomen: soft, non-tender, non-distended, no rebound or guarding, no masses or hernias noted, no hepatospleenomegaly  Extremities: no cyanosis, clubbing or edema of the lower extremities  Psych:  Normal affect without evidence of depression or anxiety, insight and judgement are appropriate, memory appears intact  Skin: warm and dry, no rash or erythema      Diagnostic test results reviewed: inpatient labs    Patient risk of morbidity and mortality: moderate      ASSESSMENT & PLAN  Vaughn Chen was seen today for follow-up from hospital and other. Diagnoses and all orders for this visit:    Class 1 obesity due to excess calories with serious comorbidity and body mass index (BMI) of 34.0 to 34.9 in adult  -     NC DISCHARGE MEDS RECONCILED W/ CURRENT OUTPATIENT MED LIST    -Overall is doing really well  -Follow up with bariatric team as scheduled  -Advsied to call if any issues    Return if symptoms worsen or fail to improve.     Level of medical complexity:  moderate      Humphrey received counseling on the following healthy behaviors: medication adherence  Reviewed prior labs and

## 2017-12-26 ENCOUNTER — OFFICE VISIT (OUTPATIENT)
Dept: BARIATRICS/WEIGHT MGMT | Age: 42
End: 2017-12-26

## 2017-12-26 VITALS — WEIGHT: 255.6 LBS | HEIGHT: 74 IN | BODY MASS INDEX: 32.8 KG/M2

## 2017-12-26 DIAGNOSIS — Z98.84 STATUS POST LAPAROSCOPIC SLEEVE GASTRECTOMY: Primary | ICD-10-CM

## 2017-12-26 NOTE — PATIENT INSTRUCTIONS
1. Begin to set aside three meal times per day about 1/4 cup portion size. Important to have set meal times so can still focus on adequate fluid intake between meals and get your protein in.  2.  Now is when you want to separate your liquids from solids. No liquids 30 minutes before your meals and no liquids 30 minutes after your meals. Water goal at least 64 oz per day  3. Continue to drink protein shakes between meals as can not get enough protein from food alone at this time. Goal is 60-80 grams protein per day. 4.  Continue taking bariatric vitamins as currently taking. Make sure take the Multivitamin after eat food. Get  your six week post op lab work completed 5-7 days prior to next office visit.

## 2017-12-26 NOTE — PROGRESS NOTES
Patient is a 38 y.o. male seen for follow up MNT visit for two week post op. Vitals from current and previous visits:  Vitals 01/40/4039   SYSTOLIC    DIASTOLIC    Site    Position    Cuff Size    Pulse    Temp    Resp    Weight 255 lb 9.6 oz   Height 6' 1.5\"   BMI (wt*703/ht~2) 33.26 kg/m2        Recent Post Op Lab Work:    Lab Results   Component Value Date    VITD25 26 09/18/2017       Date of Surgery: 12/13/17  Type of Surgery: gastric sleeve     Body mass index is 33.27 kg/m². Obesity Classification: Class II    Initial Weight: 284.6 lbs at pre-op teaching class   Excess  Body Weight Pre-Op: 90.6  lbs    Weight Loss: 29 lbs. Patient's Target Weight =  194 lbs for a BMI of ~25  Percentage of Excess Body Weight Lost to date is :  32 %      Checking glucose this am was 125. Checking 3-4 times a day. Average of ~ 128. Is not on any DM medication or insulin since surgery. Went to PCP last week and is continue to monitor glucose levels right now. How pleased are you with your current weight loss: Pt is pleased with weight loss  Are you experiencing any physical problems post surgery: Yes- some nausea after takes MVI. States bowels are moving      Protein intake: 60-80 grams/day   Patient taking protein supplement: Yes. Brand of Supplement: Nectar most of the time, Premier Protein Shake BID mixed with 1% milk    Fluid intake: >64 oz/day    Multivitamin/mineral intake: Celebrate Multivatimin chewable - how many/day: BID. Some nausea after MVI but pt noticed took this on empty stomach. Advised pt to take this after eats foods instead. Calcium intake: Yes. Calcium Celebrate chewable 500 mg TID  Other: B12 daily  Does patient exercise: walking     Assessment  Pt tolerating liquids and and protein shakes without difficulty. Taking bariatric vitamins as recommended. Plan  Plan/Recommendations: Educated pt on two week post op nutrition guidelines. Pt encouraged to referenced education manual as needed. Additional instructions below. Patient Instructions   1. Begin to set aside three meal times per day about 1/4 cup portion size. Important to have set meal times so can still focus on adequate fluid intake between meals and get your protein in.  2.  Now is when you want to separate your liquids from solids. No liquids 30 minutes before your meals and no liquids 30 minutes after your meals. Water goal at least 64 oz per day  3. Continue to drink protein shakes between meals as can not get enough protein from food alone at this time. Goal is 60-80 grams protein per day. 4.  Continue taking bariatric vitamins as currently taking. Get  your six week post op lab work completed 5-7 days prior to next office visit.         Recommended Follow-Up: six week post op with RD and JAIMIE Alexandra, RD, LD  Dietitian- [facility]

## 2018-01-02 RX ORDER — FLUOXETINE HYDROCHLORIDE 20 MG/1
20 CAPSULE ORAL DAILY
Qty: 30 CAPSULE | Refills: 5 | Status: SHIPPED | OUTPATIENT
Start: 2018-01-02 | End: 2018-02-01 | Stop reason: SDUPTHER

## 2018-01-02 NOTE — TELEPHONE ENCOUNTER
From: Odell Akins Parent  Sent: 12/31/2017 5:03 PM EST  Subject: Medication Renewal Request    Odell Akins. Parent would like a refill of the following medications:  FLUoxetine (PROZAC) 20 MG capsule Jada Hernandez DO]    [facility] [place] [address]    Comment:

## 2018-01-03 ENCOUNTER — OFFICE VISIT (OUTPATIENT)
Dept: BARIATRICS/WEIGHT MGMT | Age: 43
End: 2018-01-03

## 2018-01-03 VITALS
SYSTOLIC BLOOD PRESSURE: 100 MMHG | RESPIRATION RATE: 18 BRPM | BODY MASS INDEX: 32.16 KG/M2 | DIASTOLIC BLOOD PRESSURE: 62 MMHG | HEIGHT: 74 IN | WEIGHT: 250.6 LBS | HEART RATE: 56 BPM | TEMPERATURE: 97.6 F

## 2018-01-03 DIAGNOSIS — Z98.84 S/P LAPAROSCOPIC SLEEVE GASTRECTOMY: ICD-10-CM

## 2018-01-03 DIAGNOSIS — E66.9 DIABETES MELLITUS TYPE 2 IN OBESE (HCC): ICD-10-CM

## 2018-01-03 DIAGNOSIS — E66.9 OBESITY (BMI 30.0-34.9): Primary | ICD-10-CM

## 2018-01-03 DIAGNOSIS — Z13.21 MALNUTRITION SCREEN: ICD-10-CM

## 2018-01-03 DIAGNOSIS — E11.69 DIABETES MELLITUS TYPE 2 IN OBESE (HCC): ICD-10-CM

## 2018-01-03 DIAGNOSIS — K91.2 POSTOPERATIVE INTESTINAL MALABSORPTION: ICD-10-CM

## 2018-01-03 PROCEDURE — 99024 POSTOP FOLLOW-UP VISIT: CPT | Performed by: PHYSICIAN ASSISTANT

## 2018-01-03 NOTE — PROGRESS NOTES
Smoker     Quit date: 2011    Smokeless tobacco: Former User     Types: Chew    Alcohol use No    Drug use: No    Sexual activity: Not on file     Other Topics Concern    Not on file     Social History Narrative    No narrative on file        Medications:   Current Outpatient Prescriptions   Medication Sig Dispense Refill    Calcium Citrate-Vitamin D (CALCIUM + VIT D, BARIATRIC ADVANTAGE, CHEWABLE TABLET) Take 1 tablet by mouth 3 times daily      Cyanocobalamin (VITAMIN B-12 SL) Place 1 tablet under the tongue daily      FLUoxetine (PROZAC) 20 MG capsule Take 1 capsule by mouth daily 30 capsule 5    omeprazole (PRILOSEC) 40 MG delayed release capsule Take 1 capsule by mouth daily Open capsule and take in liquid 30 capsule 2    Multiple Vitamin (MULTI VITAMIN) TABS Take 1 tablet by mouth daily 30 tablet 5     No current facility-administered medications for this visit. Allergies:   No Known Allergies    Subjective:    Constitutional: Denies any fever, chills, fatigue. Wound: Denies any rash, skin color changes or wound problems. Resp: Denies any cough, shortness of breath. CV: Denies any chest pain, orthopnea or syncope. MS: Denies myalgias, arthralgias. GI: Denies any nausea, vomiting, diarrhea, constipation, melena, hematochezia. No incisional discomfort. : Denies any hematuria, hesitancy or dysuria. NEURO: Denies seizures, headache. Objective:    /62 (Site: Right Arm, Position: Sitting, Cuff Size: Large Adult)   Pulse 56   Temp 97.6 °F (36.4 °C) (Oral)   Resp 18   Ht 6' 1.5\" (1.867 m)   Wt 250 lb 9.6 oz (113.7 kg)   BMI 32.61 kg/m²     Physical Examination:   Constitutional: Alert and oriented to person, place and time. Well-developed, well- nourished. Head: Normocephalic and atraumatic  Neck: Supple. Eyes: EOMI b/l. Conjunctivae normal.  No scleral icterus. Respiratory: Effort normal. No respiratory distress. Abd: Incisions well healed. No sign of infection. 10/30/2017        TSH   Lab Results   Component Value Date    TSH 1.600 05/01/2017        IRON   Lab Results   Component Value Date    IRON 90 05/01/2017        IONIZED CALCIUM   No results found for: BRIDGETTE PETER      Assessment:      1. Obesity (BMI 30.0-34. 9)  CBC Auto Differential    Comprehensive Metabolic Panel    Prealbumin   2. S/P laparoscopic sleeve gastrectomy  CBC Auto Differential    Comprehensive Metabolic Panel    Prealbumin   3. Malnutrition screen  CBC Auto Differential    Comprehensive Metabolic Panel    Prealbumin   4. Postoperative intestinal malabsorption  CBC Auto Differential    Comprehensive Metabolic Panel    Prealbumin   5. Diabetes mellitus type 2 in obese [facility])       Plan:    Stay well hydrated. Drink a minimum of 64 oz of non-carbonated, non-caffeinated fluids daily. Nutritional education occurred during visit. Tolerating diet. Continue following with dietitian and follow their recommendations as directed. Continue  60-80 grams of protein each day. Signs and symptoms reviewed with patient that would be concerning and need her to return to office for re-evaluation. Patient will call if any questions or concerns arrise. No lifting, pushing, pulling over 20#  No abdominal exercises  Importance of physical activity discussed with patient. Increase physical activity as tolerated  Continue taking Multivitamin, Calcium and B12 as directed  Continue PPI for at least 3 months post-op. Encouraged to attend support groups  Progress diet as tolerated per dietitian recommendations. 6 week labs ordered- to be drawn one week prior to next apt. Continue monitoring Blood sugars. Return in about 3 weeks (around 1/24/2018) for postop follow up.        Electronically signed by JAIMIE Mariscal on 1/3/2018 at 10:53 AM

## 2018-01-15 ENCOUNTER — HOSPITAL ENCOUNTER (OUTPATIENT)
Age: 43
Discharge: HOME | End: 2018-01-15
Payer: COMMERCIAL

## 2018-01-15 DIAGNOSIS — E55.9 VITAMIN D DEFICIENCY: ICD-10-CM

## 2018-01-15 DIAGNOSIS — Z13.21 MALNUTRITION SCREEN: ICD-10-CM

## 2018-01-15 DIAGNOSIS — E66.9 OBESITY (BMI 30.0-34.9): ICD-10-CM

## 2018-01-15 DIAGNOSIS — K91.2 POSTOPERATIVE INTESTINAL MALABSORPTION: ICD-10-CM

## 2018-01-15 DIAGNOSIS — Z98.84 S/P LAPAROSCOPIC SLEEVE GASTRECTOMY: ICD-10-CM

## 2018-01-15 LAB
ALBUMIN SERPL-MCNC: 4.5 G/DL (ref 3.5–5.1)
ALP BLD-CCNC: 56 U/L (ref 38–126)
ALT SERPL-CCNC: 31 U/L (ref 11–66)
ANION GAP SERPL CALCULATED.3IONS-SCNC: 16 MEQ/L (ref 8–16)
AST SERPL-CCNC: 24 U/L (ref 5–40)
BASOPHILS # BLD: 0.5 %
BASOPHILS ABSOLUTE: 0 THOU/MM3 (ref 0–0.1)
BILIRUB SERPL-MCNC: 0.6 MG/DL (ref 0.3–1.2)
BUN BLDV-MCNC: 18 MG/DL (ref 7–22)
CALCIUM SERPL-MCNC: 9.7 MG/DL (ref 8.5–10.5)
CHLORIDE BLD-SCNC: 103 MEQ/L (ref 98–111)
CO2: 25 MEQ/L (ref 23–33)
CREAT SERPL-MCNC: 0.8 MG/DL (ref 0.4–1.2)
EOSINOPHIL # BLD: 1.1 %
EOSINOPHILS ABSOLUTE: 0 THOU/MM3 (ref 0–0.4)
GFR SERPL CREATININE-BSD FRML MDRD: > 90 ML/MIN/1.73M2
GLUCOSE BLD-MCNC: 90 MG/DL (ref 70–108)
HCT VFR BLD CALC: 41.6 % (ref 42–52)
HEMOGLOBIN: 14 GM/DL (ref 14–18)
LYMPHOCYTES # BLD: 43.3 %
LYMPHOCYTES ABSOLUTE: 1.9 THOU/MM3 (ref 1–4.8)
MCH RBC QN AUTO: 30.8 PG (ref 27–31)
MCHC RBC AUTO-ENTMCNC: 33.6 GM/DL (ref 33–37)
MCV RBC AUTO: 91.8 FL (ref 80–94)
MONOCYTES # BLD: 10.1 %
MONOCYTES ABSOLUTE: 0.4 THOU/MM3 (ref 0.4–1.3)
NUCLEATED RED BLOOD CELLS: 0 /100 WBC
PDW BLD-RTO: 13.2 % (ref 11.5–14.5)
PLATELET # BLD: 126 THOU/MM3 (ref 130–400)
PMV BLD AUTO: 10.8 MCM (ref 7.4–10.4)
POTASSIUM SERPL-SCNC: 4.2 MEQ/L (ref 3.5–5.2)
RBC # BLD: 4.53 MILL/MM3 (ref 4.7–6.1)
SEG NEUTROPHILS: 45 %
SEGMENTED NEUTROPHILS ABSOLUTE COUNT: 1.9 THOU/MM3 (ref 1.8–7.7)
SODIUM BLD-SCNC: 144 MEQ/L (ref 135–145)
TOTAL PROTEIN: 6.7 G/DL (ref 6.1–8)
VITAMIN D 25-HYDROXY: 32 NG/ML (ref 30–100)
WBC # BLD: 4.3 THOU/MM3 (ref 4.8–10.8)

## 2018-01-15 PROCEDURE — 82306 VITAMIN D 25 HYDROXY: CPT

## 2018-01-15 PROCEDURE — 85025 COMPLETE CBC W/AUTO DIFF WBC: CPT

## 2018-01-15 PROCEDURE — 80053 COMPREHEN METABOLIC PANEL: CPT

## 2018-01-15 PROCEDURE — 84134 ASSAY OF PREALBUMIN: CPT

## 2018-01-15 PROCEDURE — 36415 COLL VENOUS BLD VENIPUNCTURE: CPT

## 2018-01-16 LAB — PREALBUMIN: 19.3 MG/DL (ref 20–40)

## 2018-01-22 ENCOUNTER — OFFICE VISIT (OUTPATIENT)
Dept: BARIATRICS/WEIGHT MGMT | Age: 43
End: 2018-01-22

## 2018-01-22 VITALS — HEIGHT: 74 IN | BODY MASS INDEX: 30.6 KG/M2 | WEIGHT: 238.4 LBS

## 2018-01-22 DIAGNOSIS — Z98.84 S/P BARIATRIC SURGERY: Primary | ICD-10-CM

## 2018-01-25 ENCOUNTER — OFFICE VISIT (OUTPATIENT)
Dept: BARIATRICS/WEIGHT MGMT | Age: 43
End: 2018-01-25

## 2018-01-25 VITALS
HEIGHT: 74 IN | BODY MASS INDEX: 30.52 KG/M2 | TEMPERATURE: 97.9 F | DIASTOLIC BLOOD PRESSURE: 77 MMHG | RESPIRATION RATE: 18 BRPM | WEIGHT: 237.8 LBS | HEART RATE: 50 BPM | SYSTOLIC BLOOD PRESSURE: 128 MMHG

## 2018-01-25 DIAGNOSIS — F32.A DEPRESSION, UNSPECIFIED DEPRESSION TYPE: ICD-10-CM

## 2018-01-25 DIAGNOSIS — E11.9 CONTROLLED TYPE 2 DIABETES MELLITUS WITHOUT COMPLICATION, WITHOUT LONG-TERM CURRENT USE OF INSULIN (HCC): ICD-10-CM

## 2018-01-25 DIAGNOSIS — E55.9 VITAMIN D DEFICIENCY: ICD-10-CM

## 2018-01-25 DIAGNOSIS — E66.9 OBESITY (BMI 30.0-34.9): Primary | ICD-10-CM

## 2018-01-25 DIAGNOSIS — Z98.84 S/P LAPAROSCOPIC SLEEVE GASTRECTOMY: ICD-10-CM

## 2018-01-25 DIAGNOSIS — K91.2 POSTOPERATIVE INTESTINAL MALABSORPTION: ICD-10-CM

## 2018-01-25 PROCEDURE — 99024 POSTOP FOLLOW-UP VISIT: CPT | Performed by: PHYSICIAN ASSISTANT

## 2018-02-01 RX ORDER — FLUOXETINE HYDROCHLORIDE 20 MG/1
20 CAPSULE ORAL DAILY
Qty: 30 CAPSULE | Refills: 5 | Status: SHIPPED | OUTPATIENT
Start: 2018-02-01 | End: 2018-07-28 | Stop reason: SDUPTHER

## 2018-02-01 NOTE — TELEPHONE ENCOUNTER
From: Nava Alexander Parent  Sent: 1/31/2018 8:39 PM EST  Subject: Medication Renewal Request    Nava Alexander. Parent would like a refill of the following medications:  FLUoxetine (PROZAC) 20 MG capsule JAIME Chawla    Preferred pharmacy: [facility], [address] [place] [address]    Comment:

## 2018-03-01 ENCOUNTER — OFFICE VISIT (OUTPATIENT)
Dept: BARIATRICS/WEIGHT MGMT | Age: 43
End: 2018-03-01

## 2018-03-01 VITALS
BODY MASS INDEX: 28.88 KG/M2 | HEART RATE: 60 BPM | HEIGHT: 74 IN | DIASTOLIC BLOOD PRESSURE: 72 MMHG | WEIGHT: 225 LBS | TEMPERATURE: 97.8 F | SYSTOLIC BLOOD PRESSURE: 108 MMHG

## 2018-03-01 DIAGNOSIS — F32.A DEPRESSION, UNSPECIFIED DEPRESSION TYPE: ICD-10-CM

## 2018-03-01 DIAGNOSIS — E66.3 OVERWEIGHT (BMI 25.0-29.9): ICD-10-CM

## 2018-03-01 DIAGNOSIS — Z13.21 SCREENING FOR MALNUTRITION: ICD-10-CM

## 2018-03-01 DIAGNOSIS — E55.9 VITAMIN D DEFICIENCY: ICD-10-CM

## 2018-03-01 DIAGNOSIS — Z98.84 S/P BARIATRIC SURGERY: Primary | ICD-10-CM

## 2018-03-01 DIAGNOSIS — K91.2 POSTOPERATIVE INTESTINAL MALABSORPTION: ICD-10-CM

## 2018-03-01 DIAGNOSIS — Z98.84 S/P LAPAROSCOPIC SLEEVE GASTRECTOMY: Primary | ICD-10-CM

## 2018-03-01 PROCEDURE — 99024 POSTOP FOLLOW-UP VISIT: CPT | Performed by: PHYSICIAN ASSISTANT

## 2018-03-01 NOTE — PROGRESS NOTES
Patient is a 38 y.o. male seen for follow up MNT visit for three month post op. Vitals from current and previous visits:  Vitals 7/8/3443   SYSTOLIC 755   DIASTOLIC 72   Site Left Arm   Position Sitting   Cuff Size Large Adult   Pulse 60   Temp 97.8   Resp    Weight 225 lb   Height 6' 1.5\"   BMI (wt*703/ht~2) 29.28 kg/m2        Recent Post Op Lab Work:    Three month post op labs n/a and will plan to get these done soon  Lab Results   Component Value Date    VITD25 32 01/15/2018       Date of Surgery: 12/13/18  Type of Surgery: gastric sleeve    Initial Weight: 284.6 lbs at pre-op teaching class   Excess  Body Weight Pre-Op: 90.6  lbs     Weight Loss: 59.6 lbs. Patient's Target Weight =  194 lbs for a BMI of ~25  Percentage of Excess Body Weight Lost to date is :  65.7 %    How pleased are you with your current weight loss: Pt is pleased with weight loss      Are you experiencing any physical problems post surgery: No    Are you experiencing any dietary problems post surgery: No  Food Intolerances: Denies any food intolerances since last seen. How frequently are you eating? Eating three times a day  How long does it take you to finish a meal? Taking time to eat  How full do you feel after eating? Pt feels full after eats      Protein intake: 60-80 grams/day   Patient taking protein supplement: Yes. Brand of Supplement: Drinking Nectar Protein Shakes 1-2 times a day    Fluid intake: >64 oz/day    Multivitamin/mineral intake: Celebrate Chewable once a day  And one OTC MVI without iron  Calcium intake: Yes. Calcium Citrate chewable 500 mg TID    Other: B12 daily    Does patient exercise: walking and occasional jogging. Weight training three times a week      Assessment  Pt tolerating stage appropriate foods without difficulty. Pt supplements with protein shakes to meet estimated needs of 60-80 grams daily. Adequate water intake.   Taking vitamins to avoid vitamin/mineral

## 2018-03-01 NOTE — PROGRESS NOTES
10/30/2017        TSH   Lab Results   Component Value Date    TSH 1.600 05/01/2017        IRON   Lab Results   Component Value Date    IRON 90 05/01/2017        IONIZED CALCIUM   No results found for: BRIDGETTE PETER      Assessment:      1. S/P laparoscopic sleeve gastrectomy  CBC Auto Differential    Comprehensive Metabolic Panel    Ferritin    Hemoglobin A1C    Iron    Iron Binding Capacity    Prealbumin    PTH, Intact    TSH with Reflex    Vitamin B1    Vitamin D 25 Hydroxy   2. Overweight (BMI 25.0-29. 9)  CBC Auto Differential    Comprehensive Metabolic Panel    Ferritin    Hemoglobin A1C    Iron    Iron Binding Capacity    Prealbumin    PTH, Intact    TSH with Reflex    Vitamin B1    Vitamin D 25 Hydroxy   3. Postoperative intestinal malabsorption  CBC Auto Differential    Comprehensive Metabolic Panel    Ferritin    Hemoglobin A1C    Iron    Iron Binding Capacity    Prealbumin    PTH, Intact    TSH with Reflex    Vitamin B1    Vitamin D 25 Hydroxy   4. Vitamin D deficiency  PTH, Intact    Vitamin D 25 Hydroxy   5. Screening for malnutrition  CBC Auto Differential    Comprehensive Metabolic Panel    Ferritin    Hemoglobin A1C    Iron    Iron Binding Capacity    Prealbumin    PTH, Intact    TSH with Reflex    Vitamin B1    Vitamin D 25 Hydroxy   6. Depression, unspecified depression type       Plan:    Stay well hydrated. Drink a minimum of 64 oz of non-carbonated, non-caffeinated fluids daily. Nutritional education occurred during visit. Tolerating diet. Continue following with dietitian and follow their recommendations as directed. Continue  60-80  grams of protein each day. Continue to track. Signs and symptoms reviewed with patient that would be concerning and need her to return to office for re-evaluation. Patient will call if any questions or concerns arrise. Importance of physical activity discussed with patient.    Continue physical activity and strength training  Continue taking Multivitamin, Calcium

## 2018-03-01 NOTE — PATIENT INSTRUCTIONS
1. Continue bariatric vitamins as you are currently taking. 2. Goal continues to be 60-80 grams protein per day. Focus on choosing protein foods first at meals to remain full and maintain muscle mass while you continue to lose from body fat stores. 3. Regular physical activity is important if desire to continue weight loss efforts. Recommend regular cardiac activity and strength training 2-3 days per week. 4.  Water goal is 64 oz per day and make sure no liquids 30 minutes before meals and no liquids 30 minutes after meals  5. Take 20-30 minutes to eat meals and chew all foods well for best tolerance  Arrive 10 minutes early at next visit for SECA scale measurement and get lab work done at least 5 -7 days  before next office visit.

## 2018-03-05 ENCOUNTER — OFFICE VISIT (OUTPATIENT)
Dept: FAMILY MEDICINE CLINIC | Age: 43
End: 2018-03-05
Payer: COMMERCIAL

## 2018-03-05 VITALS
HEIGHT: 74 IN | HEART RATE: 60 BPM | RESPIRATION RATE: 12 BRPM | DIASTOLIC BLOOD PRESSURE: 72 MMHG | TEMPERATURE: 97.6 F | WEIGHT: 224 LBS | SYSTOLIC BLOOD PRESSURE: 116 MMHG | BODY MASS INDEX: 28.75 KG/M2

## 2018-03-05 DIAGNOSIS — S29.012A STRAIN OF RHOMBOID MUSCLE, INITIAL ENCOUNTER: ICD-10-CM

## 2018-03-05 DIAGNOSIS — F33.41 RECURRENT MAJOR DEPRESSIVE DISORDER, IN PARTIAL REMISSION (HCC): ICD-10-CM

## 2018-03-05 DIAGNOSIS — E11.9 DIET-CONTROLLED TYPE 2 DIABETES MELLITUS (HCC): Primary | ICD-10-CM

## 2018-03-05 LAB — HBA1C MFR BLD: 5.5 %

## 2018-03-05 PROCEDURE — 1036F TOBACCO NON-USER: CPT | Performed by: FAMILY MEDICINE

## 2018-03-05 PROCEDURE — 83036 HEMOGLOBIN GLYCOSYLATED A1C: CPT | Performed by: FAMILY MEDICINE

## 2018-03-05 PROCEDURE — 99214 OFFICE O/P EST MOD 30 MIN: CPT | Performed by: FAMILY MEDICINE

## 2018-03-05 PROCEDURE — G8417 CALC BMI ABV UP PARAM F/U: HCPCS | Performed by: FAMILY MEDICINE

## 2018-03-05 PROCEDURE — G8484 FLU IMMUNIZE NO ADMIN: HCPCS | Performed by: FAMILY MEDICINE

## 2018-03-05 PROCEDURE — 3044F HG A1C LEVEL LT 7.0%: CPT | Performed by: FAMILY MEDICINE

## 2018-03-05 PROCEDURE — G8427 DOCREV CUR MEDS BY ELIG CLIN: HCPCS | Performed by: FAMILY MEDICINE

## 2018-03-05 RX ORDER — TIZANIDINE 2 MG/1
2 TABLET ORAL EVERY 8 HOURS PRN
Qty: 60 TABLET | Refills: 1 | Status: SHIPPED | OUTPATIENT
Start: 2018-03-05 | End: 2018-06-21

## 2018-03-05 NOTE — PROGRESS NOTES
cyanosis, clubbing or edema of the lower extremities  Psych:  Normal affect without evidence of depression or anxiety, insight and judgement are appropriate, memory appears intact  Skin: warm and dry, no rash or erythema      ASSESSMENT & PLAN  Sherry Henderson was seen today for follow-up and back pain. Diagnoses and all orders for this visit:    Diet-controlled type 2 diabetes mellitus ([facility] [address]  -     POCT glycosylated hemoglobin (Hb A1C)    Strain of rhomboid muscle, initial encounter  -     tiZANidine (ZANAFLEX) 2 MG tablet; Take 1 tablet by mouth every 8 hours as needed (back pain)    Recurrent major depressive disorder, in partial remission ([facility] [address]    -Will trial zanaflex for rhomboid pain with HEP, advised to call in 2 weeks if no improvement, could consider PT down the line  -MDD and DM2 well controlled, continue current treatment. Return in about 6 months (around 9/5/2018). Sherry Henderson received counseling on the following healthy behaviors: medication adherence  Reviewed prior labs and health maintenance. Continue current medications, diet and exercise. Discussed use, benefit, and side effects of prescribed medications. Barriers to medication compliance addressed. Patient given educational materials - see patient instructions. All patient questions answered. Patient voiced understanding.

## 2018-04-17 RX ORDER — PEN NEEDLE, DIABETIC 31 GX5/16"
NEEDLE, DISPOSABLE MISCELLANEOUS
COMMUNITY
End: 2018-04-17 | Stop reason: SDUPTHER

## 2018-04-17 RX ORDER — PEN NEEDLE, DIABETIC 31 GX5/16"
NEEDLE, DISPOSABLE MISCELLANEOUS
Qty: 100 EACH | Refills: 3 | Status: SHIPPED | OUTPATIENT
Start: 2018-04-17 | End: 2019-04-30 | Stop reason: SDUPTHER

## 2018-06-12 ENCOUNTER — HOSPITAL ENCOUNTER (OUTPATIENT)
Age: 43
Discharge: HOME | End: 2018-06-12
Payer: COMMERCIAL

## 2018-06-12 DIAGNOSIS — E66.9 OBESITY (BMI 30.0-34.9): ICD-10-CM

## 2018-06-12 DIAGNOSIS — K91.2 POSTOPERATIVE INTESTINAL MALABSORPTION: ICD-10-CM

## 2018-06-12 DIAGNOSIS — Z98.84 S/P LAPAROSCOPIC SLEEVE GASTRECTOMY: ICD-10-CM

## 2018-06-12 LAB
ALBUMIN SERPL-MCNC: 4.2 G/DL (ref 3.5–5.1)
ALP BLD-CCNC: 60 U/L (ref 38–126)
ALT SERPL-CCNC: 24 U/L (ref 11–66)
ANION GAP SERPL CALCULATED.3IONS-SCNC: 12 MEQ/L (ref 8–16)
AST SERPL-CCNC: 21 U/L (ref 5–40)
BASOPHILS # BLD: 0.5 %
BASOPHILS ABSOLUTE: 0 THOU/MM3 (ref 0–0.1)
BILIRUB SERPL-MCNC: 0.6 MG/DL (ref 0.3–1.2)
BUN BLDV-MCNC: 11 MG/DL (ref 7–22)
CALCIUM SERPL-MCNC: 9 MG/DL (ref 8.5–10.5)
CHLORIDE BLD-SCNC: 105 MEQ/L (ref 98–111)
CO2: 27 MEQ/L (ref 23–33)
CREAT SERPL-MCNC: 0.7 MG/DL (ref 0.4–1.2)
EOSINOPHIL # BLD: 0.8 %
EOSINOPHILS ABSOLUTE: 0 THOU/MM3 (ref 0–0.4)
GFR SERPL CREATININE-BSD FRML MDRD: > 90 ML/MIN/1.73M2
GLUCOSE BLD-MCNC: 78 MG/DL (ref 70–108)
HCT VFR BLD CALC: 41.5 % (ref 42–52)
HEMOGLOBIN: 13.9 GM/DL (ref 14–18)
LYMPHOCYTES # BLD: 43.3 %
LYMPHOCYTES ABSOLUTE: 1.9 THOU/MM3 (ref 1–4.8)
MCH RBC QN AUTO: 31.1 PG (ref 27–31)
MCHC RBC AUTO-ENTMCNC: 33.5 GM/DL (ref 33–37)
MCV RBC AUTO: 92.9 FL (ref 80–94)
MONOCYTES # BLD: 8.5 %
MONOCYTES ABSOLUTE: 0.4 THOU/MM3 (ref 0.4–1.3)
NUCLEATED RED BLOOD CELLS: 0 /100 WBC
PDW BLD-RTO: 13.1 % (ref 11.5–14.5)
PLATELET # BLD: 138 THOU/MM3 (ref 130–400)
PMV BLD AUTO: 9.8 FL (ref 7.4–10.4)
POTASSIUM SERPL-SCNC: 4.4 MEQ/L (ref 3.5–5.2)
RBC # BLD: 4.47 MILL/MM3 (ref 4.7–6.1)
SEG NEUTROPHILS: 46.9 %
SEGMENTED NEUTROPHILS ABSOLUTE COUNT: 2.1 THOU/MM3 (ref 1.8–7.7)
SODIUM BLD-SCNC: 144 MEQ/L (ref 135–145)
TOTAL PROTEIN: 6.5 G/DL (ref 6.1–8)
VITAMIN D 25-HYDROXY: 42 NG/ML (ref 30–100)
WBC # BLD: 4.5 THOU/MM3 (ref 4.8–10.8)

## 2018-06-12 PROCEDURE — 80053 COMPREHEN METABOLIC PANEL: CPT

## 2018-06-12 PROCEDURE — 82306 VITAMIN D 25 HYDROXY: CPT

## 2018-06-12 PROCEDURE — 84425 ASSAY OF VITAMIN B-1: CPT

## 2018-06-12 PROCEDURE — 84134 ASSAY OF PREALBUMIN: CPT

## 2018-06-12 PROCEDURE — 85025 COMPLETE CBC W/AUTO DIFF WBC: CPT

## 2018-06-12 PROCEDURE — 36415 COLL VENOUS BLD VENIPUNCTURE: CPT

## 2018-06-13 LAB — PREALBUMIN: 21 MG/DL (ref 20–40)

## 2018-06-17 LAB — VITAMIN B1 WHOLE BLOOD: 107 NMOL/L (ref 70–180)

## 2018-06-21 ENCOUNTER — OFFICE VISIT (OUTPATIENT)
Dept: BARIATRICS/WEIGHT MGMT | Age: 43
End: 2018-06-21
Payer: COMMERCIAL

## 2018-06-21 VITALS
BODY MASS INDEX: 25.59 KG/M2 | HEIGHT: 74 IN | HEART RATE: 52 BPM | WEIGHT: 199.4 LBS | RESPIRATION RATE: 18 BRPM | TEMPERATURE: 97.4 F | DIASTOLIC BLOOD PRESSURE: 70 MMHG | SYSTOLIC BLOOD PRESSURE: 112 MMHG

## 2018-06-21 DIAGNOSIS — E66.3 OVERWEIGHT (BMI 25.0-29.9): ICD-10-CM

## 2018-06-21 DIAGNOSIS — F32.A DEPRESSION, UNSPECIFIED DEPRESSION TYPE: ICD-10-CM

## 2018-06-21 DIAGNOSIS — Z98.84 S/P LAPAROSCOPIC SLEEVE GASTRECTOMY: Primary | ICD-10-CM

## 2018-06-21 DIAGNOSIS — E11.9 CONTROLLED TYPE 2 DIABETES MELLITUS WITHOUT COMPLICATION, WITHOUT LONG-TERM CURRENT USE OF INSULIN (HCC): ICD-10-CM

## 2018-06-21 PROCEDURE — 3044F HG A1C LEVEL LT 7.0%: CPT | Performed by: PHYSICIAN ASSISTANT

## 2018-06-21 PROCEDURE — 2022F DILAT RTA XM EVC RTNOPTHY: CPT | Performed by: PHYSICIAN ASSISTANT

## 2018-06-21 PROCEDURE — G8417 CALC BMI ABV UP PARAM F/U: HCPCS | Performed by: PHYSICIAN ASSISTANT

## 2018-06-21 PROCEDURE — 1036F TOBACCO NON-USER: CPT | Performed by: PHYSICIAN ASSISTANT

## 2018-06-21 PROCEDURE — G8427 DOCREV CUR MEDS BY ELIG CLIN: HCPCS | Performed by: PHYSICIAN ASSISTANT

## 2018-06-21 PROCEDURE — 99213 OFFICE O/P EST LOW 20 MIN: CPT | Performed by: PHYSICIAN ASSISTANT

## 2018-07-28 RX ORDER — FLUOXETINE HYDROCHLORIDE 20 MG/1
20 CAPSULE ORAL DAILY
Qty: 30 CAPSULE | Refills: 5 | Status: SHIPPED | OUTPATIENT
Start: 2018-07-28 | End: 2019-01-28 | Stop reason: SDUPTHER

## 2018-09-06 ENCOUNTER — OFFICE VISIT (OUTPATIENT)
Dept: FAMILY MEDICINE CLINIC | Age: 43
End: 2018-09-06
Payer: COMMERCIAL

## 2018-09-06 VITALS
SYSTOLIC BLOOD PRESSURE: 116 MMHG | TEMPERATURE: 97.4 F | RESPIRATION RATE: 16 BRPM | WEIGHT: 193 LBS | HEIGHT: 73 IN | BODY MASS INDEX: 25.58 KG/M2 | DIASTOLIC BLOOD PRESSURE: 74 MMHG | HEART RATE: 58 BPM

## 2018-09-06 DIAGNOSIS — R73.9 HYPERGLYCEMIA: ICD-10-CM

## 2018-09-06 DIAGNOSIS — Z00.00 ANNUAL PHYSICAL EXAM: Primary | ICD-10-CM

## 2018-09-06 PROBLEM — E11.29 MICROALBUMINURIA DUE TO TYPE 2 DIABETES MELLITUS (HCC): Status: RESOLVED | Noted: 2017-05-23 | Resolved: 2018-09-06

## 2018-09-06 PROBLEM — I10 ESSENTIAL HYPERTENSION: Status: RESOLVED | Noted: 2017-07-19 | Resolved: 2018-09-06

## 2018-09-06 PROBLEM — R80.9 MICROALBUMINURIA DUE TO TYPE 2 DIABETES MELLITUS (HCC): Status: RESOLVED | Noted: 2017-05-23 | Resolved: 2018-09-06

## 2018-09-06 LAB — HBA1C MFR BLD: 5.4 %

## 2018-09-06 PROCEDURE — 99396 PREV VISIT EST AGE 40-64: CPT | Performed by: FAMILY MEDICINE

## 2018-09-06 PROCEDURE — 83036 HEMOGLOBIN GLYCOSYLATED A1C: CPT | Performed by: FAMILY MEDICINE

## 2018-09-06 NOTE — PROGRESS NOTES
Have you changed or started any medications since your last visit including any over-the-counter medicines, vitamins, or herbal medicines? no   Are you having any side effects from any of your medications? -  no  Have you stopped taking any of your medications? Is so, why? -  no    Have you seen any other physician or provider since your last visit? Yes - Records Obtained  Have you had any other diagnostic tests since your last visit? No  Have you been seen in the emergency room and/or had an admission to a hospital since we last saw you? No  Have you had your routine dental cleaning in the past 6 months? no    Have you activated your [employer] account? If not, what are your barriers? Yes     Patient Care Team:  Gianna Woods, DO as PCP - General (Family Medicine)  Gianna Woods, DO as PCP - [facility] Attributed Provider    Medical History Review  Past Medical, Family, and Social History     Defer to provider.

## 2018-09-06 NOTE — PROGRESS NOTES
and family history and I have made updates where appropriate.     Review of Systems  Positive responses are highlighted in bold    Constitutional:  Fever, Chills, Fatigue, Unexpected changes in weight  Eyes:  Eye discharge, Eye pain, Eye redness, Visual disturbances   HENT:  Ear pain, Tinnitus, Nosebleeds, Trouble swallowing  Cardiovascular:  Chest Pain, Palpitations  Respiratory:  Cough, Wheezing, Shortness of breath, Chest tightness, Apnea  Gastrointestinal:  Nausea, Vomiting, Diarrhea, Constipation, Heartburn, Blood in stool  Genitourinary:  Difficulty or painful urination, Flank pain, Change in frequency, Urgency  Skin:  Color change, Rash, Itching, Wound  Psychiatric:  Hallucinations, Anxiety, Depression, Suicidal ideation  Hematological:  Enlarged glands, Easy bleeding, Easily bruising  Musculoskeletal:  Joint pain, Back pain, Gait problems, Joint swelling, Myalgias  Neurological:  Dizziness, Headaches, Presyncope, Numbness, Seizures, Tremors  Allergy:  Environmental allergies, Food allergies  Endocrine:  Heat Intolerance, Cold Intolerance, Polydipsia, Polyphagia, Polyuria      PHYSICAL EXAM:  Vitals:    09/06/18 1643   BP: 116/74   Pulse: 58   Resp: 16   Temp: 97.4 °F (36.3 °C)     General Appearance: well developed and well- nourished, in no acute distress  Head: normocephalic and atraumatic  Eyes: extraocular eye movements intact, conjunctivae and eye lids without erythema  ENT: external ear and ear canal normal bilaterally, TMs intact and regular, nose without deformity, nasal mucosa and turbinates normal without polyps, oropharynx normal, dentition is normal for age  Neck: supple and non-tender without mass, no thyromegaly or thyroid nodules, no cervical lymphadenopathy  Pulmonary/Chest: clear to auscultation bilaterally- no wheezes, rales or rhonchi, normal air movement, no respiratory distress or retractions  Cardiovascular: normal rate, regular rhythm, normal S1 and S2, no murmurs, rubs, clicks, or gallops, distal pulses intact, no carotid bruits  Abdomen: soft, non-tender, non-distended, normal bowel sounds,  no rebound or guarding, no masses or hernias noted  Extremities: no cyanosis, clubbing or edema of the lower extremities  Musculoskeletal: No joint swelling or gross deformity   Neuro:  Alert, 5/5 strength globally and symmetrically,  Skin: warm and dry, no rash or erythema  Psych:  Affect appropriate. Thought process is normal without evidence of depression or psychosis. Good insight and appropriae interaction. Cognition and memory appear to be intact. ASSESSMENT & PLAN  Lee Ann Rios was seen today for follow-up. Diagnoses and all orders for this visit:    Annual physical exam    Hyperglycemia  -     POCT glycosylated hemoglobin (Hb A1C)    -A1c normal off of meds is normal, does not have a current dx of DM2  -Doing very well, can call me if any issues    Return in about 1 year (around 9/6/2019). Counseling was provided today regarding the following topics: Healthy eating habits, Regular exercise, substance abuse and healthy sleep habits.

## 2018-09-13 ENCOUNTER — OFFICE VISIT (OUTPATIENT)
Dept: BARIATRICS/WEIGHT MGMT | Age: 43
End: 2018-09-13
Payer: COMMERCIAL

## 2018-09-13 VITALS
DIASTOLIC BLOOD PRESSURE: 70 MMHG | SYSTOLIC BLOOD PRESSURE: 113 MMHG | BODY MASS INDEX: 24.64 KG/M2 | HEART RATE: 53 BPM | TEMPERATURE: 98 F | WEIGHT: 192 LBS | HEIGHT: 74 IN

## 2018-09-13 DIAGNOSIS — Z13.21 SCREENING FOR MALNUTRITION: ICD-10-CM

## 2018-09-13 DIAGNOSIS — Z98.84 S/P LAPAROSCOPIC SLEEVE GASTRECTOMY: Primary | ICD-10-CM

## 2018-09-13 DIAGNOSIS — F32.A DEPRESSION, UNSPECIFIED DEPRESSION TYPE: ICD-10-CM

## 2018-09-13 DIAGNOSIS — K91.2 POSTSURGICAL MALABSORPTION: ICD-10-CM

## 2018-09-13 PROCEDURE — G8427 DOCREV CUR MEDS BY ELIG CLIN: HCPCS | Performed by: PHYSICIAN ASSISTANT

## 2018-09-13 PROCEDURE — 1036F TOBACCO NON-USER: CPT | Performed by: PHYSICIAN ASSISTANT

## 2018-09-13 PROCEDURE — 99213 OFFICE O/P EST LOW 20 MIN: CPT | Performed by: PHYSICIAN ASSISTANT

## 2018-09-13 PROCEDURE — G8420 CALC BMI NORM PARAMETERS: HCPCS | Performed by: PHYSICIAN ASSISTANT

## 2018-12-04 ENCOUNTER — HOSPITAL ENCOUNTER (OUTPATIENT)
Age: 43
Discharge: HOME | End: 2018-12-04
Payer: COMMERCIAL

## 2018-12-04 DIAGNOSIS — K91.2 POSTSURGICAL MALABSORPTION: ICD-10-CM

## 2018-12-04 DIAGNOSIS — Z13.21 SCREENING FOR MALNUTRITION: ICD-10-CM

## 2018-12-04 DIAGNOSIS — Z98.84 S/P LAPAROSCOPIC SLEEVE GASTRECTOMY: ICD-10-CM

## 2018-12-04 LAB
ALBUMIN SERPL-MCNC: 4.8 G/DL (ref 3.5–5.1)
ALP BLD-CCNC: 58 U/L (ref 38–126)
ALT SERPL-CCNC: 20 U/L (ref 11–66)
ANION GAP SERPL CALCULATED.3IONS-SCNC: 15 MEQ/L (ref 8–16)
AST SERPL-CCNC: 21 U/L (ref 5–40)
AVERAGE GLUCOSE: 99 MG/DL (ref 70–126)
BASOPHILS # BLD: 0.3 %
BASOPHILS ABSOLUTE: 0 THOU/MM3 (ref 0–0.1)
BILIRUB SERPL-MCNC: 0.5 MG/DL (ref 0.3–1.2)
BUN BLDV-MCNC: 20 MG/DL (ref 7–22)
CALCIUM SERPL-MCNC: 9.9 MG/DL (ref 8.5–10.5)
CHLORIDE BLD-SCNC: 100 MEQ/L (ref 98–111)
CHOLESTEROL, TOTAL: 188 MG/DL (ref 100–199)
CO2: 27 MEQ/L (ref 23–33)
CREAT SERPL-MCNC: 0.8 MG/DL (ref 0.4–1.2)
EOSINOPHIL # BLD: 0.8 %
EOSINOPHILS ABSOLUTE: 0.1 THOU/MM3 (ref 0–0.4)
ERYTHROCYTE [DISTWIDTH] IN BLOOD BY AUTOMATED COUNT: 12.3 % (ref 11.5–14.5)
ERYTHROCYTE [DISTWIDTH] IN BLOOD BY AUTOMATED COUNT: 40.6 FL (ref 35–45)
GFR SERPL CREATININE-BSD FRML MDRD: > 90 ML/MIN/1.73M2
GLUCOSE BLD-MCNC: 81 MG/DL (ref 70–108)
HBA1C MFR BLD: 5.3 % (ref 4.4–6.4)
HCT VFR BLD CALC: 44.1 % (ref 42–52)
HDLC SERPL-MCNC: 55 MG/DL
HEMOGLOBIN: 15 GM/DL (ref 14–18)
IMMATURE GRANS (ABS): 0.01 THOU/MM3 (ref 0–0.07)
IMMATURE GRANULOCYTES: 0.1 %
LDL CHOLESTEROL CALCULATED: 119 MG/DL
LYMPHOCYTES # BLD: 49.7 %
LYMPHOCYTES ABSOLUTE: 4.4 THOU/MM3 (ref 1–4.8)
MCH RBC QN AUTO: 31 PG (ref 26–33)
MCHC RBC AUTO-ENTMCNC: 34 GM/DL (ref 32.2–35.5)
MCV RBC AUTO: 91.1 FL (ref 80–94)
MONOCYTES # BLD: 8 %
MONOCYTES ABSOLUTE: 0.7 THOU/MM3 (ref 0.4–1.3)
NUCLEATED RED BLOOD CELLS: 0 /100 WBC
PLATELET # BLD: 141 THOU/MM3 (ref 130–400)
PMV BLD AUTO: 11.3 FL (ref 9.4–12.4)
POTASSIUM SERPL-SCNC: 4.3 MEQ/L (ref 3.5–5.2)
PTH INTACT: 34 PG/ML (ref 15–65)
RBC # BLD: 4.84 MILL/MM3 (ref 4.7–6.1)
SEG NEUTROPHILS: 41.1 %
SEGMENTED NEUTROPHILS ABSOLUTE COUNT: 3.6 THOU/MM3 (ref 1.8–7.7)
SODIUM BLD-SCNC: 142 MEQ/L (ref 135–145)
TOTAL PROTEIN: 6.9 G/DL (ref 6.1–8)
TRIGL SERPL-MCNC: 71 MG/DL (ref 0–199)
TSH SERPL DL<=0.05 MIU/L-ACNC: 1.79 UIU/ML (ref 0.4–4.2)
TSH SERPL DL<=0.05 MIU/L-ACNC: 1.81 UIU/ML (ref 0.4–4.2)
WBC # BLD: 8.8 THOU/MM3 (ref 4.8–10.8)

## 2018-12-04 PROCEDURE — 84443 ASSAY THYROID STIM HORMONE: CPT

## 2018-12-04 PROCEDURE — 84425 ASSAY OF VITAMIN B-1: CPT

## 2018-12-04 PROCEDURE — 80053 COMPREHEN METABOLIC PANEL: CPT

## 2018-12-04 PROCEDURE — 80061 LIPID PANEL: CPT

## 2018-12-04 PROCEDURE — 82746 ASSAY OF FOLIC ACID SERUM: CPT

## 2018-12-04 PROCEDURE — 83036 HEMOGLOBIN GLYCOSYLATED A1C: CPT

## 2018-12-04 PROCEDURE — 82306 VITAMIN D 25 HYDROXY: CPT

## 2018-12-04 PROCEDURE — 82728 ASSAY OF FERRITIN: CPT

## 2018-12-04 PROCEDURE — 83550 IRON BINDING TEST: CPT

## 2018-12-04 PROCEDURE — 83970 ASSAY OF PARATHORMONE: CPT

## 2018-12-04 PROCEDURE — 83540 ASSAY OF IRON: CPT

## 2018-12-04 PROCEDURE — 85025 COMPLETE CBC W/AUTO DIFF WBC: CPT

## 2018-12-04 PROCEDURE — 36415 COLL VENOUS BLD VENIPUNCTURE: CPT

## 2018-12-04 PROCEDURE — 82607 VITAMIN B-12: CPT

## 2018-12-04 PROCEDURE — 84134 ASSAY OF PREALBUMIN: CPT

## 2018-12-05 LAB
FERRITIN: 357 NG/ML (ref 22–322)
FOLATE: 14.4 NG/ML (ref 4.8–24.2)
IRON: 79 UG/DL (ref 65–195)
PREALBUMIN: 25 MG/DL (ref 20–40)
TOTAL IRON BINDING CAPACITY: 258 UG/DL (ref 171–450)
VITAMIN B-12: 810 PG/ML (ref 211–911)
VITAMIN D 25-HYDROXY: 38 NG/ML (ref 30–100)

## 2018-12-10 ENCOUNTER — OFFICE VISIT (OUTPATIENT)
Dept: BARIATRICS/WEIGHT MGMT | Age: 43
End: 2018-12-10
Payer: COMMERCIAL

## 2018-12-10 VITALS
DIASTOLIC BLOOD PRESSURE: 64 MMHG | WEIGHT: 195 LBS | BODY MASS INDEX: 25.03 KG/M2 | SYSTOLIC BLOOD PRESSURE: 116 MMHG | TEMPERATURE: 97.6 F | HEART RATE: 52 BPM | HEIGHT: 74 IN

## 2018-12-10 DIAGNOSIS — Z13.21 SCREENING FOR MALNUTRITION: ICD-10-CM

## 2018-12-10 DIAGNOSIS — Z98.84 S/P LAPAROSCOPIC SLEEVE GASTRECTOMY: Primary | ICD-10-CM

## 2018-12-10 DIAGNOSIS — K91.2 POSTSURGICAL MALABSORPTION: ICD-10-CM

## 2018-12-10 DIAGNOSIS — F32.A DEPRESSION, UNSPECIFIED DEPRESSION TYPE: ICD-10-CM

## 2018-12-10 LAB — VITAMIN B1 WHOLE BLOOD: 133 NMOL/L (ref 70–180)

## 2018-12-10 PROCEDURE — 99213 OFFICE O/P EST LOW 20 MIN: CPT | Performed by: PHYSICIAN ASSISTANT

## 2018-12-10 PROCEDURE — G8427 DOCREV CUR MEDS BY ELIG CLIN: HCPCS | Performed by: PHYSICIAN ASSISTANT

## 2018-12-10 PROCEDURE — G8417 CALC BMI ABV UP PARAM F/U: HCPCS | Performed by: PHYSICIAN ASSISTANT

## 2018-12-10 PROCEDURE — G8484 FLU IMMUNIZE NO ADMIN: HCPCS | Performed by: PHYSICIAN ASSISTANT

## 2018-12-10 PROCEDURE — 1036F TOBACCO NON-USER: CPT | Performed by: PHYSICIAN ASSISTANT

## 2018-12-10 NOTE — PROGRESS NOTES
Neuro: Cranial Nerves Grossly Intact; nml coordination        Labs:  CBC   Lab Results   Component Value Date    WBC 8.8 12/04/2018    RBC 4.84 12/04/2018    HGB 15.0 12/04/2018    HCT 44.1 12/04/2018    MCV 91.1 12/04/2018    MCH 31.0 12/04/2018    MCHC 34.0 12/04/2018    RDW 12.8 07/12/2018     12/04/2018    MPV 11.3 12/04/2018    RBCMORP NORMAL 05/01/2017    SEGSPCT 41.1 12/04/2018    LABLYMP 49.7 12/04/2018    MONOPCT 8.0 12/04/2018    MONOPCT 8.4 07/12/2018    LABEOS 0.8 12/04/2018    BASO 0.3 12/04/2018    NRBC 0 12/04/2018    SEGSABS 3.6 12/04/2018    LYMPHSABS 4.4 12/04/2018    MONOSABS 0.7 12/04/2018    EOSABS 0.1 12/04/2018    BASOSABS 0.0 12/04/2018        BMP/CMP   Lab Results   Component Value Date    GLUCOSE 81 12/04/2018    GLUCOSE 90 07/12/2018    CREATININE 0.8 12/04/2018    BUN 20 12/04/2018     12/04/2018    K 4.3 12/04/2018     12/04/2018    CO2 27 12/04/2018    CALCIUM 9.9 12/04/2018    AST 21 12/04/2018    ALKPHOS 58 12/04/2018    PROT 6.9 12/04/2018    LABALBU 4.8 12/04/2018    LABALBU 4.6 07/15/2011    BILITOT 0.5 12/04/2018    ALT 20 12/04/2018        PREALBUMIN   Lab Results   Component Value Date    PREALBUMIN 25.0 12/04/2018        VITAMIN B12   Lab Results   Component Value Date    GQTMKGKB31 810 12/04/2018        24 HOUR URINE CALCIUM   No results found for: URVOLMEAS, [place], CALCIUMUR     VITAMIN D   Lab Results   Component Value Date    VITD25 38 12/04/2018        VITAMIN B1/ THIAMINE   Lab Results   Component Value Date    [id number] 133 12/04/2018        RBC FOLATE   Lab Results   Component Value Date    FOLATE 14.4 12/04/2018        LIPID SCREEN (FASTING)   Lab Results   Component Value Date    CHOL 188 12/04/2018    TRIG 71 12/04/2018    HDL 55 12/04/2018    LDLCALC 119 12/04/2018   ,     HGA1C (T2DM ONLY)   Lab Results   Component Value Date    LABA1C 5.3 12/04/2018    AVGG 99 12/04/2018        TSH   Lab Results   Component Value Date    TSH 1.810

## 2019-01-28 RX ORDER — FLUOXETINE HYDROCHLORIDE 20 MG/1
20 CAPSULE ORAL DAILY
Qty: 30 CAPSULE | Refills: 5 | Status: SHIPPED | OUTPATIENT
Start: 2019-01-28 | End: 2019-08-06 | Stop reason: SDUPTHER

## 2019-01-29 ENCOUNTER — OFFICE VISIT (OUTPATIENT)
Dept: FAMILY MEDICINE CLINIC | Age: 44
End: 2019-01-29
Payer: COMMERCIAL

## 2019-01-29 VITALS
DIASTOLIC BLOOD PRESSURE: 70 MMHG | WEIGHT: 200.8 LBS | HEART RATE: 65 BPM | HEIGHT: 75 IN | BODY MASS INDEX: 24.97 KG/M2 | TEMPERATURE: 98 F | RESPIRATION RATE: 14 BRPM | SYSTOLIC BLOOD PRESSURE: 116 MMHG

## 2019-01-29 DIAGNOSIS — R20.2 LEFT LEG PARESTHESIAS: Primary | ICD-10-CM

## 2019-01-29 DIAGNOSIS — M25.511 ACUTE PAIN OF RIGHT SHOULDER: ICD-10-CM

## 2019-01-29 PROCEDURE — G8427 DOCREV CUR MEDS BY ELIG CLIN: HCPCS | Performed by: FAMILY MEDICINE

## 2019-01-29 PROCEDURE — 1036F TOBACCO NON-USER: CPT | Performed by: FAMILY MEDICINE

## 2019-01-29 PROCEDURE — 99214 OFFICE O/P EST MOD 30 MIN: CPT | Performed by: FAMILY MEDICINE

## 2019-01-29 PROCEDURE — G8420 CALC BMI NORM PARAMETERS: HCPCS | Performed by: FAMILY MEDICINE

## 2019-01-29 PROCEDURE — G8484 FLU IMMUNIZE NO ADMIN: HCPCS | Performed by: FAMILY MEDICINE

## 2019-01-29 RX ORDER — PREDNISONE 20 MG/1
40 TABLET ORAL DAILY
Qty: 14 TABLET | Refills: 0 | Status: SHIPPED | OUTPATIENT
Start: 2019-01-29 | End: 2019-02-05

## 2019-02-01 ENCOUNTER — HOSPITAL ENCOUNTER (OUTPATIENT)
Age: 44
Discharge: HOME | End: 2019-02-01
Payer: COMMERCIAL

## 2019-02-01 ENCOUNTER — HOSPITAL ENCOUNTER (OUTPATIENT)
Dept: GENERAL RADIOLOGY | Age: 44
Discharge: HOME | End: 2019-02-01
Payer: COMMERCIAL

## 2019-02-01 DIAGNOSIS — M25.511 ACUTE PAIN OF RIGHT SHOULDER: ICD-10-CM

## 2019-02-01 PROCEDURE — 73030 X-RAY EXAM OF SHOULDER: CPT

## 2019-02-05 ENCOUNTER — PATIENT MESSAGE (OUTPATIENT)
Dept: FAMILY MEDICINE CLINIC | Age: 44
End: 2019-02-05

## 2019-02-05 DIAGNOSIS — M25.512 ACUTE PAIN OF LEFT SHOULDER: Primary | ICD-10-CM

## 2019-02-06 DIAGNOSIS — M25.511 ACUTE PAIN OF RIGHT SHOULDER: Primary | ICD-10-CM

## 2019-02-19 ENCOUNTER — PROCEDURE VISIT (OUTPATIENT)
Dept: NEUROLOGY | Age: 44
End: 2019-02-19
Payer: COMMERCIAL

## 2019-02-19 DIAGNOSIS — R20.0 LEFT LEG NUMBNESS: Primary | ICD-10-CM

## 2019-02-19 DIAGNOSIS — M79.605 LEFT LEG PAIN: ICD-10-CM

## 2019-02-19 PROCEDURE — 95908 NRV CNDJ TST 3-4 STUDIES: CPT | Performed by: PSYCHIATRY & NEUROLOGY

## 2019-02-19 PROCEDURE — 95886 MUSC TEST DONE W/N TEST COMP: CPT | Performed by: PSYCHIATRY & NEUROLOGY

## 2019-04-30 RX ORDER — PEN NEEDLE, DIABETIC 31 GX5/16"
NEEDLE, DISPOSABLE MISCELLANEOUS
Qty: 100 EACH | Refills: 3 | Status: SHIPPED | OUTPATIENT
Start: 2019-04-30 | End: 2019-06-12 | Stop reason: ALTCHOICE

## 2019-06-12 ENCOUNTER — OFFICE VISIT (OUTPATIENT)
Dept: BARIATRICS/WEIGHT MGMT | Age: 44
End: 2019-06-12
Payer: COMMERCIAL

## 2019-06-12 VITALS
BODY MASS INDEX: 26.21 KG/M2 | TEMPERATURE: 97.7 F | RESPIRATION RATE: 18 BRPM | HEIGHT: 74 IN | SYSTOLIC BLOOD PRESSURE: 120 MMHG | DIASTOLIC BLOOD PRESSURE: 74 MMHG | WEIGHT: 204.2 LBS | HEART RATE: 60 BPM

## 2019-06-12 DIAGNOSIS — Z98.84 S/P LAPAROSCOPIC SLEEVE GASTRECTOMY: Primary | ICD-10-CM

## 2019-06-12 DIAGNOSIS — Z13.21 SCREENING FOR MALNUTRITION: ICD-10-CM

## 2019-06-12 DIAGNOSIS — K91.2 POSTSURGICAL MALABSORPTION: ICD-10-CM

## 2019-06-12 DIAGNOSIS — F32.A DEPRESSION, UNSPECIFIED DEPRESSION TYPE: ICD-10-CM

## 2019-06-12 DIAGNOSIS — E66.3 OVERWEIGHT (BMI 25.0-29.9): ICD-10-CM

## 2019-06-12 PROCEDURE — 99213 OFFICE O/P EST LOW 20 MIN: CPT | Performed by: PHYSICIAN ASSISTANT

## 2019-06-12 PROCEDURE — G8417 CALC BMI ABV UP PARAM F/U: HCPCS | Performed by: PHYSICIAN ASSISTANT

## 2019-06-12 PROCEDURE — 1036F TOBACCO NON-USER: CPT | Performed by: PHYSICIAN ASSISTANT

## 2019-06-12 PROCEDURE — G8427 DOCREV CUR MEDS BY ELIG CLIN: HCPCS | Performed by: PHYSICIAN ASSISTANT

## 2019-06-12 NOTE — PROGRESS NOTES
education level: Not on file   Occupational History    Not on file   Social Needs    Financial resource strain: Not on file    Food insecurity:     Worry: Not on file     Inability: Not on file    Transportation needs:     Medical: Not on file     Non-medical: Not on file   Tobacco Use    Smoking status: Former Smoker     Last attempt to quit: 2011     Years since quittin.4    Smokeless tobacco: Former User     Types: Chew   Substance and Sexual Activity    Alcohol use: No    Drug use: No    Sexual activity: Not on file   Lifestyle    Physical activity:     Days per week: Not on file     Minutes per session: Not on file    Stress: Not on file   Relationships    Social connections:     Talks on phone: Not on file     Gets together: Not on file     Attends Tenriism service: Not on file     Active member of club or organization: Not on file     Attends meetings of clubs or organizations: Not on file     Relationship status: Not on file    Intimate partner violence:     Fear of current or ex partner: Not on file     Emotionally abused: Not on file     Physically abused: Not on file     Forced sexual activity: Not on file   Other Topics Concern    Not on file   Social History Narrative    Not on file        Medications:   Current Outpatient Medications   Medication Sig Dispense Refill    Multiple Vitamin (MULTI VITAMIN) TABS Take 1 tablet by mouth daily 90 tablet 1    FLUoxetine (PROZAC) 20 MG capsule Take 1 capsule by mouth daily 30 capsule 5    Calcium Citrate-Vitamin D (CALCIUM + VIT D, BARIATRIC ADVANTAGE, CHEWABLE TABLET) Take 1 tablet by mouth 3 times daily      Cyanocobalamin (VITAMIN B-12 SL) Place 1 tablet under the tongue daily       No current facility-administered medications for this visit. Allergies:   No Known Allergies    Subjective:    Constitutional: Denies any fever, chills, fatigue. Wound: Denies any rash, skin color changes or wound problems.   Resp: Denies any cough, shortness of breath. CV: Denies any chest pain, orthopnea or syncope. MS: Denies myalgias, arthralgias. GI: Denies any nausea, vomiting, diarrhea, constipation, melena, hematochezia. No incisional discomfort. : Denies any hematuria, hesitancy or dysuria. NEURO: Denies seizures, headache. Objective:    /74 (Site: Right Upper Arm, Position: Sitting, Cuff Size: Large Adult)   Pulse 60   Temp 97.7 °F (36.5 °C) (Oral)   Resp 18   Ht 6' 1.5\" (1.867 m)   Wt 204 lb 3.2 oz (92.6 kg)   BMI 26.58 kg/m²   Physical Examination:   Constitutional: Alert and oriented to person, place and time. Well-developed, well- nourished. Head: Normocephalic and atraumatic  Neck: Supple. Eyes: EOMI b/l. Conjunctivae normal.  No scleral icterus. Respiratory: Effort normal. No respiratory distress. Abd: Benign  Ext:  Movement x 4. No edema  Skin; Warm and dry, no visible rashes, lesions or ulcers.    Neuro: Cranial Nerves Grossly Intact; nml coordination    Labs:  CBC   Lab Results   Component Value Date    WBC 8.8 12/04/2018    RBC 4.84 12/04/2018    HGB 15.0 12/04/2018    HCT 44.1 12/04/2018    MCV 91.1 12/04/2018    MCH 31.0 12/04/2018    MCHC 34.0 12/04/2018    RDW 12.8 07/12/2018     12/04/2018    MPV 11.3 12/04/2018    RBCMORP NORMAL 05/01/2017    SEGSPCT 41.1 12/04/2018    LABLYMP 49.7 12/04/2018    MONOPCT 8.0 12/04/2018    MONOPCT 8.4 07/12/2018    LABEOS 0.8 12/04/2018    BASO 0.3 12/04/2018    NRBC 0 12/04/2018    SEGSABS 3.6 12/04/2018    LYMPHSABS 4.4 12/04/2018    MONOSABS 0.7 12/04/2018    EOSABS 0.1 12/04/2018    BASOSABS 0.0 12/04/2018        BMP/CMP   Lab Results   Component Value Date    GLUCOSE 81 12/04/2018    GLUCOSE 90 07/12/2018    CREATININE 0.8 12/04/2018    BUN 20 12/04/2018     12/04/2018    K 4.3 12/04/2018     12/04/2018    CO2 27 12/04/2018    CALCIUM 9.9 12/04/2018    AST 21 12/04/2018    ALKPHOS 58 12/04/2018    PROT 6.9 12/04/2018    LABALBU 4.8 12/04/2018    LABALBU 4.6 07/15/2011    BILITOT 0.5 12/04/2018    ALT 20 12/04/2018        PREALBUMIN   Lab Results   Component Value Date    PREALBUMIN 25.0 12/04/2018        VITAMIN B12   Lab Results   Component Value Date    BFFJBYOD12 810 12/04/2018        24 HOUR URINE CALCIUM   No results found for: Anat BackGRISELDA     VITAMIN D   Lab Results   Component Value Date    VITD25 38 12/04/2018        VITAMIN B1/ THIAMINE   Lab Results   Component Value Date    FRBF5LLKHTR 133 12/04/2018        RBC FOLATE   Lab Results   Component Value Date    FOLATE 14.4 12/04/2018        LIPID SCREEN (FASTING)   Lab Results   Component Value Date    CHOL 188 12/04/2018    TRIG 71 12/04/2018    HDL 55 12/04/2018    LDLCALC 119 12/04/2018   ,     HGA1C (T2DM ONLY)   Lab Results   Component Value Date    LABA1C 5.3 12/04/2018    AVGG 99 12/04/2018        TSH   Lab Results   Component Value Date    TSH 1.810 12/04/2018        IRON   Lab Results   Component Value Date    IRON 79 12/04/2018        IONIZED CALCIUM   No results found for: BRIDGETTE PETER      Assessment:       Diagnosis Orders   1. S/P laparoscopic sleeve gastrectomy  CBC Auto Differential    Comprehensive Metabolic Panel    Ferritin    Hemoglobin A1C    Iron    Lipid Panel    Iron Binding Capacity    Vitamin D 25 Hydroxy    Vitamin B12 & Folate    Vitamin B1    TSH with Reflex    PTH, Intact    Prealbumin   2. Overweight (BMI 25.0-29. 9)  CBC Auto Differential    Comprehensive Metabolic Panel    Ferritin    Hemoglobin A1C    Iron    Lipid Panel    Iron Binding Capacity    Vitamin D 25 Hydroxy    Vitamin B12 & Folate    Vitamin B1    TSH with Reflex    PTH, Intact    Prealbumin   3. Postsurgical malabsorption  CBC Auto Differential    Comprehensive Metabolic Panel    Ferritin    Hemoglobin A1C    Iron    Lipid Panel    Iron Binding Capacity    Vitamin D 25 Hydroxy    Vitamin B12 & Folate    Vitamin B1    TSH with Reflex    PTH, Intact    Prealbumin   4.  Screening for malnutrition CBC Auto Differential    Comprehensive Metabolic Panel    Ferritin    Hemoglobin A1C    Iron    Lipid Panel    Iron Binding Capacity    Vitamin D 25 Hydroxy    Vitamin B12 & Folate    Vitamin B1    TSH with Reflex    PTH, Intact    Prealbumin   5. Depression, unspecified depression type       Plan:    Stay well hydrated. Drink a minimum of 64 oz of non-carbonated, non-caffeinated fluids daily. Nutritional education occurred during visit. Tolerating diet. Continue following  dietitian recommendations. Continue  60-80 grams of protein each day. Signs and symptoms reviewed with patient that would be concerning and need her to return to office for re-evaluation. Patient will call if any questions or concerns arrise. Importance of physical activity discussed with patient. Continue dedicated activity and strength training. Continue taking Multivitamin, Calcium and B12 as directed  Encouraged to attend support groups  2 year labs ordered- to be drawn prior to next apt  SECA scale next visit  Measurements next visit  Encouraged kitchen clean out. Avoid frequent snacking       I spent 15 minutes in direct patient care with greater than 50% spent in counseling and coordination of care.      Electronically signed by JAIMIE Campbell on 6/12/2019 at 10:32 AM

## 2019-08-27 ENCOUNTER — PATIENT MESSAGE (OUTPATIENT)
Dept: FAMILY MEDICINE CLINIC | Age: 44
End: 2019-08-27

## 2019-08-27 RX ORDER — FLUOXETINE HYDROCHLORIDE 40 MG/1
CAPSULE ORAL
Qty: 30 CAPSULE | Refills: 5 | Status: SHIPPED | OUTPATIENT
Start: 2019-08-27 | End: 2020-03-26

## 2019-08-27 NOTE — TELEPHONE ENCOUNTER
From: René Richards Parent  To: Chantale Win DO  Sent: 8/27/2019 9:24 AM EDT  Subject: Prescription Question    Dr. Oj Cruz, I sent a message a week or so ago regarding my Prozac dosage. I have noticed a significant downturn in my mood and it seems to be worsening. Please advise.  Thank you

## 2019-12-07 ENCOUNTER — HOSPITAL ENCOUNTER (OUTPATIENT)
Age: 44
Discharge: HOME | End: 2019-12-07
Payer: COMMERCIAL

## 2019-12-07 DIAGNOSIS — Z13.21 SCREENING FOR MALNUTRITION: ICD-10-CM

## 2019-12-07 DIAGNOSIS — Z98.84 S/P LAPAROSCOPIC SLEEVE GASTRECTOMY: ICD-10-CM

## 2019-12-07 DIAGNOSIS — K91.2 POSTSURGICAL MALABSORPTION: ICD-10-CM

## 2019-12-07 DIAGNOSIS — E66.3 OVERWEIGHT (BMI 25.0-29.9): ICD-10-CM

## 2019-12-07 LAB
ALBUMIN SERPL-MCNC: 4 G/DL (ref 3.5–5.1)
ALP BLD-CCNC: 40 U/L (ref 38–126)
ALT SERPL-CCNC: 15 U/L (ref 11–66)
ANION GAP SERPL CALCULATED.3IONS-SCNC: 13 MEQ/L (ref 8–16)
AST SERPL-CCNC: 18 U/L (ref 5–40)
AVERAGE GLUCOSE: 108 MG/DL (ref 70–126)
BASOPHILS # BLD: 0.7 %
BASOPHILS ABSOLUTE: 0 THOU/MM3 (ref 0–0.1)
BILIRUB SERPL-MCNC: 0.6 MG/DL (ref 0.3–1.2)
BUN BLDV-MCNC: 13 MG/DL (ref 7–22)
CALCIUM SERPL-MCNC: 9 MG/DL (ref 8.5–10.5)
CHLORIDE BLD-SCNC: 103 MEQ/L (ref 98–111)
CHOLESTEROL, TOTAL: 200 MG/DL (ref 100–199)
CO2: 25 MEQ/L (ref 23–33)
CREAT SERPL-MCNC: 0.7 MG/DL (ref 0.4–1.2)
EOSINOPHIL # BLD: 1.1 %
EOSINOPHILS ABSOLUTE: 0 THOU/MM3 (ref 0–0.4)
ERYTHROCYTE [DISTWIDTH] IN BLOOD BY AUTOMATED COUNT: 12.4 % (ref 11.5–14.5)
ERYTHROCYTE [DISTWIDTH] IN BLOOD BY AUTOMATED COUNT: 44.9 FL (ref 35–45)
FERRITIN: 242 NG/ML (ref 22–322)
FOLATE: 8.3 NG/ML (ref 4.8–24.2)
GFR SERPL CREATININE-BSD FRML MDRD: > 90 ML/MIN/1.73M2
GLUCOSE BLD-MCNC: 90 MG/DL (ref 70–108)
HBA1C MFR BLD: 5.6 % (ref 4.4–6.4)
HCT VFR BLD CALC: 46.3 % (ref 42–52)
HDLC SERPL-MCNC: 53 MG/DL
HEMOGLOBIN: 14.9 GM/DL (ref 14–18)
IMMATURE GRANS (ABS): 0 THOU/MM3 (ref 0–0.07)
IMMATURE GRANULOCYTES: 0 %
IRON: 198 UG/DL (ref 65–195)
LDL CHOLESTEROL CALCULATED: 129 MG/DL
LYMPHOCYTES # BLD: 39.1 %
LYMPHOCYTES ABSOLUTE: 1.8 THOU/MM3 (ref 1–4.8)
MCH RBC QN AUTO: 31.4 PG (ref 26–33)
MCHC RBC AUTO-ENTMCNC: 32.2 GM/DL (ref 32.2–35.5)
MCV RBC AUTO: 97.7 FL (ref 80–94)
MONOCYTES # BLD: 7.3 %
MONOCYTES ABSOLUTE: 0.3 THOU/MM3 (ref 0.4–1.3)
NUCLEATED RED BLOOD CELLS: 0 /100 WBC
PLATELET # BLD: 124 THOU/MM3 (ref 130–400)
PMV BLD AUTO: 11.5 FL (ref 9.4–12.4)
POTASSIUM SERPL-SCNC: 4 MEQ/L (ref 3.5–5.2)
PREALBUMIN: 29.4 MG/DL (ref 20–40)
PTH INTACT: 30.7 PG/ML (ref 15–65)
RBC # BLD: 4.74 MILL/MM3 (ref 4.7–6.1)
SEG NEUTROPHILS: 51.8 %
SEGMENTED NEUTROPHILS ABSOLUTE COUNT: 2.3 THOU/MM3 (ref 1.8–7.7)
SODIUM BLD-SCNC: 141 MEQ/L (ref 135–145)
TOTAL IRON BINDING CAPACITY: 248 UG/DL (ref 171–450)
TOTAL PROTEIN: 6 G/DL (ref 6.1–8)
TRIGL SERPL-MCNC: 91 MG/DL (ref 0–199)
TSH SERPL DL<=0.05 MIU/L-ACNC: 0.96 UIU/ML (ref 0.4–4.2)
VITAMIN B-12: 670 PG/ML (ref 211–911)
VITAMIN D 25-HYDROXY: 18 NG/ML (ref 30–100)
WBC # BLD: 4.5 THOU/MM3 (ref 4.8–10.8)

## 2019-12-07 PROCEDURE — 82728 ASSAY OF FERRITIN: CPT

## 2019-12-07 PROCEDURE — 85025 COMPLETE CBC W/AUTO DIFF WBC: CPT

## 2019-12-07 PROCEDURE — 84425 ASSAY OF VITAMIN B-1: CPT

## 2019-12-07 PROCEDURE — 84443 ASSAY THYROID STIM HORMONE: CPT

## 2019-12-07 PROCEDURE — 80061 LIPID PANEL: CPT

## 2019-12-07 PROCEDURE — 83036 HEMOGLOBIN GLYCOSYLATED A1C: CPT

## 2019-12-07 PROCEDURE — 82306 VITAMIN D 25 HYDROXY: CPT

## 2019-12-07 PROCEDURE — 36415 COLL VENOUS BLD VENIPUNCTURE: CPT

## 2019-12-07 PROCEDURE — 83550 IRON BINDING TEST: CPT

## 2019-12-07 PROCEDURE — 82746 ASSAY OF FOLIC ACID SERUM: CPT

## 2019-12-07 PROCEDURE — 84134 ASSAY OF PREALBUMIN: CPT

## 2019-12-07 PROCEDURE — 82607 VITAMIN B-12: CPT

## 2019-12-07 PROCEDURE — 83540 ASSAY OF IRON: CPT

## 2019-12-07 PROCEDURE — 80053 COMPREHEN METABOLIC PANEL: CPT

## 2019-12-07 PROCEDURE — 83970 ASSAY OF PARATHORMONE: CPT

## 2019-12-10 ENCOUNTER — TELEPHONE (OUTPATIENT)
Dept: BARIATRICS/WEIGHT MGMT | Age: 44
End: 2019-12-10

## 2019-12-10 ENCOUNTER — OFFICE VISIT (OUTPATIENT)
Dept: BARIATRICS/WEIGHT MGMT | Age: 44
End: 2019-12-10
Payer: COMMERCIAL

## 2019-12-10 VITALS
RESPIRATION RATE: 18 BRPM | WEIGHT: 206 LBS | DIASTOLIC BLOOD PRESSURE: 58 MMHG | HEART RATE: 56 BPM | TEMPERATURE: 98.5 F | BODY MASS INDEX: 26.44 KG/M2 | HEIGHT: 74 IN | SYSTOLIC BLOOD PRESSURE: 106 MMHG

## 2019-12-10 DIAGNOSIS — E66.3 OVERWEIGHT (BMI 25.0-29.9): ICD-10-CM

## 2019-12-10 DIAGNOSIS — Z98.84 S/P LAPAROSCOPIC SLEEVE GASTRECTOMY: Primary | ICD-10-CM

## 2019-12-10 DIAGNOSIS — F32.A DEPRESSION, UNSPECIFIED DEPRESSION TYPE: ICD-10-CM

## 2019-12-10 DIAGNOSIS — E55.9 VITAMIN D DEFICIENCY: Primary | ICD-10-CM

## 2019-12-10 DIAGNOSIS — E55.9 VITAMIN D DEFICIENCY: ICD-10-CM

## 2019-12-10 PROCEDURE — G8417 CALC BMI ABV UP PARAM F/U: HCPCS | Performed by: PHYSICIAN ASSISTANT

## 2019-12-10 PROCEDURE — G8484 FLU IMMUNIZE NO ADMIN: HCPCS | Performed by: PHYSICIAN ASSISTANT

## 2019-12-10 PROCEDURE — 99213 OFFICE O/P EST LOW 20 MIN: CPT | Performed by: PHYSICIAN ASSISTANT

## 2019-12-10 PROCEDURE — 1036F TOBACCO NON-USER: CPT | Performed by: PHYSICIAN ASSISTANT

## 2019-12-10 PROCEDURE — G8427 DOCREV CUR MEDS BY ELIG CLIN: HCPCS | Performed by: PHYSICIAN ASSISTANT

## 2019-12-10 RX ORDER — CHOLECALCIFEROL (VITAMIN D3) 1250 MCG
1 CAPSULE ORAL WEEKLY
Qty: 4 CAPSULE | Refills: 2 | Status: SHIPPED | OUTPATIENT
Start: 2019-12-10 | End: 2020-07-23

## 2020-03-12 RX ORDER — ISOPROPYL ALCOHOL 70 ML/100ML
SWAB TOPICAL
Refills: 10 | OUTPATIENT
Start: 2020-03-12

## 2020-03-26 RX ORDER — FLUOXETINE HYDROCHLORIDE 40 MG/1
CAPSULE ORAL
Qty: 30 CAPSULE | Refills: 5 | Status: SHIPPED | OUTPATIENT
Start: 2020-03-26 | End: 2020-07-23 | Stop reason: SDUPTHER

## 2020-04-05 ENCOUNTER — E-VISIT (OUTPATIENT)
Dept: FAMILY MEDICINE CLINIC | Age: 45
End: 2020-04-05
Payer: COMMERCIAL

## 2020-04-05 PROCEDURE — 99421 OL DIG E/M SVC 5-10 MIN: CPT | Performed by: PHYSICIAN ASSISTANT

## 2020-04-05 RX ORDER — TIZANIDINE 2 MG/1
2 TABLET ORAL EVERY 8 HOURS PRN
Qty: 30 TABLET | Refills: 0 | Status: SHIPPED | OUTPATIENT
Start: 2020-04-05 | End: 2020-05-01

## 2020-05-01 ENCOUNTER — E-VISIT (OUTPATIENT)
Dept: FAMILY MEDICINE CLINIC | Age: 45
End: 2020-05-01
Payer: COMMERCIAL

## 2020-05-01 PROCEDURE — 99421 OL DIG E/M SVC 5-10 MIN: CPT | Performed by: FAMILY MEDICINE

## 2020-05-01 RX ORDER — TIZANIDINE 4 MG/1
4 TABLET ORAL 3 TIMES DAILY PRN
Qty: 30 TABLET | Refills: 0 | Status: SHIPPED | OUTPATIENT
Start: 2020-05-01 | End: 2020-05-23 | Stop reason: SDUPTHER

## 2020-05-06 RX ORDER — ISOPROPYL ALCOHOL 70 ML/100ML
SWAB TOPICAL
Refills: 10 | OUTPATIENT
Start: 2020-05-06

## 2020-05-26 ENCOUNTER — PATIENT MESSAGE (OUTPATIENT)
Dept: FAMILY MEDICINE CLINIC | Age: 45
End: 2020-05-26

## 2020-05-26 RX ORDER — TIZANIDINE 4 MG/1
4 TABLET ORAL 3 TIMES DAILY PRN
Qty: 30 TABLET | Refills: 0 | Status: SHIPPED | OUTPATIENT
Start: 2020-05-26 | End: 2020-06-19 | Stop reason: SDUPTHER

## 2020-05-26 NOTE — TELEPHONE ENCOUNTER
From: Chon Bhat Parent  To: Julita Jorgensen DO  Sent: 5/23/2020 6:58 PM EDT  Subject: < No Subject>    Sorry to take so long to reply. I have no need for the alcohol pads      ----- Message -----   From:Cristian Kelley DO   Sent:5/6/2020 1:58 PM EDT   To:Humphrey Woodward   Subject:    [facility], I received a refill request for alcohol pads. Are you still checking your blood sugar regularly? Just wanted to make sure this was a mistake from the pharmacy.     Supriya Nicole

## 2020-06-08 RX ORDER — ISOPROPYL ALCOHOL 70 ML/100ML
SWAB TOPICAL
Qty: 100 EACH | Refills: 11 | Status: SHIPPED | OUTPATIENT
Start: 2020-06-08 | End: 2020-07-23

## 2020-06-10 ENCOUNTER — OFFICE VISIT (OUTPATIENT)
Dept: BARIATRICS/WEIGHT MGMT | Age: 45
End: 2020-06-10
Payer: COMMERCIAL

## 2020-06-10 VITALS
HEART RATE: 60 BPM | TEMPERATURE: 97.9 F | WEIGHT: 205 LBS | DIASTOLIC BLOOD PRESSURE: 74 MMHG | HEIGHT: 74 IN | BODY MASS INDEX: 26.31 KG/M2 | SYSTOLIC BLOOD PRESSURE: 118 MMHG

## 2020-06-10 PROCEDURE — 99213 OFFICE O/P EST LOW 20 MIN: CPT | Performed by: PHYSICIAN ASSISTANT

## 2020-06-10 PROCEDURE — 1036F TOBACCO NON-USER: CPT | Performed by: PHYSICIAN ASSISTANT

## 2020-06-10 PROCEDURE — G8417 CALC BMI ABV UP PARAM F/U: HCPCS | Performed by: PHYSICIAN ASSISTANT

## 2020-06-10 PROCEDURE — G8427 DOCREV CUR MEDS BY ELIG CLIN: HCPCS | Performed by: PHYSICIAN ASSISTANT

## 2020-06-10 NOTE — PROGRESS NOTES
[facility]  [facility]  [address], [address]  [address] [address]  Dept: [phone]  Loc: [phone]      Visit Date:  6/10/2020  Weight Management Postop Follow-up    HPI:      Candido Woodward is a 45 y.o. male who is here today for 2.5 year  follow up since  robotic-assisted sleeve gastrectomy performed by Dr. Carol Espinosa  on 12/11/17. Continues to do well. Has been grazing a little more throughout the day as he has been home and not working. Has been a little off schedule. Doing his best staying on track with better nutrition. Trying to cut out grazing. Down 1# since last visit. Down 79# since surgery. BMI 26 . Drinking plenty of fluids. Getting in plenty of protein through food intake. Eating one protein bar daily. No carbonation. Eating sweets on occasion. Tolerating post-op diet. No problems with bowel movements. No nausea. No emesis. No GERD/ Reflux. Denies CP/SOB. No Dizziness. No abdominal pain. Taking daily MV BID. Taking one Calcium daily. Depression stable with medication. Has not repeated Vit D level- has order, enocouraged to complete. Has been staying active. Walking outside several times per week. Continues to get in 10k steps daily. Physical Activity:  Walking / 10k steps daily. Current BMI: Body mass index is 26.68 kg/m².   Current Weight:   Wt Readings from Last 3 Encounters:   06/10/20 205 lb (93 kg)   12/10/19 206 lb (93.4 kg)   06/12/19 204 lb 3.2 oz (92.6 kg)     Pre-op Body Weight: 284      Past Medical History:  Past Medical History:   Diagnosis Date    Chronic back pain        Past Surgical History:  Past Surgical History:   Procedure Laterality Date    ENDOSCOPY, COLON, DIAGNOSTIC      LYMPHADENECTOMY      1978    SLEEVE GASTRECTOMY N/A 12/11/2017    ROBOTIC GASTRECTOMY SLEEVE performed by Anson Blankenship MD at [address] [facility] EXTRACTION         Past Social History:  Social History     Socioeconomic History    Marital status: 1.25 MG (13888 UT) CAPS Take 1 capsule by mouth once a week (Patient not taking: Reported on 6/10/2020) 4 capsule 2    Cyanocobalamin (VITAMIN B-12 SL) Place 1 tablet under the tongue daily       No current facility-administered medications for this visit. Allergies:   No Known Allergies    Subjective:    Constitutional: Denies any fever, chills, fatigue. Wound: Denies any rash, skin color changes or wound problems. Resp: Denies any cough, shortness of breath. CV: Denies any chest pain, orthopnea or syncope. MS: Denies myalgias, arthralgias. GI: Denies any nausea, vomiting, diarrhea, constipation, melena, hematochezia. No incisional discomfort. : Denies any hematuria, hesitancy or dysuria. NEURO: Denies seizures, headache. Objective:    /74 (Site: Right Upper Arm, Position: Sitting, Cuff Size: Large Adult)   Pulse 60   Temp 97.9 °F (36.6 °C) (Infrared)   Ht 6' 1.5\" (1.867 m)   Wt 205 lb (93 kg)   BMI 26.68 kg/m²   Physical Examination:   Constitutional: Alert and oriented to person, place and time. Well-developed, well- nourished. Head: Normocephalic and atraumatic  Neck: Supple. Eyes: EOMI b/l. Conjunctivae normal.  No scleral icterus. Respiratory: Effort normal. No respiratory distress. Abd: Benign  Ext:  Movement x 4. No edema  Skin; Warm and dry, no visible rashes, lesions or ulcers.    Neuro: Cranial Nerves Grossly Intact; nml coordination        Labs:  CBC   Lab Results   Component Value Date    WBC 4.5 12/07/2019    RBC 4.74 12/07/2019    HGB 14.9 12/07/2019    HCT 46.3 12/07/2019    MCV 97.7 12/07/2019    MCH 31.4 12/07/2019    MCHC 32.2 12/07/2019    RDW 12.8 07/12/2018     12/07/2019    MPV 11.5 12/07/2019    RBCMORP NORMAL 05/01/2017    SEGSPCT 51.8 12/07/2019    LABLYMP 39.1 12/07/2019    MONOPCT 7.3 12/07/2019    MONOPCT 8.4 07/12/2018    LABEOS 1.1 12/07/2019    BASO 0.7 12/07/2019    NRBC 0 12/07/2019    SEGSABS 2.3 12/07/2019    LYMPHSABS 1.8 12/07/2019

## 2020-06-19 RX ORDER — TIZANIDINE 4 MG/1
4 TABLET ORAL 3 TIMES DAILY PRN
Qty: 30 TABLET | Refills: 3 | Status: SHIPPED | OUTPATIENT
Start: 2020-06-19 | End: 2020-09-11

## 2020-07-20 ENCOUNTER — PATIENT MESSAGE (OUTPATIENT)
Dept: FAMILY MEDICINE CLINIC | Age: 45
End: 2020-07-20

## 2020-07-20 NOTE — TELEPHONE ENCOUNTER
Urbano Luz called back to make an appt with Dr Krysten Mcconnell, per Dr Juanita Hendrickson request, no openings this wk except same days, and Urbano Luz would like an appt ASAP.

## 2020-07-20 NOTE — TELEPHONE ENCOUNTER
From: Laron Johnson Parent  To: Wilian Lewis DO  Sent: 7/20/2020 3:34 PM EDT  Subject: Non-Urgent Medical Question    Would it be possible for you to refer me to Dr. Patricia Olvera at [address] [place] [address]? I feel like I'm spiraling downward. I have few doubts that the dizziness is a symptom of anxiety (at least). Thanks.

## 2020-07-21 ENCOUNTER — HOSPITAL ENCOUNTER (OUTPATIENT)
Age: 45
Discharge: HOME | End: 2020-07-21
Payer: COMMERCIAL

## 2020-07-21 DIAGNOSIS — Z98.84 S/P LAPAROSCOPIC SLEEVE GASTRECTOMY: ICD-10-CM

## 2020-07-21 DIAGNOSIS — Z13.21 SCREENING FOR MALNUTRITION: ICD-10-CM

## 2020-07-21 DIAGNOSIS — E66.3 OVERWEIGHT (BMI 25.0-29.9): ICD-10-CM

## 2020-07-21 DIAGNOSIS — K91.2 POSTSURGICAL MALABSORPTION: ICD-10-CM

## 2020-07-21 PROCEDURE — 36415 COLL VENOUS BLD VENIPUNCTURE: CPT

## 2020-07-21 PROCEDURE — 82306 VITAMIN D 25 HYDROXY: CPT

## 2020-07-22 LAB — VITAMIN D 25-HYDROXY: 21 NG/ML (ref 30–100)

## 2020-07-23 ENCOUNTER — NURSE ONLY (OUTPATIENT)
Dept: LAB | Age: 45
End: 2020-07-23

## 2020-07-23 ENCOUNTER — OFFICE VISIT (OUTPATIENT)
Dept: FAMILY MEDICINE CLINIC | Age: 45
End: 2020-07-23
Payer: COMMERCIAL

## 2020-07-23 ENCOUNTER — TELEPHONE (OUTPATIENT)
Dept: BARIATRICS/WEIGHT MGMT | Age: 45
End: 2020-07-23

## 2020-07-23 VITALS
RESPIRATION RATE: 14 BRPM | TEMPERATURE: 98.3 F | HEIGHT: 74 IN | DIASTOLIC BLOOD PRESSURE: 82 MMHG | WEIGHT: 210.2 LBS | SYSTOLIC BLOOD PRESSURE: 136 MMHG | HEART RATE: 52 BPM | OXYGEN SATURATION: 99 % | BODY MASS INDEX: 26.98 KG/M2

## 2020-07-23 PROBLEM — E11.9 CONTROLLED TYPE 2 DIABETES MELLITUS WITHOUT COMPLICATION, WITHOUT LONG-TERM CURRENT USE OF INSULIN (HCC): Status: ACTIVE | Noted: 2020-07-23

## 2020-07-23 PROBLEM — E66.01 SEVERE OBESITY (BMI 35.0-39.9) WITH COMORBIDITY (HCC): Status: ACTIVE | Noted: 2020-07-23

## 2020-07-23 LAB
ANION GAP SERPL CALCULATED.3IONS-SCNC: 9 MEQ/L (ref 8–16)
BASOPHILS # BLD: 0.4 %
BASOPHILS ABSOLUTE: 0 THOU/MM3 (ref 0–0.1)
BUN BLDV-MCNC: 9 MG/DL (ref 7–22)
CALCIUM SERPL-MCNC: 9.8 MG/DL (ref 8.5–10.5)
CHLORIDE BLD-SCNC: 102 MEQ/L (ref 98–111)
CO2: 27 MEQ/L (ref 23–33)
CREAT SERPL-MCNC: 0.8 MG/DL (ref 0.4–1.2)
EOSINOPHIL # BLD: 1.2 %
EOSINOPHILS ABSOLUTE: 0.1 THOU/MM3 (ref 0–0.4)
ERYTHROCYTE [DISTWIDTH] IN BLOOD BY AUTOMATED COUNT: 12.2 % (ref 11.5–14.5)
ERYTHROCYTE [DISTWIDTH] IN BLOOD BY AUTOMATED COUNT: 42.2 FL (ref 35–45)
GFR SERPL CREATININE-BSD FRML MDRD: > 90 ML/MIN/1.73M2
GLUCOSE BLD-MCNC: 91 MG/DL (ref 70–108)
HCT VFR BLD CALC: 44.4 % (ref 42–52)
HEMOGLOBIN: 14.6 GM/DL (ref 14–18)
IMMATURE GRANS (ABS): 0.01 THOU/MM3 (ref 0–0.07)
IMMATURE GRANULOCYTES: 0.2 %
LYMPHOCYTES # BLD: 44 %
LYMPHOCYTES ABSOLUTE: 2.5 THOU/MM3 (ref 1–4.8)
MCH RBC QN AUTO: 30.7 PG (ref 26–33)
MCHC RBC AUTO-ENTMCNC: 32.9 GM/DL (ref 32.2–35.5)
MCV RBC AUTO: 93.3 FL (ref 80–94)
MONOCYTES # BLD: 8.6 %
MONOCYTES ABSOLUTE: 0.5 THOU/MM3 (ref 0.4–1.3)
NUCLEATED RED BLOOD CELLS: 0 /100 WBC
PLATELET # BLD: 136 THOU/MM3 (ref 130–400)
PMV BLD AUTO: 11.4 FL (ref 9.4–12.4)
POTASSIUM SERPL-SCNC: 3.8 MEQ/L (ref 3.5–5.2)
RBC # BLD: 4.76 MILL/MM3 (ref 4.7–6.1)
SEG NEUTROPHILS: 45.6 %
SEGMENTED NEUTROPHILS ABSOLUTE COUNT: 2.6 THOU/MM3 (ref 1.8–7.7)
SODIUM BLD-SCNC: 138 MEQ/L (ref 135–145)
TSH SERPL DL<=0.05 MIU/L-ACNC: 3.15 UIU/ML (ref 0.4–4.2)
WBC # BLD: 5.6 THOU/MM3 (ref 4.8–10.8)

## 2020-07-23 PROCEDURE — 99214 OFFICE O/P EST MOD 30 MIN: CPT | Performed by: FAMILY MEDICINE

## 2020-07-23 PROCEDURE — G8417 CALC BMI ABV UP PARAM F/U: HCPCS | Performed by: FAMILY MEDICINE

## 2020-07-23 PROCEDURE — 3046F HEMOGLOBIN A1C LEVEL >9.0%: CPT | Performed by: FAMILY MEDICINE

## 2020-07-23 PROCEDURE — G8427 DOCREV CUR MEDS BY ELIG CLIN: HCPCS | Performed by: FAMILY MEDICINE

## 2020-07-23 PROCEDURE — 2022F DILAT RTA XM EVC RTNOPTHY: CPT | Performed by: FAMILY MEDICINE

## 2020-07-23 PROCEDURE — 1036F TOBACCO NON-USER: CPT | Performed by: FAMILY MEDICINE

## 2020-07-23 RX ORDER — ERGOCALCIFEROL 1.25 MG/1
50000 CAPSULE ORAL WEEKLY
Qty: 4 CAPSULE | Refills: 1 | Status: SHIPPED | OUTPATIENT
Start: 2020-07-23 | End: 2020-07-23 | Stop reason: ALTCHOICE

## 2020-07-23 RX ORDER — ERGOCALCIFEROL 1.25 MG/1
50000 CAPSULE ORAL WEEKLY
Qty: 4 CAPSULE | Refills: 2 | Status: SHIPPED | OUTPATIENT
Start: 2020-07-23 | End: 2020-09-11

## 2020-07-23 RX ORDER — FLUOXETINE HYDROCHLORIDE 40 MG/1
80 CAPSULE ORAL DAILY
Qty: 60 CAPSULE | Refills: 5 | Status: SHIPPED | OUTPATIENT
Start: 2020-07-23 | End: 2020-12-08

## 2020-07-23 NOTE — TELEPHONE ENCOUNTER
Called patient and let him know of abnormal Vitamin D level. Script sent to [facility] to be taken for 12 weeks, then re-check. Patient voiced understanding.
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
## 2020-08-23 ENCOUNTER — PATIENT MESSAGE (OUTPATIENT)
Dept: FAMILY MEDICINE CLINIC | Age: 45
End: 2020-08-23

## 2020-08-24 ENCOUNTER — TELEPHONE (OUTPATIENT)
Dept: FAMILY MEDICINE CLINIC | Age: 45
End: 2020-08-24

## 2020-08-24 NOTE — TELEPHONE ENCOUNTER
----- Message from Roxy Blanco DO sent at 8/22/2020 12:43 PM EDT -----  Please contact patient and see if his mood is doing better with the prozac.     Cristian  ----- Message -----  From: Roxy Blanco DO  Sent: 8/22/2020  To: Roxy Blanco DO    Call re MDD and Prozac increase

## 2020-08-24 NOTE — TELEPHONE ENCOUNTER
Patient states that the mood is better.   Patient has been informed of Mychart response as well and voiced understanding

## 2020-09-11 ENCOUNTER — APPOINTMENT (OUTPATIENT)
Dept: CT IMAGING | Age: 45
End: 2020-09-11
Payer: COMMERCIAL

## 2020-09-11 ENCOUNTER — HOSPITAL ENCOUNTER (EMERGENCY)
Age: 45
Discharge: HOME | End: 2020-09-11
Attending: EMERGENCY MEDICINE
Payer: COMMERCIAL

## 2020-09-11 VITALS
HEART RATE: 58 BPM | TEMPERATURE: 98.2 F | DIASTOLIC BLOOD PRESSURE: 86 MMHG | RESPIRATION RATE: 17 BRPM | SYSTOLIC BLOOD PRESSURE: 145 MMHG | OXYGEN SATURATION: 98 %

## 2020-09-11 LAB
ANION GAP SERPL CALCULATED.3IONS-SCNC: 10 MEQ/L (ref 8–16)
BASOPHILS # BLD: 0.8 %
BASOPHILS ABSOLUTE: 0 THOU/MM3 (ref 0–0.1)
BUN BLDV-MCNC: 11 MG/DL (ref 7–22)
CALCIUM SERPL-MCNC: 9.5 MG/DL (ref 8.5–10.5)
CHLORIDE BLD-SCNC: 103 MEQ/L (ref 98–111)
CO2: 27 MEQ/L (ref 23–33)
CREAT SERPL-MCNC: 0.7 MG/DL (ref 0.4–1.2)
EKG ATRIAL RATE: 45 BPM
EKG P AXIS: 73 DEGREES
EKG P-R INTERVAL: 134 MS
EKG Q-T INTERVAL: 458 MS
EKG QRS DURATION: 88 MS
EKG QTC CALCULATION (BAZETT): 396 MS
EKG R AXIS: 59 DEGREES
EKG T AXIS: 57 DEGREES
EKG VENTRICULAR RATE: 45 BPM
EOSINOPHIL # BLD: 0.8 %
EOSINOPHILS ABSOLUTE: 0 THOU/MM3 (ref 0–0.4)
ERYTHROCYTE [DISTWIDTH] IN BLOOD BY AUTOMATED COUNT: 12.1 % (ref 11.5–14.5)
ERYTHROCYTE [DISTWIDTH] IN BLOOD BY AUTOMATED COUNT: 42.1 FL (ref 35–45)
GFR SERPL CREATININE-BSD FRML MDRD: > 90 ML/MIN/1.73M2
GLUCOSE BLD-MCNC: 92 MG/DL (ref 70–108)
HCT VFR BLD CALC: 43.4 % (ref 42–52)
HEMOGLOBIN: 14.7 GM/DL (ref 14–18)
IMMATURE GRANS (ABS): 0.01 THOU/MM3 (ref 0–0.07)
IMMATURE GRANULOCYTES: 0.2 %
LIPASE: 34.8 U/L (ref 5.6–51.3)
LYMPHOCYTES # BLD: 36.8 %
LYMPHOCYTES ABSOLUTE: 1.8 THOU/MM3 (ref 1–4.8)
MCH RBC QN AUTO: 32 PG (ref 26–33)
MCHC RBC AUTO-ENTMCNC: 33.9 GM/DL (ref 32.2–35.5)
MCV RBC AUTO: 94.6 FL (ref 80–94)
MONOCYTES # BLD: 9.3 %
MONOCYTES ABSOLUTE: 0.4 THOU/MM3 (ref 0.4–1.3)
NUCLEATED RED BLOOD CELLS: 0 /100 WBC
OSMOLALITY CALCULATION: 278.4 MOSMOL/KG (ref 275–300)
PLATELET # BLD: 129 THOU/MM3 (ref 130–400)
PMV BLD AUTO: 10.9 FL (ref 9.4–12.4)
POTASSIUM SERPL-SCNC: 4.3 MEQ/L (ref 3.5–5.2)
PRO-BNP: 38.4 PG/ML (ref 0–450)
RBC # BLD: 4.59 MILL/MM3 (ref 4.7–6.1)
SEG NEUTROPHILS: 52.1 %
SEGMENTED NEUTROPHILS ABSOLUTE COUNT: 2.5 THOU/MM3 (ref 1.8–7.7)
SODIUM BLD-SCNC: 140 MEQ/L (ref 135–145)
TROPONIN T: < 0.01 NG/ML
WBC # BLD: 4.8 THOU/MM3 (ref 4.8–10.8)

## 2020-09-11 PROCEDURE — 83880 ASSAY OF NATRIURETIC PEPTIDE: CPT

## 2020-09-11 PROCEDURE — 83690 ASSAY OF LIPASE: CPT

## 2020-09-11 PROCEDURE — 70450 CT HEAD/BRAIN W/O DYE: CPT

## 2020-09-11 PROCEDURE — 99213 OFFICE O/P EST LOW 20 MIN: CPT | Performed by: PSYCHIATRY & NEUROLOGY

## 2020-09-11 PROCEDURE — 96374 THER/PROPH/DIAG INJ IV PUSH: CPT

## 2020-09-11 PROCEDURE — 6370000000 HC RX 637 (ALT 250 FOR IP): Performed by: EMERGENCY MEDICINE

## 2020-09-11 PROCEDURE — 70496 CT ANGIOGRAPHY HEAD: CPT

## 2020-09-11 PROCEDURE — 6360000004 HC RX CONTRAST MEDICATION: Performed by: EMERGENCY MEDICINE

## 2020-09-11 PROCEDURE — 80048 BASIC METABOLIC PNL TOTAL CA: CPT

## 2020-09-11 PROCEDURE — 70498 CT ANGIOGRAPHY NECK: CPT

## 2020-09-11 PROCEDURE — 85025 COMPLETE CBC W/AUTO DIFF WBC: CPT

## 2020-09-11 PROCEDURE — 36415 COLL VENOUS BLD VENIPUNCTURE: CPT

## 2020-09-11 PROCEDURE — 99285 EMERGENCY DEPT VISIT HI MDM: CPT

## 2020-09-11 PROCEDURE — 6360000002 HC RX W HCPCS: Performed by: EMERGENCY MEDICINE

## 2020-09-11 PROCEDURE — 84484 ASSAY OF TROPONIN QUANT: CPT

## 2020-09-11 PROCEDURE — 93010 ELECTROCARDIOGRAM REPORT: CPT | Performed by: NUCLEAR MEDICINE

## 2020-09-11 PROCEDURE — 93005 ELECTROCARDIOGRAM TRACING: CPT | Performed by: FAMILY MEDICINE

## 2020-09-11 RX ORDER — ASPIRIN 81 MG/1
81 TABLET ORAL DAILY
Qty: 30 TABLET | Refills: 1 | Status: SHIPPED | OUTPATIENT
Start: 2020-09-11

## 2020-09-11 RX ORDER — ASPIRIN 81 MG/1
324 TABLET, CHEWABLE ORAL ONCE
Status: COMPLETED | OUTPATIENT
Start: 2020-09-11 | End: 2020-09-11

## 2020-09-11 RX ORDER — LORAZEPAM 2 MG/ML
0.5 INJECTION INTRAMUSCULAR ONCE
Status: COMPLETED | OUTPATIENT
Start: 2020-09-11 | End: 2020-09-11

## 2020-09-11 RX ORDER — MECLIZINE HYDROCHLORIDE 25 MG/1
25 TABLET ORAL 3 TIMES DAILY PRN
Qty: 21 TABLET | Refills: 0 | Status: SHIPPED | OUTPATIENT
Start: 2020-09-11 | End: 2020-09-18

## 2020-09-11 RX ORDER — MECLIZINE HYDROCHLORIDE CHEWABLE TABLETS 25 MG/1
25 TABLET, CHEWABLE ORAL ONCE
Status: COMPLETED | OUTPATIENT
Start: 2020-09-11 | End: 2020-09-11

## 2020-09-11 RX ADMIN — MECLIZINE HCL 25 MG: 25 TABLET, CHEWABLE ORAL at 12:04

## 2020-09-11 RX ADMIN — ASPIRIN 324 MG: 81 TABLET, CHEWABLE ORAL at 12:47

## 2020-09-11 RX ADMIN — LORAZEPAM 0.5 MG: 2 INJECTION INTRAMUSCULAR; INTRAVENOUS at 13:02

## 2020-09-11 RX ADMIN — IOPAMIDOL 80 ML: 755 INJECTION, SOLUTION INTRAVENOUS at 11:53

## 2020-09-11 ASSESSMENT — ENCOUNTER SYMPTOMS
ABDOMINAL PAIN: 0
NAUSEA: 0
SHORTNESS OF BREATH: 0
DIARRHEA: 0
COLOR CHANGE: 0

## 2020-09-11 NOTE — ED NOTES
Pt reports he is not dizzy at this time following medication administration      Johnathon Li, RN  09/11/20 0356

## 2020-09-11 NOTE — ED NOTES
In to assess pt. Pt resting on cot at this time. Pt alert and oriented x4. Dr Arthuro Bumpers at bedside for pt assessment.       Jose J Castellon RN  09/11/20 0132

## 2020-09-11 NOTE — ED NOTES
ED nurse-to-nurse bedside report    Chief Complaint   Patient presents with    Dizziness    Chest Pain      LOC: alert and orientated to name, place, date  Vital signs   Vitals:    09/11/20 1034   BP: (!) 159/104   Pulse: 50   Resp: 20   Temp: 98.2 °F (36.8 °C)   TempSrc: Oral   SpO2: 100%      Pain:    Pain Interventions: none  Pain Goal: 0  Oxygen: No    Current needs required none   Telemetry: Yes  LDAs:   Peripheral IV 09/11/20 Left Antecubital (Active)   Site Assessment Clean; Intact;Dry 09/11/20 1043   Dressing Status Clean;Dry; Intact 09/11/20 1043     Continuous Infusions:   Mobility: Independent  [place] Fall Risk Score: No flowsheet data found. Fall Interventions: none  Report given to:  Mitzie Dance, RN  09/11/20 1100

## 2020-09-11 NOTE — VIRTUAL HEALTH
[facility] Consult for  [facility] Stroke Alert through [address] @ 11:23  2020 3:28 PM  Pt Name: Mary Kay Woodward  MRN: [id number]  YOB: 1975  Date of evaluation: 2020  Primary Care Physician: Jo Grimes DO  Reason for Evaluation: Stroke Evaluation with Discussion with Ed or primary team with Telemedicine and stroke evaluation with Review of imaging and labs    Mary Kay Woodward is a 39 y.o. male who presents with concern of dizziness since the night before. And occ chest pain, able to walk, no issues with extremity coordination, worsening with some movement. Improved after meclizine    Ct head no hemorrhage  cta head and neck no stenosis, dissection or occlusion    Allergies  has No Known Allergies. Medications  Prior to Admission medications    Medication Sig Start Date End Date Taking? Authorizing Provider   meclizine (ANTIVERT) 25 MG tablet Take 1 tablet by mouth 3 times daily as needed for Dizziness 20 Yes Lona Aguilera DO   aspirin EC 81 MG EC tablet Take 1 tablet by mouth daily 20  Yes Lona Aguilera DO   FLUoxetine (PROZAC) 40 MG capsule Take 2 capsules by mouth daily 20   Jo Grimes DO    Scheduled Meds:  Continuous Infusions:  PRN Meds:.  Past Medical History   has a past medical history of Chronic back pain.   Social History  Social History     Socioeconomic History    Marital status:      Spouse name: Not on file    Number of children: Not on file    Years of education: Not on file    Highest education level: Not on file   Occupational History    Not on file   Social Needs    Financial resource strain: Not on file    Food insecurity     Worry: Not on file     Inability: Not on file    Transportation needs     Medical: Not on file     Non-medical: Not on file   Tobacco Use    Smoking status: Former Smoker     Last attempt to quit:      Years since quittin.7    Smokeless tobacco: Former User Types: Chew   Substance and Sexual Activity    Alcohol use: No    Drug use: No    Sexual activity: Not on file   Lifestyle    Physical activity     Days per week: Not on file     Minutes per session: Not on file    Stress: Not on file   Relationships    Social connections     Talks on phone: Not on file     Gets together: Not on file     Attends Rastafarian service: Not on file     Active member of club or organization: Not on file     Attends meetings of clubs or organizations: Not on file     Relationship status: Not on file    Intimate partner violence     Fear of current or ex partner: Not on file     Emotionally abused: Not on file     Physically abused: Not on file     Forced sexual activity: Not on file   Other Topics Concern    Not on file   Social History Narrative    Not on file     Family History      Problem Relation Age of Onset    Diabetes Mother     Thyroid Disease Mother     Obesity Mother     Obesity Father     Diabetes Sister     Obesity Sister     Diabetes Maternal Grandmother     Obesity Maternal Grandmother     Cancer Paternal Grandmother     Heart Disease Paternal Grandfather        OBJECTIVE  BP (!) 145/86   Pulse 58   Temp 98.2 °F (36.8 °C) (Oral)   Resp 17   SpO2 98%     NIH Stroke Scale  Interval: Baseline  Level of Consciousness (1a. ): Alert  LOC Questions (1b. ):  Answers both correctly  LOC Commands (1c. ): Performs both tasks correctly  Best Gaze (2. ): Normal  Visual (3. ): No visual loss  Facial Palsy (4. ): Normal symmetrical movement  Motor Arm, Left (5a. ): No drift  Motor Arm, Right (5b. ): No drift  Motor Leg, Left (6a. ): No drift  Motor Leg, Right (6b. ): No drift  Limb Ataxia (7. ): Absent  Sensory (8. ): (!) Mild to Moderate  Best Language (9. ): No aphasia  Dysarthria (10. ): Normal  Extinction and Inattention (11): No abnormality  Total: 1  Pre-Morbid mRS: 0    Imaging:  Images were personally reviewed including:  CT brain without contrast: no hemorrhage  CTA imaging: no significant stenosis or occlusion    Assessment    39 y.o. male who presents with concern of dizziness since the night before. And occ chest pain, able to walk, no issues with extremity coordination, worsening with some movement. Differential DDx:  1. Bppv, vs tia vs vestibulitis      Recommendations:  1. NIH 0  2. Recommend Outpatient Neurology Consult for further assessment and evaluation   3. Consider aspirin daily until followup with neurology  4. Meclizine prn  5. If new deficits, weakness, vision change, discoordination may obtain mri brain  6. Fluids as dye received for cta    Discussed with ED Physician    At least 35 min of Telemedicine and time in conversation directly with ED staff and physician for the patient who is in imminent and life threatening deterioration without further treatment and evaluation. This Virtual Visit was conducted with patient's (and/or legal guardian's) consent, to provide telestroke consultation and necessary medical care.   Time spent examining patient, reviewing the images personally, reviewing the chart, perform high complexity decision making and speaking with the nursing staff regarding recommendations          Rashaad Dailey MD   [place], [place]  Stroke, Neurocritical Care And/or [address] [place] [address] Stroke [id number]  Electronically signed 9/11/2020 at 3:28 PM

## 2020-09-11 NOTE — ED PROVIDER NOTES
[facility] ENCOUNTER          Pt Name: Marc Woodward  MRN: [id number]  [place] 1975  Date of evaluation: 9/11/2020  Emergency Physician: Christine Clark, [facility]       Chief Complaint   Patient presents with    Dizziness    Chest Pain     History obtained from the patient. HISTORY OF PRESENT ILLNESS    HPI  Marc Woodward is a 39 y.o. male who presents to the emergency department for evaluation of dizziness. Patient states he woke up this morning at approximately 625 with dizziness. He states he noticed it when he put his head forward to the left. He states it is better lying back and not moving. Dizziness is described as room spinning. He has been seen by his family doctor for this prior and his Prozac was adjusted. 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 She stated he should go to the ER to get checked out for stroke. He states that he has had some tingling in his face, arm and leg. No  He also reports a burning sensation in his epigastric chest that has been ongoing for the last 2 weeks. He only notices that at night when he lays down flat. currently resolved   The patient has no other acute complaints at this time. REVIEW OF SYSTEMS   Review of Systems   Constitutional: Negative for chills and fever. Respiratory: Negative for shortness of breath. Cardiovascular: Negative for chest pain and palpitations. Gastrointestinal: Negative for abdominal pain, diarrhea and nausea. Skin: Negative for color change and rash. Neurological: Positive for dizziness and numbness. Negative for weakness. Hematological: Negative for adenopathy. Does not bruise/bleed easily.          PAST MEDICAL AND SURGICAL HISTORY     Past Medical History:   Diagnosis Date    Chronic back pain      Past Surgical History:   Procedure Laterality Date    ENDOSCOPY, COLON, DIAGNOSTIC      LYMPHADENECTOMY General: Lids are normal. No visual field deficit. Extraocular Movements: Extraocular movements intact. Right eye: Normal extraocular motion and no nystagmus. Left eye: Normal extraocular motion and no nystagmus. Conjunctiva/sclera: Conjunctivae normal.      Pupils: Pupils are equal, round, and reactive to light. Visual Fields: Right eye visual fields normal and left eye visual fields normal.   Neck:      Musculoskeletal: Normal range of motion and neck supple. Vascular: No JVD. Cardiovascular:      Rate and Rhythm: Normal rate and regular rhythm. Heart sounds: Normal heart sounds. Pulmonary:      Effort: Pulmonary effort is normal.      Breath sounds: Normal breath sounds. Abdominal:      General: Bowel sounds are normal. There is no distension. Palpations: Abdomen is soft. Tenderness: There is no abdominal tenderness. Musculoskeletal: Normal range of motion. General: No tenderness or deformity. Comments: Destiny's negative bilateral Lower extremities    Skin:     General: Skin is warm and dry. Capillary Refill: Capillary refill takes less than 2 seconds. Neurological:      Mental Status: He is alert and oriented to person, place, and time. GCS: GCS eye subscore is 4. GCS verbal subscore is 5. GCS motor subscore is 6. Cranial Nerves: Cranial nerves are intact. No cranial nerve deficit, dysarthria or facial asymmetry. Sensory: Sensation is intact. No sensory deficit. Motor: Motor function is intact. No weakness, tremor or abnormal muscle tone. Coordination: Coordination is intact. Finger-Nose-Finger Test and Heel to [facility] Test normal. Rapid alternating movements normal.      Gait: Gait is intact. Gait normal.      Comments: Patient with a variable exam.  At one point his left arm had decreased sensation and then his right leg and then his right arm and in his left leg. He reported decreased sensation to the left face. He was sensate to light pinprick in the upper and lower extremities bilaterally    NIH 2 given possible sensory deficit    Patient hence exam is negative. Initial vital signs and nursing assessment reviewed and normal. Pulsoximetry is normal per my interpretation. MEDICAL DECISION MAKING   Initial Assessment: Patient presented for evaluation of vertigo, chest burning at night, paresthesias. The vitals signs and physical exam were notable for patient with sinus bradycardia normal blood pressure, variable physical exam.  Vertigo. No vision changes no focal weakness no speech changes. Given these findings the emergent condition that needed addressed/further defined were CVA, peripheral vertigo, anxiety, GERD,. Given the patient well-appearing afebrile, negative hints exam, slightly positional symptoms. I think ultimately the risk of CVA ACS dissection thromboembolic disease is low  Plan: To further define disposition and risk stratification will obtain CBC, BMP, ECG, CTA head, head CT, neck CTA,, troponin. ED RESULTS   Laboratory results:  Labs Reviewed   CBC WITH AUTO DIFFERENTIAL - Abnormal; Notable for the following components:       Result Value    RBC 4.59 (*)     MCV 94.6 (*)     Platelets 921 (*)     All other components within normal limits   BASIC METABOLIC PANEL   TROPONIN   LIPASE   BRAIN NATRIURETIC PEPTIDE   ANION GAP   GLOMERULAR FILTRATION RATE, ESTIMATED   OSMOLALITY   POCT GLUCOSE       Radiologic studies results:  CTA NECK W WO CONTRAST (CODE STROKE NIHSS 4 or Above)   Final Result       1. No flow-limiting stenosis or aneurysm in the intracranial circulation. 2. Minimal mural plaque at the carotid bulbs without hemodynamically significant stenosis by NASCET criteria. **This report has been created using voice recognition software. It may contain minor errors which are inherent in voice recognition technology. **      Final report electronically signed by  Emmanuel Farrell MD on 9/11/2020 12:13 PM      CTA HEAD W WO CONTRAST   Final Result       1. No flow-limiting stenosis or aneurysm in the intracranial circulation. 2. Minimal mural plaque at the carotid bulbs without hemodynamically significant stenosis by NASCET criteria. **This report has been created using voice recognition software. It may contain minor errors which are inherent in voice recognition technology. **      Final report electronically signed by Dr. Emmanuel Farrell MD on 9/11/2020 12:13 PM      CT Head WO Contrast   Final Result   1. No acute intracranial abnormality. **This report has been created using voice recognition software. It may contain minor errors which are inherent in voice recognition technology. **         Final report electronically signed by Dr. Pool Willis on 9/11/2020 11:47 AM          ED Medications administered this visit:   Medications   meclizine (ANTIVERT) chewable tablet 25 mg (25 mg Oral Given 9/11/20 1204)   iopamidol (ISOVUE-370) 76 % injection 80 mL (80 mLs Intravenous Given 9/11/20 1153)   aspirin chewable tablet 324 mg (324 mg Oral Given 9/11/20 1247)   LORazepam (ATIVAN) injection 0.5 mg (0.5 mg Intravenous Given 9/11/20 1302)         ED COURSE     ED Course as of Sep 11 1653   Fri Sep 11, 2020   1342 Patient up and ambulating without difficulty. Dizziness and paresthesia resolved. Discussed case with Dr. Hailee Cuevas. Will update patient to prefer follow-up vs Mri now    [DD]   1450 EKG with sinus bradycardia 45 no acute ST-T wave changes    [DD]   1451 Patient symptoms resolved states he feels much better. He is comfortable following up with Dr. Candice Arguelles and neurology as an outpatient    [DD]      ED Course User Index  [DD] Kaylie Faster, DO         The diagnosis, extensive differential diagnosis, laboratory/imaging findings, and return precautions were discussed at the bedside. The patient was an active participant in their care.  They

## 2020-09-11 NOTE — ED NOTES
Pt medicated per order. No significant changes from previous assessment.       Josiah Ayers RN  09/11/20 3614

## 2020-09-11 NOTE — ED TRIAGE NOTES
Presents to ED with c/o dizziness and chest pain. Chest pain has been intermittent for the past 2 weeks. Denies sob. Denies nausea. Reports dizziness that started today. Alert and oriented. Respirations easy and unlabored. EKG complete.

## 2020-09-18 ENCOUNTER — OFFICE VISIT (OUTPATIENT)
Dept: FAMILY MEDICINE CLINIC | Age: 45
End: 2020-09-18
Payer: COMMERCIAL

## 2020-09-18 VITALS
HEART RATE: 60 BPM | RESPIRATION RATE: 16 BRPM | WEIGHT: 208.6 LBS | DIASTOLIC BLOOD PRESSURE: 86 MMHG | SYSTOLIC BLOOD PRESSURE: 132 MMHG | BODY MASS INDEX: 27.65 KG/M2 | TEMPERATURE: 98.1 F | HEIGHT: 73 IN | OXYGEN SATURATION: 98 %

## 2020-09-18 PROCEDURE — 99214 OFFICE O/P EST MOD 30 MIN: CPT | Performed by: NURSE PRACTITIONER

## 2020-09-18 PROCEDURE — G8427 DOCREV CUR MEDS BY ELIG CLIN: HCPCS | Performed by: NURSE PRACTITIONER

## 2020-09-18 PROCEDURE — 1036F TOBACCO NON-USER: CPT | Performed by: NURSE PRACTITIONER

## 2020-09-18 PROCEDURE — 1111F DSCHRG MED/CURRENT MED MERGE: CPT | Performed by: NURSE PRACTITIONER

## 2020-09-18 PROCEDURE — G8417 CALC BMI ABV UP PARAM F/U: HCPCS | Performed by: NURSE PRACTITIONER

## 2020-09-18 NOTE — PROGRESS NOTES
[facility]  [address]/ [facility]  Dept: [phone]  Loc: [phone]  Visit Date: 2020      HPI:   Kristin Woodward is a 39 y.o. male thatpresents for Follow-Up from Hospital (chest pain tightness,  dizziness , had 2 ct scans and heart tests came back okay  thinks may be reflux feeling okay at the [facility] [place] 2] but still feeling some tightness ) and Medication Problem (having some side effects to prozac )      HPI:  Recent episodes of chest pain and tightness, dizziness  Went to ER, work-up was negative  Says ER felt his chest tightness could be r/t acid reflux  He believes this may be the source as well  He's been taking Pepcid Chewables prn with relief  Dizziness continues, although improved some  Only occurs if he closes his eyes and bends forward  Began about 1.5 weeks ago  Continues on Meclizine prn, helps some  Was supposed to see Neuro yesterday but had to reschedule to Oct  No visual changes or headache  No fever or chills  Continues on Prozac  Zyrtec didn't help with prolonged ejaculation  Sees Dr. Nam Conrad on Oct 1st   Waiting to see his recommendations  He comes in alone  History obtained from pt and medical records  I have reviewed the patient's past medical history, past surgical history, allergies,medications, social and family history and I have made updates where appropriate. Past Medical History:   Diagnosis Date    Chronic back pain        Past Surgical History:   Procedure Laterality Date    ENDOSCOPY, COLON, DIAGNOSTIC      LYMPHADENECTOMY          SLEEVE GASTRECTOMY N/A 2017    ROBOTIC GASTRECTOMY SLEEVE performed by Abdiel Burk MD at [address]         Social History     Tobacco Use    Smoking status: Former Smoker     Last attempt to quit: 2011     Years since quittin.7    Smokeless tobacco: Former User     Types: Chew   Substance Use Topics    Alcohol use: No    Drug use:  No Family History   Problem Relation Age of Onset    Diabetes Mother     Thyroid Disease Mother     Obesity Mother     Obesity Father     Diabetes Sister     Obesity Sister     Diabetes Maternal Grandmother     Obesity Maternal Grandmother     Cancer Paternal Grandmother     Heart Disease Paternal Grandfather          Review of Systems  Constitutional: Negative for Fever, Chills, Fatigue  Cardiovascular:  Negative forChest Pain, Palpitations  Respiratory:  Negative for Cough, Wheezing, Shortness of breath  Gastrointestinal:  Nausea, Vomiting, Diarrhea, Constipation, Blood in stool  Genitourinary: - dysuria,Change in frequency, Urgency +prolonged ejaculation  Skin:  Negative for Color change, Rash, Itching, Wound  Psychiatric:  Negative for Suicidal ideation +anxiety, depression  Musculoskeletal:  Negative for Joint pain, Back pain, Gait problems  Neurological:  Negative for Headaches +dizziness        PHYSICAL EXAM:  /86   Pulse 60   Temp 98.1 °F (36.7 °C)   Resp 16   Ht 6' 1\" (1.854 m)   Wt 208 lb 9.6 oz (94.6 kg)   SpO2 98%   BMI 27.52 kg/m²     General Appearance: well developed and well- nourished, in no acute distress  Head: normocephalic and atraumatic  Eyes: pupils equal, round, and reactive to light,  conjunctivae and eye lids without erythema  ENT: external ear and ear canal normal bilaterally, nose without deformity, nasal mucosa and turbinates normal without polyps, oropharynx normal, dentition is normal for age, no lip or gum lesions noted  Neck: supple and non-tender without mass, no thyromegaly or thyroid nodules, no cervical lymphadenopathy  Pulmonary/Chest: clear to auscultation bilaterally- no wheezes, rales or rhonchi, normal air movement, no respiratory distress or retractions  Cardiovascular: normal rate, regular rhythm, normal S1 and S2, no murmurs, rubs, clicks, or gallops,distal pulses intact  Abdomen: soft, non-tender, non-distended, normal bowel sounds,  no rebound or guarding, nomasses or hernias noted, no hepatospleenomegaly  Extremities: no cyanosis, clubbing or edema of the lower extremities  Musculoskeletal: No joint swelling or gross deformity   Neuro:  Alert,  normal speech, no focal findings or movement disorder noted, gait wnl  Psych:  Normal affect without evidence of depression or anxiety, insight and judgement are appropriate, memoryappears intact  Skin: warm and dry, no rash or erythema  Lymph:  No cervical, auricular or supraclavicular lymph nodes palpated    ASSESSMENT & PLAN  Spesner Lemus was seen today for follow-up from hospital and medication problem. Diagnoses and all orders for this visit:    Dizziness  -     MS DISCHARGE MEDS RECONCILED W/ CURRENT OUTPATIENT MED LIST    Moderate episode of recurrent major depressive disorder (HCC)    Chest tightness    Continue Meclizine prn  Follow up with Neuro for further rec  Will see if he can be seen sooner    Seeing Dr. Kim Quan on Oct 1st, awaiting his rec  Continue Prozac until then     Chest tightness improved with Pepcid prn  Continue this  If worsens let us know and we can start something daily. No follow-ups on file. Spenser Lemus received counseling on the following healthy behaviors: nutrition and medication adherence  Reviewedprior labs and health maintenance. Continue current medications, diet and exercise. Discussed use, benefit, and side effects of prescribed medications. Barriers to medication compliance addressed. Patient given educationalmaterials - see patient instructions. All patient questions answered. Patient voiced understanding. Electronically signed by SWAPNIL Barron on 9/18/20 at 2:57 PM EDT     Post-Discharge Transitional Care Management Services or Hospital Follow Up      Gareth Other Parent   YOB: 1975    Date of Office Visit:  9/18/2020  Date of Hospital Admission: 9/11/20  Date of Hospital Discharge: 9/11/20  Risk of hospital readmission (high >=14%. Medium >=10%) : No data recorded    Care management risk score Rising risk (score 2-5) and Complex Care (Scores >=6): 5     Non face to face  following discharge, date last encounter closed (first attempt may have been earlier): *No documented post hospital discharge outreach found in the last 14 days    Call initiated 2 business days of discharge: *No response recorded in the last 14 days    Patient Active Problem List   Diagnosis    Recurrent major depressive disorder, [facility] partial [facility]    Vitamin D deficiency    Depression    Obesity    S/P bariatric surgery    Severe obesity (BMI 35.0-39. 9) with comorbidity ([facility]    Controlled type 2 diabetes mellitus without complication, without long-term current use of insulin (HCC)    Dizziness       No Known Allergies    Medications listed as ordered at the time of discharge from hospital   ParentMemo   Home Medication Instructions DARWIN:    Printed on:09/18/20 3940   Medication Information                      aspirin EC 81 MG EC tablet  Take 1 tablet by mouth daily             FLUoxetine (PROZAC) 40 MG capsule  Take 2 capsules by mouth daily             meclizine (ANTIVERT) 25 MG tablet  Take 1 tablet by mouth 3 times daily as needed for Dizziness                   Medications marked \"taking\" at this time  Outpatient Medications Marked as Taking for the 9/18/20 encounter (Office Visit) with SWAPNIL Grover CNP   Medication Sig Dispense Refill    meclizine (ANTIVERT) 25 MG tablet Take 1 tablet by mouth 3 times daily as needed for Dizziness 21 tablet 0    aspirin EC 81 MG EC tablet Take 1 tablet by mouth daily 30 tablet 1    FLUoxetine (PROZAC) 40 MG capsule Take 2 capsules by mouth daily 60 capsule 5        Medications patient taking as of now reconciled against medications ordered at time of hospital discharge: Yes    Chief Complaint   Patient presents with    Follow-Up from Hospital     chest pain tightness,  dizziness , had 2 ct scans and heart tests came back okay  thinks may be reflux feeling okay at the mment but still feeling some tightness     Medication Problem     having some side effects to prozac        History of Present illness - Follow up of Hospital diagnosis(es): Dizziness, chest tightness and pain    Inpatient course: Discharge summary reviewed- see chart. Interval history/Current status: Recent episodes of chest pain and tightness, dizziness  Went to ER, work-up was negative  Says ER felt his chest tightness could be r/t acid reflux  He believes this may be the source as well  He's been taking Pepcid Chewables prn with relief  Dizziness continues, although improved some  Only occurs if he closes his eyes and bends forward  Began about 1.5 weeks ago  Continues on Meclizine prn, helps some  Was supposed to see Neuro yesterday but had to reschedule to Oct  No visual changes or headache  No fever or chills  Continues on Prozac  Zyrtec didn't help with prolonged ejaculation  Sees Dr. Dalia Main on Oct 1st   Waiting to see his recommendations    A comprehensive review of systems was negative except for what was noted in the HPI. Vitals:    09/18/20 1502   BP: 132/86   Pulse: 60   Resp: 16   Temp: 98.1 °F (36.7 °C)   SpO2: 98%   Weight: 208 lb 9.6 oz (94.6 kg)   Height: 6' 1\" (1.854 m)     Body mass index is 27.52 kg/m².    Wt Readings from Last 3 Encounters:   09/18/20 208 lb 9.6 oz (94.6 kg)   07/23/20 210 lb 3.2 oz (95.3 kg)   06/10/20 205 lb (93 kg)     BP Readings from Last 3 Encounters:   09/18/20 132/86   09/11/20 (!) 145/86   07/23/20 136/82        Physical Exam:  General Appearance: alert and oriented to person, place and time, well developed and well- nourished, in no acute distress  Skin: warm and dry, no rash or erythema  Head: normocephalic and atraumatic  Eyes: pupils equal, round, and reactive to light, extraocular eye movements intact, conjunctivae normal  ENT: tympanic membrane, external ear and ear canal normal bilaterally, nose without

## 2020-09-24 ENCOUNTER — VIRTUAL VISIT (OUTPATIENT)
Dept: NEUROLOGY | Age: 45
End: 2020-09-24
Payer: COMMERCIAL

## 2020-09-24 PROCEDURE — G8428 CUR MEDS NOT DOCUMENT: HCPCS | Performed by: PSYCHIATRY & NEUROLOGY

## 2020-09-24 PROCEDURE — 99204 OFFICE O/P NEW MOD 45 MIN: CPT | Performed by: PSYCHIATRY & NEUROLOGY

## 2020-09-24 NOTE — PATIENT INSTRUCTIONS
1. MRI brain W/WO contrast  2. Referral to physical therapy for vestibular therapy  3. Continue with current medications  4. Follow up in 1 month or sooner if needed. 5. Call if any questions or concerns.

## 2020-09-25 NOTE — PROGRESS NOTES
2020    TELEHEALTH EVALUATION -- Audio/Visual (During CWYAQ-37 public health emergency)    HPI:    Taylor Dumont Parent (:  1975) has requested an audio/video evaluation for the following concern(s): dizziness. He woke up from sleep with dizziness 2 weeks ago. He describes his symptoms as lightheaded, dizzy, and off balance. Symptoms were moderate and constant. Onset is sudden. Modifiers are when he looks down he reports rotational movement of the room in counter clockwise fashion. He has been given antivert which helps. His symptoms are also worse when rolling over in bed. He denies any ringing in his ears, no hearing loss. No family history of hearing aids. He was seen in the ER and CT head done 2020 showed no acute intracranial abnormality. CTA head and neck done 2020 showed No flow-limiting stenosis or aneurysm in the intracranial circulation. Minimal mural plaque at the carotid bulbs without hemodynamically significant stenosis by NASCET criteria. History provided by patient. Review of Systems ______________  Eyes: Negative.  ____  Respiratory: Negative.  ____  Cardiovascular: Negative.  ____  Gastrointestinal: Negative.  ______________________________________      Prior to Visit Medications    Medication Sig Taking?  Authorizing Provider   aspirin EC 81 MG EC tablet Take 1 tablet by mouth daily  Akilah Salinas DO   FLUoxetine (PROZAC) 40 MG capsule Take 2 capsules by mouth daily  Sandhya Fisher, DO       Social History     Tobacco Use    Smoking status: Former Smoker     Last attempt to quit: 2011     Years since quittin.7    Smokeless tobacco: Former User     Types: Chew   Substance Use Topics    Alcohol use: No    Drug use: No        Past Medical History:   Diagnosis Date    Chronic back pain    ,   Past Surgical History:   Procedure Laterality Date    ENDOSCOPY, COLON, DIAGNOSTIC      LYMPHADENECTOMY          SLEEVE GASTRECTOMY N/A 2017    ROBOTIC GASTRECTOMY SLEEVE exanthematous lesions or discoloration noted on facial skin         [] Abnormal-            Psychiatric:       [x] Normal Affect [x] No Hallucinations        [] Abnormal-     Other pertinent observable physical exam findings-   CT head 9/11/2020: reviewed      Impression    1. No acute intracranial abnormality.                   **This report has been created using voice recognition software.  It may contain minor errors which are inherent in voice recognition technology. **              Final report electronically signed by Dr. Devin Knutson on 9/11/2020 11:47 AM        ASSESSMENT/PLAN:  1. Dizziness    This is a 24-year-old male who presents with dizziness that is been ongoing for the last 2 weeks. He reports when he looks down he has rotational movement of the room in a counterclockwise fashion. He was started on Antivert which has helped. His symptoms are also worse when rolling over in bed. He underwent CT of the head as well as CTA head and neck done on 9/11/2020 that showed no concerning findings. His exam appears to be nonfocal.  We will arrange for him to undergo an MRI of the brain with and without contrast to evaluate for organic causes of his symptoms including, but not limited to stroke versus demyelination. We will also arrange for him to undergo formal evaluation with physical therapy for vestibular rehab. After detailed discussion with the patient we agreed to the following plan. Plan:  1. MRI brain W/WO contrast  2. Referral to physical therapy for vestibular therapy  3. Continue with current medications  4. Follow up in 1 month or sooner if needed. 5. Call if any questions or concerns. Gareth Other Parent is a 39 y.o. male being evaluated by a Virtual Visit (video visit) encounter to address concerns as mentioned above. A caregiver was present when appropriate.  Due to this being a TeleHealth encounter (During CUBAP-05 public health emergency), evaluation of the following organ systems was limited: Vitals/Constitutional/EENT/Resp/CV/GI//MS/Neuro/Skin/Heme-Lymph-Imm. Pursuant to the emergency declaration under the [address] [place] [address], [address] [place] [address] and the [employer] Act, this Virtual Visit was conducted with patient's (and/or legal guardian's) consent, to reduce the patient's risk of exposure to COVID-19 and provide necessary medical care. The patient (and/or legal guardian) has also been advised to contact this office for worsening conditions or problems, and seek emergency medical treatment and/or call 911 if deemed necessary. Patient identification was verified at the start of the visit: Yes    Total time spent on this encounter: 54    Services were provided through a video synchronous discussion virtually to substitute for in-person clinic visit. Patient and provider were located at their individual homes. --Vicenta Plunkett MD on 9/24/2020 at 3:02 PM    An electronic signature was used to authenticate this note.

## 2020-09-28 RX ORDER — FLUOXETINE HYDROCHLORIDE 20 MG/1
CAPSULE ORAL
Qty: 11 CAPSULE | Refills: 0 | Status: SHIPPED | OUTPATIENT
Start: 2020-09-28 | End: 2020-12-08

## 2020-09-28 RX ORDER — FLUOXETINE 10 MG/1
CAPSULE ORAL
Qty: 10 CAPSULE | Refills: 0 | Status: SHIPPED | OUTPATIENT
Start: 2020-09-28 | End: 2020-12-08

## 2020-09-29 ENCOUNTER — TELEPHONE (OUTPATIENT)
Dept: FAMILY MEDICINE CLINIC | Age: 45
End: 2020-09-29

## 2020-09-30 NOTE — TELEPHONE ENCOUNTER
Spoke with pt's wife (on HIPAA). They are not wanting to use [facility]. Form was faxed be to [facility] with this information.

## 2020-10-01 ENCOUNTER — HOSPITAL ENCOUNTER (OUTPATIENT)
Dept: PHYSICAL THERAPY | Age: 45
Setting detail: THERAPIES SERIES
Discharge: HOME | End: 2020-10-01
Payer: COMMERCIAL

## 2020-10-01 PROCEDURE — 95992 CANALITH REPOSITIONING PROC: CPT

## 2020-10-01 PROCEDURE — 97161 PT EVAL LOW COMPLEX 20 MIN: CPT

## 2020-10-01 PROCEDURE — 97110 THERAPEUTIC EXERCISES: CPT

## 2020-10-01 NOTE — FLOWSHEET NOTE
** PLEASE SIGN, DATE AND TIME CERTIFICATION BELOW AND RETURN TO [facility] OUTPATIENT REHABILITATION (FAX #: [phone]). ATTEST/CO-SIGN IF ACCESSING VIA [employer]. THANK YOU.**    I certify that I have examined the patient below and determined that Physical Medicine and Rehabilitation service is necessary and that I approve the established plan of care for up to 90 days or as specifically noted. Attestation, signature or co-signature of physician indicates approval of certification requirements.    ________________________ ____________ __________  Physician Signature   Date   Time   [address] [facility]  PHYSICAL THERAPY  [x] EVALUATION  [] DAILY NOTE (LAND) [] DAILY NOTE (AQUATIC ) [] PROGRESS NOTE [] DISCHARGE NOTE    [] [address] [facility]   [x] [address]    [] [address] [facility]   [] Maxine Ramos    Date: 10/1/2020  Patient Name:  Yordy Sanz Parent  : 1975  MRN: [id number]    Referring Practitioner SWAPNIL Matta *   Diagnosis Dizziness and giddiness [R42]    Treatment Diagnosis dizziness   Date of Evaluation 10/1/20   Additional Pertinent History See below      Functional Outcome Measure Used Dizziness handicap inventory   Functional Outcome Score eval score- 28(10/1/20)       Insurance: Primary: Payor: [address] [place] [address] [address] /  /  / ,   Secondary:    Authorization Information: 30 visits per calendar year, modalities covered except ionto/MHP/CP not covered   Visit # 1, 1/10 for progress note   Visits Allowed: 30   Recertification Date:    Physician Follow-Up: 10/26/2020   Physician Orders: PT for dizziness   History of Present Illness: See below     SUBJECTIVE: Gradual dizziness over past x 6 months  Awoke 2 weeks ago with constant dizziness , went to school, nurse sent to ED, ruled out CVA. Given anti-vert at ED, has taken only twice in 2 weeks.   After discharged home the same day, f/u with family MD, referred to neurologist, telehealth visit with Be Carter CNP for neurology. PT ordered PT, ordered MRI of brain and still awaiting authorization. Patient reports dizziness at worst 7/10, reports dizziness rolling to right 3/10, none with rolling to left, dizziness with bending over 7/10  . No dizziness with turning head right and left. Social/Functional History and Current Status:  Medications and Allergies have been reviewed and are listed on Medical History Questionnaire. Patt Solitario Parent lives with spouse in a multiple floor home with ability to complete ADL's on main floor with stairs and no handrail to enter. Task Previous Current   ADLs  Independent Modified Independent- dizziness with bending over to tie shoes 7/10, bending over to shave head 7/10   IADL's Independent Modified Independent   Ambulation Independent Modified Independent   Transfers Independent Modified Independent dizziness sit to stand 5/10, getting into bed 5/10   Recreation Independent Dependent/Unable- has been unable to golf due to dizziness   Community Integration Independent Modified Independent   Driving Active  Active    Work [employer]. Occupation: Teacher at [employer]. OBJECTIVE:  Pain Dizziness currently 2/10,    Palpation    Observation    Posture good        Range of Motion Neck: WFL   Strength cervical isometric good   Coordination WNL   Sensation WNL   Bed Mobility WNL   Transfers WNL   Ambulation Independent  Distance: 100  Surface: Level Tile  Device:No Device  Gait Deviations:   Wide Base of Support and Mild Path Deviations   Balance Not Tested   Special test Negative vertebral artery testing B           TREATMENT   Precautions: none   Pain: Dizziness prior 2/10    X in shaded column indicates activity completed today   Modalities Parameters/  Location  Notes                     Manual Therapy Time/Technique  Notes                     Exercise/Intervention   Notes   REJI boyd campbell pike- moderate dizziness 6/10 and moderate nystagmus   x    R epley maneuver - dizziness upon leaving 0/10   x                  sitting scapular retractions  10  x    Sitting backward shoulder circles 10  x                                         Specific Interventions Next Treatment: BPPV testing and treatment as needed, vestibular exercises prn    Activity/Treatment Tolerance:  [x]  Patient tolerated treatment well  []  Patient limited by fatigue  []  Patient limited by pain   []  Patient limited by medical complications  []  Other:     Assessment: + BPPV R with moderate dizziness and nystagmus. Dizziness subjective appears to be very positional   Body Structures/Functions/Activity Limitations: dizziness  Prognosis: good    GOALS:  Patient Goal: to get my dizziness to go away    Short Term Goals:  Time Frame: deferred to [facility]'s    Long Term Goals:  Time Frame: 5 weeks   1. Patient to report able to get in/out of of bed, roll right and left with no dizziness  2. Patient to report able to bend over to tie shoes with no dizziness  3. Patient to report able to work all day with no dizziness    Patient Education:   [x]  HEP/Education Completed: Plan of Care, Goals, 24 hours BPPV precautions with handout given  []  No new Education completed  []  Reviewed Prior HEP      []  Patient verbalized and/or demonstrated understanding of education provided. []  Patient unable to verbalize and/or demonstrate understanding of education provided. Will continue education. [x]  Barriers to learning: none  PLAN:  Treatment Recommendations: Positioning and Vestibular Rehabilitation    [x]  Plan of care initiated. Plan to see patient 2 times per week for 5 weeks to address the treatment planned outlined above.   Will start with 1 x per day, 2 if needed  []  Continue with current plan of care  []  Modify plan of care as follows:    []  Hold pending physician visit  []  Discharge    Time In 1110   Time Out 1200   Timed Code Minutes: 15 min   Total Treatment Time: 50 min       Electronically Signed by: Rebecca Middleton

## 2020-10-07 ENCOUNTER — HOSPITAL ENCOUNTER (OUTPATIENT)
Dept: PHYSICAL THERAPY | Age: 45
Setting detail: THERAPIES SERIES
Discharge: HOME | End: 2020-10-07
Payer: COMMERCIAL

## 2020-10-07 PROCEDURE — 97110 THERAPEUTIC EXERCISES: CPT

## 2020-10-07 PROCEDURE — 95992 CANALITH REPOSITIONING PROC: CPT

## 2020-10-07 NOTE — FLOWSHEET NOTE
dizziness 5/10 and minimal nystagmus   x    R epley maneuver - dizziness upon leaving 0/10   x                  sitting scapular retractions  15  x    Sitting backward shoulder circles 15  x                                         Specific Interventions Next Treatment: BPPV testing and treatment as needed, vestibular exercises prn    Activity/Treatment Tolerance:  [x]  Patient tolerated treatment well  []  Patient limited by fatigue  []  Patient limited by pain   []  Patient limited by medical complications  []  Other:     Assessment: negative L BPPV, R + boyd campbell pike, improved from last session, feel large problem on R will take a few time to abolish and get all dizziness to go away  Body Structures/Functions/Activity Limitations: dizziness  Prognosis: good    GOALS:  Patient Goal: to get my dizziness to go away    Short Term Goals:  Time Frame: deferred to [facility]'s    Long Term Goals:  Time Frame: 5 weeks   1. Patient to report able to get in/out of of bed, roll right and left with no dizziness  2. Patient to report able to bend over to tie shoes with no dizziness  3. Patient to report able to work all day with no dizziness    Patient Education:   [x]  HEP/Education Completed: Plan of Care, Goals, 24 hours BPPV precautions with handout given  []  No new Education completed  []  Reviewed Prior HEP      []  Patient verbalized and/or demonstrated understanding of education provided. []  Patient unable to verbalize and/or demonstrate understanding of education provided. Will continue education. [x]  Barriers to learning: none  PLAN:  Treatment Recommendations: Positioning and Vestibular Rehabilitation      Plan to see patient 2 times per week for 5 weeks to address the treatment planned outlined above.   Will start with 1 x per day, 2 if needed  []  Continue with current plan of care  []  Modify plan of care as follows:    []  Hold pending physician visit  []  Discharge    Time In 1600   Time Out 1630   Timed Code Minutes: 30 min   Total Treatment Time: 30 min       Electronically Signed by: Leda Hernandez

## 2020-10-11 ENCOUNTER — PATIENT MESSAGE (OUTPATIENT)
Dept: FAMILY MEDICINE CLINIC | Age: 45
End: 2020-10-11

## 2020-10-12 RX ORDER — BUPROPION HYDROCHLORIDE 150 MG/1
150 TABLET ORAL EVERY MORNING
Qty: 30 TABLET | Refills: 3 | Status: SHIPPED | OUTPATIENT
Start: 2020-10-12 | End: 2020-12-08

## 2020-10-12 NOTE — TELEPHONE ENCOUNTER
From: Darrius Woodward  To: Vanessa Reyes DO  Sent: 10/11/2020 2:32 AM EDT  Subject: Visit Follow-Up Question    [email or web address]    Problems are persisting. It's not even delayed at this point; it's just not happening. At all. Since before the first time we talked about it. Maybe one of the options in the above study is worth a try? Let me know what you think.

## 2020-10-15 ENCOUNTER — HOSPITAL ENCOUNTER (OUTPATIENT)
Dept: PHYSICAL THERAPY | Age: 45
Setting detail: THERAPIES SERIES
Discharge: HOME | End: 2020-10-15
Payer: COMMERCIAL

## 2020-10-26 ENCOUNTER — VIRTUAL VISIT (OUTPATIENT)
Dept: NEUROLOGY | Age: 45
End: 2020-10-26
Payer: COMMERCIAL

## 2020-10-26 PROCEDURE — G8428 CUR MEDS NOT DOCUMENT: HCPCS | Performed by: NURSE PRACTITIONER

## 2020-10-26 PROCEDURE — 99213 OFFICE O/P EST LOW 20 MIN: CPT | Performed by: NURSE PRACTITIONER

## 2020-10-26 ASSESSMENT — ENCOUNTER SYMPTOMS
GASTROINTESTINAL NEGATIVE: 1
RESPIRATORY NEGATIVE: 1
EYES NEGATIVE: 1

## 2020-10-26 NOTE — PATIENT INSTRUCTIONS
1. Continue with physical therapy recommendations  2. Continue with current medications  3. Follow up as needed. 4. Call if any questions or concerns.

## 2020-10-26 NOTE — PROGRESS NOTES
10/26/2020    TELEHEALTH EVALUATION -- Audio/Visual (During UIMJN-43 public health emergency)    HPI:    Babita Woodward (:  1975) has requested an audio/video evaluation for the following concern(s): dizziness. He is doing significantly better. He felt working with vestibular therpay really helped with his symptoms. He can still have occasional dizziness that is positional. His insurance company denied his MRI brain. He is being evaluated via video link to discuss the plan of care going forward. Review of Systems   Eyes: Negative. Respiratory: Negative. Cardiovascular: Negative. Gastrointestinal: Negative. Prior to Visit Medications    Medication Sig Taking?  Authorizing Provider   buPROPion (WELLBUTRIN XL) 150 MG extended release tablet Take 1 tablet by mouth every morning  Jamaica Mckeon DO   FLUoxetine (PROZAC) 10 MG capsule Use as previously directed to taper dose  Sarahdie SWAPNIL Elliott CNP   FLUoxetine (PROZAC) 20 MG capsule Use as directed to taper dose  Tiffaniee SWAPNIL Elliott CNP   aspirin EC 81 MG EC tablet Take 1 tablet by mouth daily  Med [facility],    FLUoxetine (PROZAC) 40 MG capsule Take 2 capsules by mouth daily  Jamaica Mckeon, DO       Social History     Tobacco Use    Smoking status: Former Smoker     Last attempt to quit:      Years since quittin.8    Smokeless tobacco: Former User     Types: Chew   Substance Use Topics    Alcohol use: No    Drug use: No        Past Medical History:   Diagnosis Date    Chronic back pain    ,   Past Surgical History:   Procedure Laterality Date    ENDOSCOPY, COLON, DIAGNOSTIC      LYMPHADENECTOMY          SLEEVE GASTRECTOMY N/A 2017    ROBOTIC GASTRECTOMY SLEEVE performed by Geraldo Patel MD at [facility]     ,   Social History     Tobacco Use    Smoking status: Former Smoker     Last attempt to quit: 2011     Years since quittin.8    Smokeless tobacco: Former User Types: Chew   Substance Use Topics    Alcohol use: No    Drug use: No   ,   Family History   Problem Relation Age of Onset    Diabetes Mother     Thyroid Disease Mother     Obesity Mother     Obesity Father     Diabetes Sister     Obesity Sister     Diabetes Maternal Grandmother     Obesity Maternal Grandmother     Cancer Paternal Grandmother     Heart Disease Paternal Grandfather        PHYSICAL EXAMINATION:  [ INSTRUCTIONS:  \"[x]\" Indicates a positive item  \"[]\" Indicates a negative item  -- DELETE ALL ITEMS NOT EXAMINED]  Vital Signs: (As obtained by patient/caregiver or practitioner observation)    Blood pressure-  Heart rate-    Respiratory rate-    Temperature-  Pulse oximetry-     Constitutional: [x] Appears well-developed and well-nourished [x] No apparent distress      [] Abnormal-   Mental status  [x] Alert and awake  [x] Oriented to person/place/time [x]Able to follow commands      Eyes:  EOM    [x]  Normal  [] Abnormal-  Sclera  [x]  Normal  [] Abnormal -         Discharge [x]  None visible  [] Abnormal -    HENT:   [x] Normocephalic, atraumatic. [] Abnormal   [x] Mouth/Throat: Mucous membranes are moist.     External Ears [x] Normal  [] Abnormal-     Neck: [x] No visualized mass     Pulmonary/Chest: [x] Respiratory effort normal.  [x] No visualized signs of difficulty breathing or respiratory distress        [] Abnormal-      Musculoskeletal:   [] Normal gait with no signs of ataxia         [x] Normal range of motion of neck        [] Abnormal-       Neurological:        [x] No Facial Asymmetry (Cranial nerve 7 motor function) (limited exam to video visit)          [x] No gaze palsy        [] Abnormal-         Skin:        [x] No significant exanthematous lesions or discoloration noted on facial skin         [] Abnormal-            Psychiatric:       [x] Normal Affect [x] No Hallucinations        [] Abnormal-     Other pertinent observable physical exam findings-     ASSESSMENT/PLAN:  1. Dizziness    He is doing significantly better. He felt working with vestibular therpay really helped with his symptoms. He can still have occasional dizziness that is positional. His insurance company denied his MRI brain. Overall, he is pleased with how he is doing. After detailed discussion with patient we agreed on the following plan. Plan:  1. Continue with physical therapy recommendations  2. Continue with current medications  3. Follow up as needed. 4. Call if any questions or concerns. Marcial Woodward is a 39 y.o. male being evaluated by a Virtual Visit (video visit) encounter to address concerns as mentioned above. A caregiver was present when appropriate. Due to this being a TeleHealth encounter (During TAQSX-02 public health emergency), evaluation of the following organ systems was limited: Vitals/Constitutional/EENT/Resp/CV/GI//MS/Neuro/Skin/Heme-Lymph-Imm. Pursuant to the emergency declaration under the [address] [place] [address] authority and the [employer] Act, this Virtual Visit was conducted with patient's (and/or legal guardian's) consent, to reduce the patient's risk of exposure to COVID-19 and provide necessary medical care. The patient (and/or legal guardian) has also been advised to contact this office for worsening conditions or problems, and seek emergency medical treatment and/or call 911 if deemed necessary. Patient identification was verified at the start of the visit: Yes    Total time spent on this encounter:  15 minutes    Services were provided through a video synchronous discussion virtually to substitute for in-person clinic visit. Patient and provider were located at their individual homes. --SWAPNIL Peres CNP on 10/26/2020 at 2:20 PM    An electronic signature was used to authenticate this note.

## 2020-11-05 NOTE — DISCHARGE SUMMARY
[facility]  [address] [place] [address].    Patient Name: Kam Woodward        CSN: [id number]   YOB: 1975  Gender: male  Savana Ames, SWAPNIL *,    Dizziness and giddiness [R42] ,      Patient is discharged from Physical Therapy services at this time. See last note for details related to results of therapy and goal achievement. Reason for discharge: patient n/s final visit. PT called and left VM to call regarding POC. will discharge as patient has not contacted therapy department and not seen in > 3 weeks. Maynor Martinez.  Dylan Robles # [id number]

## 2020-12-03 NOTE — TELEPHONE ENCOUNTER
Recent Visits  Date Type Provider Dept   09/18/20 Office Visit Emerson NathanSWAPNIL monroe CNP [facility]  [address]   07/23/20 Office Visit Tessa Elkins DO [facility]   Showing recent visits within past 540 days with a meds authorizing provider and meeting all other requirements     Future Appointments  No visits were found meeting these conditions.    Showing future appointments within next 150 days with a meds authorizing provider and meeting all other requirements         Future Appointments   Date Time Provider January Hough   12/8/2020  3:00 PM SWAPNIL Kumar CNP [facility] [address] [facility] [place] [address]   12/9/2020  3:30 PM JAIMIE Michael [facility] [place]

## 2020-12-04 RX ORDER — TIZANIDINE 4 MG/1
TABLET ORAL
Qty: 30 TABLET | Refills: 3 | Status: SHIPPED | OUTPATIENT
Start: 2020-12-04 | End: 2022-09-04 | Stop reason: SDUPTHER

## 2020-12-07 ENCOUNTER — HOSPITAL ENCOUNTER (OUTPATIENT)
Age: 45
Discharge: HOME | End: 2020-12-07
Payer: COMMERCIAL

## 2020-12-07 DIAGNOSIS — Z13.21 SCREENING FOR MALNUTRITION: ICD-10-CM

## 2020-12-07 DIAGNOSIS — Z98.84 S/P LAPAROSCOPIC SLEEVE GASTRECTOMY: ICD-10-CM

## 2020-12-07 DIAGNOSIS — E66.3 OVERWEIGHT (BMI 25.0-29.9): ICD-10-CM

## 2020-12-07 DIAGNOSIS — E55.9 VITAMIN D DEFICIENCY: ICD-10-CM

## 2020-12-07 DIAGNOSIS — K91.2 POSTSURGICAL MALABSORPTION: ICD-10-CM

## 2020-12-07 LAB
BASOPHILS # BLD: 0.5 %
BASOPHILS ABSOLUTE: 0 THOU/MM3 (ref 0–0.1)
EOSINOPHIL # BLD: 0.8 %
EOSINOPHILS ABSOLUTE: 0.1 THOU/MM3 (ref 0–0.4)
ERYTHROCYTE [DISTWIDTH] IN BLOOD BY AUTOMATED COUNT: 12.2 % (ref 11.5–14.5)
ERYTHROCYTE [DISTWIDTH] IN BLOOD BY AUTOMATED COUNT: 42.1 FL (ref 35–45)
HCT VFR BLD CALC: 44.5 % (ref 42–52)
HEMOGLOBIN: 14.9 GM/DL (ref 14–18)
IMMATURE GRANS (ABS): 0.01 THOU/MM3 (ref 0–0.07)
IMMATURE GRANULOCYTES: 0.2 %
LYMPHOCYTES # BLD: 31.4 %
LYMPHOCYTES ABSOLUTE: 2.1 THOU/MM3 (ref 1–4.8)
MCH RBC QN AUTO: 31.8 PG (ref 26–33)
MCHC RBC AUTO-ENTMCNC: 33.5 GM/DL (ref 32.2–35.5)
MCV RBC AUTO: 94.9 FL (ref 80–94)
MONOCYTES # BLD: 9.3 %
MONOCYTES ABSOLUTE: 0.6 THOU/MM3 (ref 0.4–1.3)
NUCLEATED RED BLOOD CELLS: 0 /100 WBC
PLATELET # BLD: 105 THOU/MM3 (ref 130–400)
PMV BLD AUTO: 12.1 FL (ref 9.4–12.4)
RBC # BLD: 4.69 MILL/MM3 (ref 4.7–6.1)
REASON FOR REJECTION: NORMAL
REJECTED TEST: NORMAL
SEG NEUTROPHILS: 57.8 %
SEGMENTED NEUTROPHILS ABSOLUTE COUNT: 3.8 THOU/MM3 (ref 1.8–7.7)
WBC # BLD: 6.6 THOU/MM3 (ref 4.8–10.8)

## 2020-12-07 PROCEDURE — 36415 COLL VENOUS BLD VENIPUNCTURE: CPT

## 2020-12-07 PROCEDURE — 84134 ASSAY OF PREALBUMIN: CPT

## 2020-12-07 PROCEDURE — 83970 ASSAY OF PARATHORMONE: CPT

## 2020-12-07 PROCEDURE — 80053 COMPREHEN METABOLIC PANEL: CPT

## 2020-12-07 PROCEDURE — 84443 ASSAY THYROID STIM HORMONE: CPT

## 2020-12-07 PROCEDURE — 83540 ASSAY OF IRON: CPT

## 2020-12-07 PROCEDURE — 82746 ASSAY OF FOLIC ACID SERUM: CPT

## 2020-12-07 PROCEDURE — 82728 ASSAY OF FERRITIN: CPT

## 2020-12-07 PROCEDURE — 82607 VITAMIN B-12: CPT

## 2020-12-07 PROCEDURE — 80061 LIPID PANEL: CPT

## 2020-12-07 PROCEDURE — 83036 HEMOGLOBIN GLYCOSYLATED A1C: CPT

## 2020-12-07 PROCEDURE — 82306 VITAMIN D 25 HYDROXY: CPT

## 2020-12-07 PROCEDURE — 83550 IRON BINDING TEST: CPT

## 2020-12-07 PROCEDURE — 85025 COMPLETE CBC W/AUTO DIFF WBC: CPT

## 2020-12-08 ENCOUNTER — VIRTUAL VISIT (OUTPATIENT)
Dept: PSYCHIATRY | Age: 45
End: 2020-12-08
Payer: COMMERCIAL

## 2020-12-08 ENCOUNTER — HOSPITAL ENCOUNTER (OUTPATIENT)
Age: 45
Discharge: HOME | End: 2020-12-08
Payer: COMMERCIAL

## 2020-12-08 LAB
ALBUMIN SERPL-MCNC: 4.7 G/DL (ref 3.5–5.1)
ALP BLD-CCNC: 56 U/L (ref 38–126)
ALT SERPL-CCNC: 14 U/L (ref 11–66)
ANION GAP SERPL CALCULATED.3IONS-SCNC: 10 MEQ/L (ref 8–16)
AST SERPL-CCNC: 19 U/L (ref 5–40)
AVERAGE GLUCOSE: 102 MG/DL (ref 70–126)
BILIRUB SERPL-MCNC: 0.4 MG/DL (ref 0.3–1.2)
BUN BLDV-MCNC: 14 MG/DL (ref 7–22)
CALCIUM SERPL-MCNC: 10.1 MG/DL (ref 8.5–10.5)
CHLORIDE BLD-SCNC: 103 MEQ/L (ref 98–111)
CHOLESTEROL, TOTAL: 220 MG/DL (ref 100–199)
CO2: 29 MEQ/L (ref 23–33)
CREAT SERPL-MCNC: 0.8 MG/DL (ref 0.4–1.2)
FERRITIN: 153 NG/ML (ref 22–322)
FOLATE: 10.9 NG/ML (ref 4.8–24.2)
GFR SERPL CREATININE-BSD FRML MDRD: > 90 ML/MIN/1.73M2
GLUCOSE BLD-MCNC: 78 MG/DL (ref 70–108)
HBA1C MFR BLD: 5.4 % (ref 4.4–6.4)
HDLC SERPL-MCNC: 48 MG/DL
IRON: 88 UG/DL (ref 65–195)
LDL CHOLESTEROL CALCULATED: 148 MG/DL
POTASSIUM SERPL-SCNC: 4 MEQ/L (ref 3.5–5.2)
PREALBUMIN: 26.2 MG/DL (ref 20–40)
PTH INTACT: 26.1 PG/ML (ref 15–65)
SODIUM BLD-SCNC: 142 MEQ/L (ref 135–145)
TOTAL IRON BINDING CAPACITY: 296 UG/DL (ref 171–450)
TOTAL PROTEIN: 6.9 G/DL (ref 6.1–8)
TRIGL SERPL-MCNC: 119 MG/DL (ref 0–199)
TSH SERPL DL<=0.05 MIU/L-ACNC: 2.39 UIU/ML (ref 0.4–4.2)
VITAMIN B-12: 982 PG/ML (ref 211–911)
VITAMIN D 25-HYDROXY: 27 NG/ML (ref 30–100)

## 2020-12-08 PROCEDURE — G8427 DOCREV CUR MEDS BY ELIG CLIN: HCPCS | Performed by: PSYCHIATRY & NEUROLOGY

## 2020-12-08 PROCEDURE — G8417 CALC BMI ABV UP PARAM F/U: HCPCS | Performed by: PSYCHIATRY & NEUROLOGY

## 2020-12-08 PROCEDURE — 36415 COLL VENOUS BLD VENIPUNCTURE: CPT

## 2020-12-08 PROCEDURE — G8484 FLU IMMUNIZE NO ADMIN: HCPCS | Performed by: PSYCHIATRY & NEUROLOGY

## 2020-12-08 PROCEDURE — 90792 PSYCH DIAG EVAL W/MED SRVCS: CPT | Performed by: PSYCHIATRY & NEUROLOGY

## 2020-12-08 PROCEDURE — 84425 ASSAY OF VITAMIN B-1: CPT

## 2020-12-08 PROCEDURE — 1036F TOBACCO NON-USER: CPT | Performed by: PSYCHIATRY & NEUROLOGY

## 2020-12-08 RX ORDER — BUPROPION HYDROCHLORIDE 300 MG/1
300 TABLET ORAL EVERY MORNING
Qty: 30 TABLET | Refills: 0 | Status: SHIPPED | OUTPATIENT
Start: 2020-12-08 | End: 2021-01-09 | Stop reason: SDUPTHER

## 2020-12-08 NOTE — PROGRESS NOTES
[facility]  Psychiatric Assessment       CHIEF COMPLAINT:  Anxiety    History obtained from:  patient, electronic medical record    HISTORY OF PRESENT ILLNESS:    Kaylah Tomlinson Parent is a 39 y.o. male with significant history of depression who presents to the office today as a new patient to establish care. Patient referred here because he had episode of suicidal ideation in  of this year and wanted PCP to find him more help. He has had increasing anxiety since about . At that time, he had just lost a job and did have feelings of sadness but says his most prominent symptom has always been irritability and anxiety. He was started on low dose paxil and has been gradually increasing the dose over the years. Has had periods where it was under control but eventually had sexual dysfunction from an 80mg dose. Was switched to wellbutrin with resolution of sexual dysfunction but says irritability and anxiety have been slightly less well controlled. Recently, has felt more anxious over past year with more feelings of chest tightness, thoughts racing, difficulty sleeping, and irratability. Does have difficulty falling asleep or staying asleep because he says he is always worried about future. Denied any stressors outside of normal covid problems. 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 Usually manifests as irritable outbursts at family for simple questions, but can be at work as well. Did have single thought while driving about what would happen if he . This alarmed him and he has not had any ideations about death since then. Does love life and has few feelings of sadness. Never had any SI beforehand and thinks that episode may have been from stress as well. Never had periods of anhedonia, says family is only hobby.       Has never seen therapist.      Vasile Palafox HISTORY:      Outpatient treatment: Through PCP  Suicide Attempts: no  Inpatient Psychiatric Admission: no    Past Psychiatric Meds:     Paxil 5mg-80mg  Wellbutrin 150mg    Adverse reactions from psychotropic medications:      Sexual dysfunction from paxil 80mg      Lifetime Psychiatric Review of Systems     ·    Obsessions and Compulsions: no    ·    Susie or Hypomania: no  ·    Hallucinations: no  ·    Panic Attacks:  no   ·    Delusions:  no  ·    Phobias: no        Past Medical History:        Diagnosis Date    Chronic back pain        Past Surgical History:        Procedure Laterality Date    ENDOSCOPY, COLON, DIAGNOSTIC      LYMPHADENECTOMY      1978    SLEEVE GASTRECTOMY N/A 12/11/2017    ROBOTIC GASTRECTOMY SLEEVE performed by Cassy Weller MD at [address] [place] [address]         Current Meds    Current Outpatient Medications:     tiZANidine (ZANAFLEX) 4 MG tablet, take 1 tablet by mouth three times a day if needed for BACK PAIN, Disp: 30 tablet, Rfl: 3    buPROPion (WELLBUTRIN XL) 150 MG extended release tablet, Take 1 tablet by mouth every morning, Disp: 30 tablet, Rfl: 3    FLUoxetine (PROZAC) 10 MG capsule, Use as previously directed to taper dose, Disp: 10 capsule, Rfl: 0    FLUoxetine (PROZAC) 20 MG capsule, Use as directed to taper dose, Disp: 11 capsule, Rfl: 0    aspirin EC 81 MG EC tablet, Take 1 tablet by mouth daily, Disp: 30 tablet, Rfl: 1    FLUoxetine (PROZAC) 40 MG capsule, Take 2 capsules by mouth daily, Disp: 60 capsule, Rfl: 5     Allergies:  Patient has no known allergies.     Social History:     TOBACCO: Denies  ETOH:  Denies  DRUGS: Denies  MARITAL STATUS:   OCCUPATION: Teacher  LEVEL OF EDUCATION: College  RESIDENCE: Currently lives with family      Family Medical and Psychiatric History:     Reviewed and noncontributory      Problem Relation Age of Onset    Diabetes Mother     Thyroid Disease Mother     Obesity Mother     Obesity Father     Diabetes Sister     Obesity Sister     Diabetes Maternal Grandmother     Obesity Maternal Grandmother     Cancer Paternal Grandmother     Heart Disease Paternal Grandfather        Mental Status Exam  Level of consciousness:  Within normal limits  Appearance: wearing street clothes, seated on couch  Behavior/Motor: no abnormalities noted  Attitude toward examiner:  Cooperative, attentive, good eye contact  Speech:  Spontaneous, normal rate and volume  Mood:  Pleasant  Affect:  mood congruent  Thought processes:  linear, goal directed and coherent  Thought content:   Denies suicidal ideations   Denies  homicidal ideations               Denies  hallucinations   Denies delusions  Cognition:  In tact  Memory: age appropriate  Insight and judgement: fair  Medication side effects:  denies      DSM-5 DIAGNOSIS:    Generalized Anxiety Disorder      PLAN    1. Will increase wellbutrin from 150mg to 300mg to optimize regimen as he was on large Paxil dose previously. While paxil does have better efficacy for anxiety disorders had to discontinue due to side effects. If medication dosage optimization is ineffective will add buspar at next visit. Will refer for therapy. Discussed home exercises at length and patient agreeable. Electronically signed by Paula Paul MD on 12/8/20 at 3:33 PM EST Time Spent 45 minute   I have discussed with the patient risks, benefits, side effects, and alternatives (and relevant risks, benefits, and side effects related to alternatives or not receiving care), and likelihood of the success. Patient instructed to seek emergency help via ED or calling 911 should symptoms become severe, or if thoughts to harm self or others develop. Patient stated clear understanding and is agreeable to treatment plan.

## 2020-12-13 LAB — VITAMIN B1 WHOLE BLOOD: 154 NMOL/L (ref 70–180)

## 2020-12-17 ENCOUNTER — OFFICE VISIT (OUTPATIENT)
Dept: BARIATRICS/WEIGHT MGMT | Age: 45
End: 2020-12-17
Payer: COMMERCIAL

## 2020-12-17 VITALS
RESPIRATION RATE: 18 BRPM | BODY MASS INDEX: 28.23 KG/M2 | HEIGHT: 73 IN | TEMPERATURE: 97.9 F | HEART RATE: 56 BPM | WEIGHT: 213 LBS | DIASTOLIC BLOOD PRESSURE: 70 MMHG | SYSTOLIC BLOOD PRESSURE: 110 MMHG

## 2020-12-17 PROCEDURE — 99213 OFFICE O/P EST LOW 20 MIN: CPT | Performed by: PHYSICIAN ASSISTANT

## 2020-12-17 PROCEDURE — G8484 FLU IMMUNIZE NO ADMIN: HCPCS | Performed by: PHYSICIAN ASSISTANT

## 2020-12-17 PROCEDURE — G8427 DOCREV CUR MEDS BY ELIG CLIN: HCPCS | Performed by: PHYSICIAN ASSISTANT

## 2020-12-17 PROCEDURE — 1036F TOBACCO NON-USER: CPT | Performed by: PHYSICIAN ASSISTANT

## 2020-12-17 PROCEDURE — G8417 CALC BMI ABV UP PARAM F/U: HCPCS | Performed by: PHYSICIAN ASSISTANT

## 2020-12-17 RX ORDER — M-VIT,TX,IRON,MINS/CALC/FOLIC 27MG-0.4MG
1 TABLET ORAL 2 TIMES DAILY
COMMUNITY

## 2020-12-17 RX ORDER — OMEPRAZOLE 20 MG/1
20 CAPSULE, DELAYED RELEASE ORAL
Qty: 30 CAPSULE | Refills: 5 | Status: SHIPPED | OUTPATIENT
Start: 2020-12-17 | End: 2021-10-30 | Stop reason: SDUPTHER

## 2020-12-17 NOTE — PATIENT INSTRUCTIONS
Stay well hydrated. Drink a minimum of 64 oz of non-carbonated, non-caffeinated fluids daily. Nutritional education occurred during visit. Tolerating diet. Continue  60-80 grams of protein each day. Signs and symptoms reviewed with patient that would be concerning and need her to return to office for re-evaluation. Patient will call if any questions or concerns arrise. Importance of physical activity discussed with patient. Increase physical activity as tolerated  Add strength training  Continue taking Multivitamin, Calcium and add Vit D3 5,000IU daily. Order given to repeat Vit D level in 3 months. Start Omeprazole 20mg daily before breakfast- call if sx not resolved.   Encouraged to attend support groups  3 year labs reviewed with patient today  SECA scale next visit as not working today  Follow up in 6 month

## 2020-12-17 NOTE — PROGRESS NOTES
 WISDOM TOOTH EXTRACTION         Past Social History:  Social History     Socioeconomic History    Marital status:      Spouse name: Not on file    Number of children: Not on file    Years of education: Not on file    Highest education level: Not on file   Occupational History    Not on file   Social Needs    Financial resource strain: Not on file    Food insecurity     Worry: Not on file     Inability: Not on file    Transportation needs     Medical: Not on file     Non-medical: Not on file   Tobacco Use    Smoking status: Former Smoker     Quit date:      Years since quittin.9    Smokeless tobacco: Former User     Types: Chew   Substance and Sexual Activity    Alcohol use: No    Drug use: No    Sexual activity: Not on file   Lifestyle    Physical activity     Days per week: Not on file     Minutes per session: Not on file    Stress: Not on file   Relationships    Social connections     Talks on phone: Not on file     Gets together: Not on file     Attends Judaism service: Not on file     Active member of club or organization: Not on file     Attends meetings of clubs or organizations: Not on file     Relationship status: Not on file    Intimate partner violence     Fear of current or ex partner: Not on file     Emotionally abused: Not on file     Physically abused: Not on file     Forced sexual activity: Not on file   Other Topics Concern    Not on file   Social History Narrative    Not on file        Medications:   Current Outpatient Medications   Medication Sig Dispense Refill    Multiple Vitamins-Minerals (THERAPEUTIC MULTIVITAMIN-MINERALS) tablet Take 1 tablet by mouth 2 times daily      omeprazole (PRILOSEC) 20 MG delayed release capsule Take 1 capsule by mouth every morning (before breakfast) 30 capsule 5    buPROPion (WELLBUTRIN XL) 300 MG extended release tablet Take 1 tablet by mouth every morning 30 tablet 0  tiZANidine (ZANAFLEX) 4 MG tablet take 1 tablet by mouth three times a day if needed for BACK PAIN 30 tablet 3    aspirin EC 81 MG EC tablet Take 1 tablet by mouth daily 30 tablet 1     No current facility-administered medications for this visit. Allergies:   No Known Allergies    Subjective:    Constitutional: Denies any fever, chills, fatigue. Wound: Denies any rash, skin color changes or wound problems. Resp: Denies any cough, shortness of breath. CV: Denies any chest pain, orthopnea or syncope. MS: Denies myalgias, arthralgias. GI: Denies any nausea, vomiting, diarrhea, constipation, melena, hematochezia. No incisional discomfort.(+)reflux  : Denies any hematuria, hesitancy or dysuria. NEURO: Denies seizures, headache. Objective:    /70 (Site: Right Upper Arm, Position: Sitting, Cuff Size: Large Adult)   Pulse 56   Temp 97.9 °F (36.6 °C) (Infrared)   Resp 18   Ht 6' 1\" (1.854 m)   Wt 213 lb (96.6 kg)   BMI 28.10 kg/m²   Physical Examination:   Constitutional: Alert and oriented to person, place and time. Well-developed, well- nourished. Head: Normocephalic and atraumatic  Neck: Supple. Eyes: EOMI b/l. Conjunctivae normal.  No scleral icterus. Respiratory: Effort normal. No respiratory distress. Abd: Benign  Ext:  Movement x 4. No edema  Skin; Warm and dry, no visible rashes, lesions or ulcers.    Neuro: Cranial Nerves Grossly Intact; nml coordination          Labs:  CBC   Lab Results   Component Value Date    WBC 6.6 12/07/2020    RBC 4.69 12/07/2020    HGB 14.9 12/07/2020    HCT 44.5 12/07/2020    MCV 94.9 12/07/2020    MCH 31.8 12/07/2020    MCHC 33.5 12/07/2020    RDW 12.8 07/12/2018     12/07/2020    MPV 12.1 12/07/2020    RBCMORP NORMAL 05/01/2017    SEGSPCT 57.8 12/07/2020    LABLYMP 31.4 12/07/2020    MONOPCT 9.3 12/07/2020    MONOPCT 8.4 07/12/2018    LABEOS 0.8 12/07/2020    BASO 0.5 12/07/2020    NRBC 0 12/07/2020    SEGSABS 3.8 12/07/2020 LYMPHSABS 2.1 12/07/2020    MONOSABS 0.6 12/07/2020    EOSABS 0.1 12/07/2020    BASOSABS 0.0 12/07/2020        BMP/CMP   Lab Results   Component Value Date    GLUCOSE 78 12/07/2020    GLUCOSE 90 07/12/2018    CREATININE 0.8 12/07/2020    BUN 14 12/07/2020     12/07/2020    K 4.0 12/07/2020     12/07/2020    CO2 29 12/07/2020    CALCIUM 10.1 12/07/2020    AST 19 12/07/2020    ALKPHOS 56 12/07/2020    PROT 6.9 12/07/2020    LABALBU 4.7 12/07/2020    LABALBU 4.6 07/15/2011    BILITOT 0.4 12/07/2020    ALT 14 12/07/2020        PREALBUMIN   Lab Results   Component Value Date    PREALBUMIN 26.2 12/07/2020        VITAMIN B12   Lab Results   Component Value Date    DISETCIG37 982 12/07/2020        24 HOUR URINE CALCIUM   No results found for: JASMIN VerasUR     VITAMIN D   Lab Results   Component Value Date    VITD25 27 12/07/2020        VITAMIN B1/ THIAMINE   Lab Results   Component Value Date    JYUE0JINIDE 154 12/08/2020        RBC FOLATE   Lab Results   Component Value Date    FOLATE 10.9 12/07/2020        LIPID SCREEN (FASTING)   Lab Results   Component Value Date    CHOL 220 12/07/2020    TRIG 119 12/07/2020    HDL 48 12/07/2020    LDLCALC 148 12/07/2020   ,     HGA1C (T2DM ONLY)   Lab Results   Component Value Date    LABA1C 5.4 12/07/2020    AVGG 102 12/07/2020        TSH   Lab Results   Component Value Date    TSH 2.390 12/07/2020        IRON   Lab Results   Component Value Date    IRON 88 12/07/2020        IONIZED CALCIUM   No results found for: IONCA, CAION      Assessment:       Diagnosis Orders   1. S/P laparoscopic sleeve gastrectomy     2. Overweight (BMI 25.0-29.9)     3. Postsurgical malabsorption     4. Screening for malnutrition     5. Vitamin D deficiency  Vitamin D 25 Hydroxy   6. Depression, unspecified depression type     7.  Gastroesophageal reflux disease, unspecified whether esophagitis present  omeprazole (PRILOSEC) 20 MG delayed release capsule     Plan: Stay well hydrated. Drink a minimum of 64 oz of non-carbonated, non-caffeinated fluids daily. Nutritional education occurred during visit. Tolerating diet. Continue  60-80 grams of protein each day. Signs and symptoms reviewed with patient that would be concerning and need her to return to office for re-evaluation. Patient will call if any questions or concerns arrise. Importance of physical activity discussed with patient. Increase physical activity as tolerated  Add strength training  Continue taking Multivitamin, Calcium and add Vit D3 5,000IU daily. Order given to repeat Vit D level in 3 months. Start Omeprazole 20mg daily before breakfast- call if sx not resolved. Encouraged to attend support groups  3 year labs reviewed with patient today  SECA scale next visit as not working today  Follow up in 6 months. I spent 15 minutes in direct patient care with greater than 50% spent in counseling and coordination of care.      Electronically signed by JAIMIE Michael on 12/17/2020 at 9:54 AM

## 2020-12-30 ENCOUNTER — VIRTUAL VISIT (OUTPATIENT)
Dept: PSYCHIATRY | Age: 45
End: 2020-12-30
Payer: COMMERCIAL

## 2020-12-30 DIAGNOSIS — F43.21 ADJUSTMENT DISORDER WITH DEPRESSED MOOD: Primary | ICD-10-CM

## 2020-12-30 DIAGNOSIS — F41.1 GENERALIZED ANXIETY DISORDER: ICD-10-CM

## 2020-12-30 PROCEDURE — G8428 CUR MEDS NOT DOCUMENT: HCPCS | Performed by: REGISTERED NURSE

## 2020-12-30 PROCEDURE — G8482 FLU IMMUNIZE ORDER/ADMIN: HCPCS | Performed by: REGISTERED NURSE

## 2020-12-30 PROCEDURE — G8417 CALC BMI ABV UP PARAM F/U: HCPCS | Performed by: REGISTERED NURSE

## 2020-12-30 PROCEDURE — 1036F TOBACCO NON-USER: CPT | Performed by: REGISTERED NURSE

## 2020-12-30 PROCEDURE — 99213 OFFICE O/P EST LOW 20 MIN: CPT | Performed by: REGISTERED NURSE

## 2020-12-30 NOTE — PROGRESS NOTES
Thought processes:  linear, goal directed and coherent  Thought content:  denies homicidal ideation  Suicidal Ideation:  denies suicidal ideation  Delusions:  no evidence of delusions  Perceptual Disturbance:  denies any perceptual disturbance  Cognition:  Intact  Memory: age appropriate  Insight & Judgement: fair  Medication side effects:  denies       Medications     1. Wellbutrin/bupropion 300mg daily--am--continue medications as prescribed (bupropion/Wellbutrin XL 300mg daily--am)      ASSESSMENT     Marilou Sherman appears to be doing fairly well in spite of the news he received last evening. He reports feeling as if \"I am in shock\". He denies feelings of wanting to harm himself, which we discussed at length. Denies homicidal ideations, as well. PLAN     1. Continue Wellbutrin as prescribed (300mg daily in am)    2. Recommend individual and marriage counselling     3. As he has a history of suicidal ideation additional concern about safety. Marilou Sherman will reach out if he is having difficulties between now and next appointment    Follow up in four weeks, sooner PRN, Marilou Sherman will monitor for thoughts of wanting to harm self or others    Physicians Signature:  Electronically signed by Edie Denver, APRN - CNP on 20 at 1:57 PM RAFAEL Woodward (:  1975) is a 39 y.o. male,Established patient, here for evaluation of the following chief complaint(s): Anxiety      ASSESSMENT/PLAN:  1. Adjustment disorder with depressed mood  2. Generalized anxiety disorder      Return in about 4 weeks (around 2021), or if symptoms worsen or fail to improve, for medication management.     SUBJECTIVE/OBJECTIVE:  [facility]    Review of Systems    Patient-Reported Vitals 9/15/2020   Patient-Reported Weight 205   Patient-Reported Height 62   Patient-Reported Systolic 010   Patient-Reported Diastolic 86   Patient-Reported Pulse 48   Patient-Reported Temperature 97.9   Patient-Reported SpO2 98        Physical Exam--none for this visit 30 minutes      Kisha Gomez Parent is a 39 y.o. male being evaluated by a Virtual Visit (video visit) encounter to address concerns as mentioned above. A caregiver was present when appropriate. Due to this being a TeleHealth encounter (During NOORD-81 public health emergency), evaluation of the following organ systems was limited: Vitals/Constitutional/EENT/Resp/CV/GI//MS/Neuro/Skin/Heme-Lymph-Imm. Pursuant to the emergency declaration under the [address] [place] [address] and the [employer] Act, this Virtual Visit was conducted with patient's (and/or legal guardian's) consent, to reduce the patient's risk of exposure to COVID-19 and provide necessary medical care. The patient (and/or legal guardian) has also been advised to contact this office for worsening conditions or problems, and seek emergency medical treatment and/or call 911 if deemed necessary. Patient identification was verified at the start of the visit: Yes       Services were provided through a video synchronous discussion virtually to substitute for in-person clinic visit. Patient and provider were located at their individual homes. An electronic signature was used to authenticate this note.     --Luis Fall, APRN - CNP

## 2021-01-11 RX ORDER — BUPROPION HYDROCHLORIDE 300 MG/1
300 TABLET ORAL EVERY MORNING
Qty: 30 TABLET | Refills: 0 | Status: SHIPPED | OUTPATIENT
Start: 2021-01-11 | End: 2021-01-27 | Stop reason: SDUPTHER

## 2021-01-27 ENCOUNTER — VIRTUAL VISIT (OUTPATIENT)
Dept: PSYCHIATRY | Age: 46
End: 2021-01-27
Payer: COMMERCIAL

## 2021-01-27 DIAGNOSIS — F43.23 ADJUSTMENT DISORDER WITH MIXED ANXIETY AND DEPRESSED MOOD: Primary | ICD-10-CM

## 2021-01-27 DIAGNOSIS — F41.1 GENERALIZED ANXIETY DISORDER: ICD-10-CM

## 2021-01-27 DIAGNOSIS — F17.210 TOBACCO DEPENDENCE DUE TO CIGARETTES: ICD-10-CM

## 2021-01-27 PROCEDURE — 99214 OFFICE O/P EST MOD 30 MIN: CPT | Performed by: REGISTERED NURSE

## 2021-01-27 PROCEDURE — G8428 CUR MEDS NOT DOCUMENT: HCPCS | Performed by: REGISTERED NURSE

## 2021-01-27 PROCEDURE — G8482 FLU IMMUNIZE ORDER/ADMIN: HCPCS | Performed by: REGISTERED NURSE

## 2021-01-27 PROCEDURE — G8417 CALC BMI ABV UP PARAM F/U: HCPCS | Performed by: REGISTERED NURSE

## 2021-01-27 PROCEDURE — 1036F TOBACCO NON-USER: CPT | Performed by: REGISTERED NURSE

## 2021-01-27 RX ORDER — BUPROPION HYDROCHLORIDE 300 MG/1
300 TABLET ORAL EVERY MORNING
Qty: 30 TABLET | Refills: 0 | Status: SHIPPED | OUTPATIENT
Start: 2021-01-27 | End: 2021-02-23 | Stop reason: SDUPTHER

## 2021-01-27 NOTE — PROGRESS NOTES
This note will not be viewable in [facility] for the following reason(s). This is a Psychotherapy Note. [facility]   Progress Note     Chief Complaint   Patient presents with    Depression    Follow-up    1 Month Follow-Up    Anxiety          SUBJECTIVE:    Nacho England reports he is \"getting  (from [place]). But I am pretty fine with it. \" He is trying to make this a smooth transition for the kids. He has started seeing another woman, Carolina Moore. Reports he is \"well aware of all the dangers of getting into a relationship right now\" and that he is \"being careful\". Depression  Continues with mild to moderate symptoms of depression associated with the events surrounding his divorce  Depressed moodsad, down  Feelings of helplessness  Fatigue or loss of energy  Feelings of worthlessness or guilt    Anxiety   Restlessness or feeling keyed-up or on edge    Irritability   Muscle tension    Nacho England believes \"I am handling everything okay and the 300mg of Wellbutrin is still helping\". OBJECTIVE  Level of consciousness:  Within normal limits  Appearance: Street clothes, seated on couch, with good grooming  Behavior/Motor: No abnormalities noted  Attitude toward examiner:  Cooperative, attentive, good eye contact  Speech:  Clear, normal rate and rhythm  Mood:  Euthymic, \"doing pretty well\"  Affect:  mood congruent  Thought processes:  linear, goal directed and coherent  Thought content:  denies homicidal ideation  Suicidal Ideation:  denies suicidal ideation  Delusions:  no evidence of delusions  Perceptual Disturbance:  denies any perceptual disturbance  Cognition:  In tact  Memory: age appropriate  Insight & Judgement: fair  Medication side effects:  denies       Medications   Wellbutrin 300mg      ASSESSMENT   Adjustment disorder with mixed anxiety and depressive symptoms  Generalized anxiety disorder  Tobacco dependence due to cigarettes    PLAN   1.  Continue Wellbutrin 300mg 2. Encourage Glory Jose Cruz to continue searching for a therapist (mailed counselling resources packet to home address)    Follow up in 4 weeks, sooner PRN    Physicians Signature:  Electronically signed by SWAPNIL Dodd CNP on 1/27/21 at 3:22 PM RAFAEL Roberts Parent is a 39 y.o. male being evaluated by a Virtual Visit (video visit) encounter to address concerns as mentioned above. A caregiver was present when appropriate. Due to this being a TeleHealth encounter (During SIMZE-17 public health emergency), evaluation of the following organ systems was limited: Vitals/Constitutional/EENT/Resp/CV/GI//MS/Neuro/Skin/Heme-Lymph-Imm. Pursuant to the emergency declaration under the [address] [place] [address], [address] [place] [address] authority and the [employer] Act, this Virtual Visit was conducted with patient's (and/or legal guardian's) consent, to reduce the patient's risk of exposure to COVID-19 and provide necessary medical care. The patient (and/or legal guardian) has also been advised to contact this office for worsening conditions or problems, and seek emergency medical treatment and/or call 911 if deemed necessary. Patient identification was verified at the start of the visit: Yes    Total time spent for this encounter: 35 minutes    Services were provided through a video synchronous discussion virtually to substitute for in-person clinic visit. Patient and provider were located at their individual homes. --SWAPNIL Dodd CNP on 1/27/2021 at 4:26 PM    An electronic signature was used to authenticate this note.

## 2021-02-23 ENCOUNTER — VIRTUAL VISIT (OUTPATIENT)
Dept: PSYCHIATRY | Age: 46
End: 2021-02-23
Payer: COMMERCIAL

## 2021-02-23 DIAGNOSIS — E66.3 OVERWEIGHT WITH BODY MASS INDEX (BMI) OF 27 TO 27.9 IN ADULT: ICD-10-CM

## 2021-02-23 DIAGNOSIS — F43.23 ADJUSTMENT DISORDER WITH MIXED ANXIETY AND DEPRESSED MOOD: ICD-10-CM

## 2021-02-23 DIAGNOSIS — F41.1 GAD (GENERALIZED ANXIETY DISORDER): Primary | ICD-10-CM

## 2021-02-23 DIAGNOSIS — F33.0 MILD EPISODE OF RECURRENT MAJOR DEPRESSIVE DISORDER (HCC): ICD-10-CM

## 2021-02-23 DIAGNOSIS — F17.210 TOBACCO DEPENDENCE DUE TO CIGARETTES: ICD-10-CM

## 2021-02-23 PROCEDURE — G8482 FLU IMMUNIZE ORDER/ADMIN: HCPCS | Performed by: REGISTERED NURSE

## 2021-02-23 PROCEDURE — G8428 CUR MEDS NOT DOCUMENT: HCPCS | Performed by: REGISTERED NURSE

## 2021-02-23 PROCEDURE — G8417 CALC BMI ABV UP PARAM F/U: HCPCS | Performed by: REGISTERED NURSE

## 2021-02-23 PROCEDURE — 1036F TOBACCO NON-USER: CPT | Performed by: REGISTERED NURSE

## 2021-02-23 PROCEDURE — 99214 OFFICE O/P EST MOD 30 MIN: CPT | Performed by: REGISTERED NURSE

## 2021-02-23 RX ORDER — BUPROPION HYDROCHLORIDE 300 MG/1
300 TABLET ORAL EVERY MORNING
Qty: 30 TABLET | Refills: 2 | Status: SHIPPED | OUTPATIENT
Start: 2021-02-23 | End: 2021-06-12 | Stop reason: SDUPTHER

## 2021-02-23 NOTE — PROGRESS NOTES
 omeprazole (PRILOSEC) 20 MG delayed release capsule Take 1 capsule by mouth every morning (before breakfast) 30 capsule 5    tiZANidine (ZANAFLEX) 4 MG tablet take 1 tablet by mouth three times a day if needed for BACK PAIN 30 tablet 3    aspirin EC 81 MG EC tablet Take 1 tablet by mouth daily 30 tablet 1     No current facility-administered medications for this visit. ASSESSMENT   Mild episode of recurrent major depressive disorder  Adjustment disorder with mixed anxiety and depression  Generalized anxiety disorder  Tobacco use disorder due to dependence  Overweight with body mass index of 27.0-27.9    PLAN   Continue medications as prescribed      Follow up No follow-ups on file.  , sooner PRN    Physicians Signature:  Electronically signed by SWAPNIL Fernandez Chi, CNP on 2/23/21 at 3:07 PM EST

## 2021-03-10 ENCOUNTER — OFFICE VISIT (OUTPATIENT)
Dept: FAMILY MEDICINE CLINIC | Age: 46
End: 2021-03-10
Payer: COMMERCIAL

## 2021-03-10 VITALS
HEIGHT: 73 IN | OXYGEN SATURATION: 98 % | SYSTOLIC BLOOD PRESSURE: 122 MMHG | TEMPERATURE: 98.3 F | HEART RATE: 56 BPM | WEIGHT: 205 LBS | RESPIRATION RATE: 18 BRPM | BODY MASS INDEX: 27.17 KG/M2 | DIASTOLIC BLOOD PRESSURE: 80 MMHG

## 2021-03-10 DIAGNOSIS — N52.9 ERECTILE DYSFUNCTION, UNSPECIFIED ERECTILE DYSFUNCTION TYPE: Primary | ICD-10-CM

## 2021-03-10 PROCEDURE — G8417 CALC BMI ABV UP PARAM F/U: HCPCS | Performed by: NURSE PRACTITIONER

## 2021-03-10 PROCEDURE — G8482 FLU IMMUNIZE ORDER/ADMIN: HCPCS | Performed by: NURSE PRACTITIONER

## 2021-03-10 PROCEDURE — 1036F TOBACCO NON-USER: CPT | Performed by: NURSE PRACTITIONER

## 2021-03-10 PROCEDURE — G8427 DOCREV CUR MEDS BY ELIG CLIN: HCPCS | Performed by: NURSE PRACTITIONER

## 2021-03-10 PROCEDURE — 99214 OFFICE O/P EST MOD 30 MIN: CPT | Performed by: NURSE PRACTITIONER

## 2021-03-10 RX ORDER — SILDENAFIL CITRATE 20 MG/1
TABLET ORAL
Qty: 30 TABLET | Refills: 0 | Status: SHIPPED | OUTPATIENT
Start: 2021-03-10 | End: 2021-03-31 | Stop reason: SDUPTHER

## 2021-03-10 NOTE — PROGRESS NOTES
[facility] MEDICINE [address]  [address] [place] [address]  Dept: [phone]  Loc: [phone]  Visit Date: 3/10/2021      Chief Complaint:   Ewa Fink Parent is a 39 y.o. male thatpresents for Other (sexual health issue  )    HPI:    Erectile Dysfunction  Symptoms have been present for couple month(s). His symptoms did not start rapidly. Symptoms occur frequent  He does not have a history of trauma to the genital region. He does have an issue with achieving erection  He does have problems with maintaining an erection    He does not have problems with sexual desire  He does not have problems with low energy level  He does have symptoms of depression or anxiety  He does not smoke, never has   He does not drink alcohol regularly  He does not report difficulties with relationship with his partner(s)  No trouble with current partner, but under a lot of stress currently due to going through a divorce. BP well controlled  No longer taking diabetic meds, sugars have been normal  Denies any trouble with chest pain, shortness of breath, dizziness, lightheadedness  He does not take oral nitrates for chest pain. No results found for: TESTOSTERONE    Lab Results   Component Value Date    PSA 0.28 05/01/2017    PSA 0.48 01/08/2015    PSA 0.29 03/04/2014       He comes in alone. History obtained from pt and medical records. I have reviewed the patient's past medical history, past surgical history, allergies,medications, social and family history and I have made updates where appropriate.     Past Medical History:   Diagnosis Date    Chronic back pain        Past Surgical History:   Procedure Laterality Date    ENDOSCOPY, COLON, DIAGNOSTIC      LYMPHADENECTOMY      1978    SLEEVE GASTRECTOMY N/A 12/11/2017    ROBOTIC GASTRECTOMY SLEEVE performed by Emilia Kaye MD at [facility]         Social History     Tobacco Use    Smoking status: Never Smoker    Smokeless tobacco: Former User     Types: Chew   Substance Use Topics    Alcohol use: No    Drug use: No       Family History   Problem Relation Age of Onset    Diabetes Mother     Thyroid Disease Mother     Obesity Mother     Obesity Father     Diabetes Sister     Obesity Sister     Diabetes Maternal Grandmother     Obesity Maternal Grandmother     Cancer Paternal Grandmother     Heart Disease Paternal Grandfather          Review of Systems  Constitutional: Negative for Fever, Chills, Fatigue  Cardiovascular:  Negative forChest Pain, Palpitations  Respiratory:  Negative for Cough, Wheezing, Shortness of breath  Gastrointestinal:  Nausea, Vomiting, Diarrhea, Constipation, Blood in stool  Genitourinary:  Negative for Difficulty or painful urination,Change in frequency, Urgency +trouble getting and maintaining an erection  Skin:  Negative for Color change, Rash, Itching, Wound  Psychiatric:  Negative for Suicidal ideation +anxiety, depression  Musculoskeletal:  Negative for Joint pain, Back pain, Gait problems  Neurological:  Negative for Dizziness, Headaches        PHYSICAL EXAM:  /80   Pulse 56   Temp 98.3 °F (36.8 °C)   Resp 18   Ht 6' 1\" (1.854 m)   Wt 205 lb (93 kg)   SpO2 98%   BMI 27.05 kg/m²     General Appearance: well developed and well- nourished, in no acute distress  Head: normocephalic and atraumatic  Eyes: pupils equal, round, and reactive to light,  conjunctivae and eye lids without erythema  ENT: external ear normal bilaterally, nose without deformity, nasal mucosa and turbinates normal without polyps, oropharynx normal, dentition is normal for age, no lip or gum lesions noted  Neck: supple and non-tender without mass, no thyromegaly or thyroid nodules, no cervical lymphadenopathy  Pulmonary/Chest: clear to auscultation bilaterally- no wheezes, rales or rhonchi, normal air movement, no respiratory distress orretractions  Cardiovascular: normal rate, regular rhythm, normal S1 and S2, no murmurs, rubs, clicks, or gallops,distal pulses intact  Abdomen: soft, non-tender, non-distended, normal bowel sounds,  no rebound or guarding, nomasses or hernias noted, no hepatospleenomegaly  Extremities: no cyanosis, clubbing or edema of the lower extremities  Musculoskeletal: No joint swelling or gross deformity   Neuro:  Alert, 5/5 strength globally and symmetrically, normal speech, no focal findings or movement disorder noted, gait wnl  Psych:  Normal affect without evidence of depression or anxiety, insight and judgement are appropriate, memoryappears intact  Skin: warm and dry, no rash or erythema  Lymph:  No cervical, auricular or supraclavicular lymph nodes palpated    ASSESSMENT & PLAN  Anurag Eid was seen today for other. Diagnoses and all orders for this visit:    Erectile dysfunction, unspecified erectile dysfunction type    Other orders  -     sildenafil (REVATIO) 20 MG tablet; Take 1-5 tabs daily as needed for erectile dysfunction    Discussed side effect profile and safety  Message me with an update in a few weeks    No follow-ups on file. Anurag Eid received counseling on the following healthy behaviors: nutrition, exercise and medication adherence  Reviewedprior labs and health maintenance. Continue current medications, diet and exercise. Discussed use, benefit, and side effects of prescribed medications. Barriers to medication compliance addressed. Patient given educationalmaterials - see patient instructions. All patient questions answered. Patient voiced understanding.      Electronically signed by SWAPNIL Goodson on 3/10/21 at 10:06 AM EST

## 2021-03-16 ENCOUNTER — TELEPHONE (OUTPATIENT)
Dept: FAMILY MEDICINE CLINIC | Age: 46
End: 2021-03-16

## 2021-03-31 ENCOUNTER — PATIENT MESSAGE (OUTPATIENT)
Dept: FAMILY MEDICINE CLINIC | Age: 46
End: 2021-03-31

## 2021-03-31 DIAGNOSIS — N52.9 ERECTILE DYSFUNCTION, UNSPECIFIED ERECTILE DYSFUNCTION TYPE: Primary | ICD-10-CM

## 2021-03-31 RX ORDER — SILDENAFIL CITRATE 20 MG/1
TABLET ORAL
Qty: 30 TABLET | Refills: 5 | Status: SHIPPED | OUTPATIENT
Start: 2021-03-31 | End: 2021-10-30 | Stop reason: SDUPTHER

## 2021-05-24 RX ORDER — ISOPROPYL ALCOHOL 70 ML/100ML
SWAB TOPICAL
Qty: 200 EACH | Refills: 11 | Status: SHIPPED | OUTPATIENT
Start: 2021-05-24

## 2021-06-14 RX ORDER — BUPROPION HYDROCHLORIDE 300 MG/1
300 TABLET ORAL EVERY MORNING
Qty: 30 TABLET | Refills: 0 | Status: SHIPPED | OUTPATIENT
Start: 2021-06-14 | End: 2021-09-29 | Stop reason: SDUPTHER

## 2021-09-27 ENCOUNTER — NURSE ONLY (OUTPATIENT)
Dept: FAMILY MEDICINE CLINIC | Age: 46
End: 2021-09-27

## 2021-09-27 DIAGNOSIS — J01.90 ACUTE SINUSITIS, RECURRENCE NOT SPECIFIED, UNSPECIFIED LOCATION: Primary | ICD-10-CM

## 2021-09-27 DIAGNOSIS — J01.90 ACUTE SINUSITIS, RECURRENCE NOT SPECIFIED, UNSPECIFIED LOCATION: ICD-10-CM

## 2021-09-28 LAB
SARS-COV-2: DETECTED
SOURCE: ABNORMAL

## 2021-09-29 ENCOUNTER — VIRTUAL VISIT (OUTPATIENT)
Dept: PSYCHIATRY | Age: 46
End: 2021-09-29
Payer: COMMERCIAL

## 2021-09-29 DIAGNOSIS — F41.1 GAD (GENERALIZED ANXIETY DISORDER): ICD-10-CM

## 2021-09-29 DIAGNOSIS — F33.0 MILD EPISODE OF RECURRENT MAJOR DEPRESSIVE DISORDER (HCC): Primary | ICD-10-CM

## 2021-09-29 PROCEDURE — G8417 CALC BMI ABV UP PARAM F/U: HCPCS | Performed by: NURSE PRACTITIONER

## 2021-09-29 PROCEDURE — 99213 OFFICE O/P EST LOW 20 MIN: CPT | Performed by: NURSE PRACTITIONER

## 2021-09-29 PROCEDURE — G8428 CUR MEDS NOT DOCUMENT: HCPCS | Performed by: NURSE PRACTITIONER

## 2021-09-29 PROCEDURE — 1036F TOBACCO NON-USER: CPT | Performed by: NURSE PRACTITIONER

## 2021-09-29 RX ORDER — BUPROPION HYDROCHLORIDE 300 MG/1
300 TABLET ORAL EVERY MORNING
Qty: 90 TABLET | Refills: 0 | Status: SHIPPED | OUTPATIENT
Start: 2021-09-29 | End: 2022-01-05 | Stop reason: SDUPTHER

## 2021-09-29 NOTE — PROGRESS NOTES
Geeta Reyes Parent, was evaluated through a synchronous (real-time) audio-video encounter. The patient (or guardian if applicable) is aware that this is a billable service. Verbal consent to proceed has been obtained within the past 12 months. The visit was conducted pursuant to the emergency declaration under the [facility], [address] [place] [address] authority and the [employer] General Act. Patient identification was verified, and a caregiver was present when appropriate. The patient was located in a state where the provider was credentialed to provide care. Total time spent for this encounter: 22 minutes    --SWAPNIL Jones CNP on 9/29/2021 at 6:58 PM    An electronic signature was used to authenticate this note. Progress Note             9-    CC: Major Depressive D/O recurrent Mild  JOHN     HPI   Kianna Valentine is seen virtually for F/U and medication management. Kianna Valentine is a transfer client from [employer] Rxed Wellbutrin XL. Reports med is working well without side effects. Good med compliance is reported. Denies having depression or anxiety. Denies feelings of harm towards self or others. Reports appetite and sleep are \"good. \"  Reports getting along well with girlfriend. 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 Report may be  Interested in counseling. Labs including lipids reviewed drawn 12/2020 Reflect no critical abnormals. Reports had gastric sleeve done 12/2020 and is very happy with results. Past Medical Hx:     Past Medical History:   Diagnosis Date    Chronic back pain    No Known Allergies     Past Psychiatric Hx:  Denies any past psych hospitalizations.  Denies any past suicidal gestures Prio client of [facility] here at [facility] Hx:  Reports mother is alcoholic      Social Hx:  TOBACCO: Denies use of tobacco products  ETOH: Reports drinks small amount 2x monthly   DRUGS: Denies edna of illicit substances   MARITAL STATUS:  and  x1. Reports has girlfriend and gets along well. 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   LIVING SITUATION: Reports lives in [place], [place] and has 5 Bio children and 2 step children has shared parenting      MSE:  Level of consciousness: Alert  Appearance: in chair and fair grooming   Behavior/Motor: no abnormalities noted   Attitude toward examiner: cooperative   Speech: Normal volume, goal directed, NRR  Mood: Euthymic  Affect: Reactive  Thought processes: Linear and goal directed   Suicidal Ideation: Denies suicidal ideations  Homicidal ideation: Denies homicidal ideations  Delusions: No evidence of delusions is observed  Perceptual Disturbance: Denies AH/VH;  No evidence of psychosis is observed. Cognition: Oriented to person, place, time and situation   Concentration fair   Memory intact   Insight: Fair  Judgment: Fair     ROS:  Constitutional: Negative for appetite change, diaphoresis, fatigue and fever. HENT: Negative for congestion, sore throat and tinnitus. Eyes: Negative for visual disturbance. Respiratory: Negative for cough, shortness of breath and wheezing. Cardiovascular: Negative for chest pain and leg swelling. Gastrointestinal: Negative for nausea, vomiting, diarrhea. Negative for abdominal pain. Reports had gastric sleeve done 12/2020 and is very happy with   results. Genitourinary: Negative for frequency. Musculoskeletal: Negative for arthralgias, myalgias and neck stiffness. Skin: Negative for puritis. Neurological: Negative for dizziness, weakness and headaches. All other systems reviewed and are negative. Impression:  Major Depressive D/O recurrent Mild  JOHN     Plan:   Cont Wellbutrin XL 300mg po q am   Med education given.  Adam Serna voiced understanding   Advised to pursue counseling   RTC 3 mos

## 2021-10-30 DIAGNOSIS — N52.9 ERECTILE DYSFUNCTION, UNSPECIFIED ERECTILE DYSFUNCTION TYPE: ICD-10-CM

## 2021-11-01 RX ORDER — SILDENAFIL CITRATE 20 MG/1
TABLET ORAL
Qty: 30 TABLET | Refills: 5 | Status: SHIPPED | OUTPATIENT
Start: 2021-11-01 | End: 2022-03-28

## 2022-01-05 ENCOUNTER — VIRTUAL VISIT (OUTPATIENT)
Dept: PSYCHIATRY | Age: 47
End: 2022-01-05
Payer: COMMERCIAL

## 2022-01-05 DIAGNOSIS — F41.1 GAD (GENERALIZED ANXIETY DISORDER): ICD-10-CM

## 2022-01-05 DIAGNOSIS — F33.0 MILD EPISODE OF RECURRENT MAJOR DEPRESSIVE DISORDER (HCC): Primary | ICD-10-CM

## 2022-01-05 PROCEDURE — G8428 CUR MEDS NOT DOCUMENT: HCPCS | Performed by: NURSE PRACTITIONER

## 2022-01-05 PROCEDURE — G8484 FLU IMMUNIZE NO ADMIN: HCPCS | Performed by: NURSE PRACTITIONER

## 2022-01-05 PROCEDURE — 99212 OFFICE O/P EST SF 10 MIN: CPT | Performed by: NURSE PRACTITIONER

## 2022-01-05 PROCEDURE — 1036F TOBACCO NON-USER: CPT | Performed by: NURSE PRACTITIONER

## 2022-01-05 PROCEDURE — G8417 CALC BMI ABV UP PARAM F/U: HCPCS | Performed by: NURSE PRACTITIONER

## 2022-01-05 RX ORDER — BUPROPION HYDROCHLORIDE 300 MG/1
300 TABLET ORAL EVERY MORNING
Qty: 90 TABLET | Refills: 0 | Status: SHIPPED | OUTPATIENT
Start: 2022-01-05 | End: 2022-04-06 | Stop reason: SDUPTHER

## 2022-01-05 NOTE — PROGRESS NOTES
Gerhardt Osler Parent, was evaluated through a synchronous (real-time) audio-video encounter. The patient (or guardian if applicable) is aware that this is a billable service. Verbal consent to proceed has been obtained within the past 12 months. The visit was conducted pursuant to the emergency declaration under the [facility], [address] [place] [address] authority and the [employer] General Act. Patient identification was verified, and a caregiver was present when appropriate. The patient was located in a state where the provider was credentialed to provide care. Total time spent for this encounter: 10 minutes     --SWAPNIL Quick CNP on 1/5/2022 at 5:26 PM    An electronic signature was used to authenticate this note. Progress Note                                                                                   CC: Major Depressive D/O recurrent Mild  JOHN     HPI   Katie Bloom is seen virtually for F/U and medication management. Reports Wellbutrin XL  is working [employer] well\" l without side effects. Good med compliance is reported. Denies having depression or anxiety. Denies feelings of harm towards self or others. Reports appetite and sleep remain  \"good. \"  Reports still  getting along well with girlfriend. 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 Reports kids did real well during holidays being first yo since divorce. Has shared parenting. Denies need for counseling. Labs including lipids reviewed drawn 12/2020 Reflect no critical abnormals.      Past Medical Hx:     Past Medical History        Past Medical History:   Diagnosis Date    Chronic back pain        No Known Allergies      Past Psychiatric Hx:  Denies any past psych hospitalizations.  Denies any past suicidal gestures Prio client of [facility] Batt[facility] here at . Yael [facility] [address] Hx:  Reports mother is alcoholic      Social Hx:  TOBACCO: Denies use of tobacco products  ETOH: Reports drinks small amount 2x monthly   DRUGS: Denies edna of illicit substances   MARITAL STATUS:  and  x1. Reports has girlfriend and gets along well. [address] [place] [employer] coordinator for [address] has Masters in [address] [facility] [place] lives in [place], [place] and has 5 Bio children and 2 step children has shared parenting      MSE:  Level of consciousness: Alert  Appearance: in chair and fair grooming   Behavior/Motor: no abnormalities noted   Attitude toward examiner: cooperative   Speech: Normal volume, goal directed, NRR  Mood: Euthymic  Affect: Reactive  Thought processes: Linear and goal directed   Suicidal Ideation: Denies suicidal ideations  Homicidal ideation: Denies homicidal ideations  Delusions: No evidence of delusions is observed  Perceptual Disturbance: Denies AH/VH;  No evidence of psychosis is observed. Cognition: Oriented to person, place, time and situation   Concentration fair   Memory intact   Insight: Fair  Judgment: Fair     ROS:  Constitutional: Negative for appetite change, diaphoresis, fatigue and fever. HENT: Negative for congestion, sore throat and tinnitus.    Eyes: Negative for visual disturbance. Respiratory: Negative for cough, shortness of breath and wheezing.    Cardiovascular: Negative for chest pain and leg swelling. Gastrointestinal: Negative for nausea, vomiting, diarrhea. Negative for abdominal pain. Reports had gastric sleeve done 12/2020 and is very happy with   results. Genitourinary: Negative for frequency. Musculoskeletal: Negative for arthralgias, myalgias and neck stiffness. Skin: Negative for puritis. Neurological: Negative for dizziness, weakness and headaches. All other systems reviewed and are negative.     Impression:  Major Depressive D/O recurrent Mild  JOHN     Plan:   Cont Wellbutrin XL 300mg po q am   Med education given.  Selena Stauffer voiced understanding Denies need for counseling  CBC with Diff,, BMP, Hepatic draw asap   RTC 3 mos

## 2022-03-28 DIAGNOSIS — N52.9 ERECTILE DYSFUNCTION, UNSPECIFIED ERECTILE DYSFUNCTION TYPE: ICD-10-CM

## 2022-03-28 RX ORDER — SILDENAFIL CITRATE 20 MG/1
TABLET ORAL
Qty: 30 TABLET | Refills: 0 | Status: SHIPPED | OUTPATIENT
Start: 2022-03-28 | End: 2022-04-25

## 2022-04-06 ENCOUNTER — TELEMEDICINE (OUTPATIENT)
Dept: PSYCHIATRY | Age: 47
End: 2022-04-06
Payer: COMMERCIAL

## 2022-04-06 DIAGNOSIS — F41.1 GAD (GENERALIZED ANXIETY DISORDER): ICD-10-CM

## 2022-04-06 DIAGNOSIS — F33.0 MILD EPISODE OF RECURRENT MAJOR DEPRESSIVE DISORDER (HCC): Primary | ICD-10-CM

## 2022-04-06 PROCEDURE — 1036F TOBACCO NON-USER: CPT | Performed by: NURSE PRACTITIONER

## 2022-04-06 PROCEDURE — 99212 OFFICE O/P EST SF 10 MIN: CPT | Performed by: NURSE PRACTITIONER

## 2022-04-06 PROCEDURE — G8421 BMI NOT CALCULATED: HCPCS | Performed by: NURSE PRACTITIONER

## 2022-04-06 PROCEDURE — G8428 CUR MEDS NOT DOCUMENT: HCPCS | Performed by: NURSE PRACTITIONER

## 2022-04-06 RX ORDER — BUPROPION HYDROCHLORIDE 300 MG/1
300 TABLET ORAL EVERY MORNING
Qty: 90 TABLET | Refills: 0 | Status: SHIPPED | OUTPATIENT
Start: 2022-04-06 | End: 2022-09-04 | Stop reason: SDUPTHER

## 2022-04-06 NOTE — PROGRESS NOTES
Janet Frank Parent, was evaluated through a synchronous (real-time) audio-video encounter. The patient (or guardian if applicable) is aware that this is a billable service, which includes applicable co-pays. This Virtual Visit was conducted with patient's (and/or legal guardian's) consent. The visit was conducted pursuant to the emergency declaration under the [facility] [address] and the [employer] General Act. Patient identification was verified, and a caregiver was present when appropriate. The patient was located at home in a state where the provider was licensed to provide care. Total time spent for this encounter: 12 minutes    --SWAPNIL Palacio CNP on 4/6/2022 at 5:24 PM    An electronic signature was used to authenticate this note. Progress Note                                                                              [id number]     CC: Major Depressive D/O recurrent Mild  JOHN     HPI   Nonda Goodell is seen virtually for F/U and medication management. States his Wellbutrin XL  is still  working [employer]"  without side effects. Good med compliance is reported. Denies having depression or anxiety. Denies feelings of harm towards self or others. Reports appetite and sleep are still   \"good. \"  Reports still  getting along well with fiance and reports They will get  in July 2022. 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 Reports still  Has shared parenting. Denies need for counseling. Labs including lipids reviewed drawn 12/2020 Reflect no critical abnormals. Reports did not get labs done ordered for Jan 2022     Past Medical Hx:     Past Medical History           Past Medical History:   Diagnosis Date    Chronic back pain        No Known Allergies      Past Psychiatric Hx:  Denies any past psych hospitalizations.  Denies any past suicidal gestures Prio client of [facility]NP Battles here at [facility]     Family Hx:  Reports mother is alcoholic      Social Hx:  TOBACCO: Denies use of tobacco products  ETOH: Reports drinks small amount 2x monthly   DRUGS: Denies edna of illicit substances   MARITAL STATUS:  and  x1. Reports has girlfriend and gets along well.   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 in [address] [place]ner lives in [facility] and has 5 Bio children and 2 step children has shared parenting      MSE:  Level of consciousness: Alert  Appearance: in chair and fair grooming   Behavior/Motor: no abnormalities noted   Attitude toward examiner: cooperative   Speech: Normal volume, goal directed, NRR  Mood: Euthymic  Affect: Reactive  Thought processes: Linear and goal directed   Suicidal Ideation: Denies suicidal ideations  Homicidal ideation: Denies homicidal ideations  Delusions: No evidence of delusions is observed  Perceptual Disturbance: Denies AH/VH;  No evidence of psychosis is observed. Cognition: Oriented to person, place, time and situation   Concentration fair   Memory intact   Insight: Fair  Judgment: Fair     ROS:  Constitutional: Negative for appetite change, diaphoresis, fatigue and fever. HENT: Negative for congestion, sore throat and tinnitus.    Eyes: Negative for visual disturbance. Respiratory: Negative for cough, shortness of breath and wheezing.    Cardiovascular: Negative for chest pain and leg swelling.    Gastrointestinal: Negative for nausea, vomiting, diarrhea. Negative for abdominal pain. Reports had gastric sleeve done 12/2020 and is very happy with   results. Genitourinary: Negative for frequency. Musculoskeletal: Negative for arthralgias, myalgias and neck stiffness. Skin: Negative for puritis. Neurological: Negative for dizziness, weakness and headaches.    All other systems reviewed and are negative.     Impression:  Major Depressive D/O recurrent Mild - stable   JOHN - stable      Plan:   Cont Wellbutrin XL 300mg po q am   Med education given. Muriel Huynh voiced understanding   Denies need for counseling  Reports did not get labs done CBC with Diff,, BMP, Hepatic advised to have drawn asap   Murile Huynh advised to call 911 and or go to nearest ED if symptoms worsen or has feelings of harm towards self or others. Muriel Huynh voiced understanding agreement with safety plan.   RTC 3 mos ; sooner if needed    Time spent = 10 minutes

## 2022-04-24 DIAGNOSIS — N52.9 ERECTILE DYSFUNCTION, UNSPECIFIED ERECTILE DYSFUNCTION TYPE: ICD-10-CM

## 2022-04-25 RX ORDER — SILDENAFIL CITRATE 20 MG/1
TABLET ORAL
Qty: 30 TABLET | Refills: 3 | Status: SHIPPED | OUTPATIENT
Start: 2022-04-25 | End: 2022-07-27

## 2022-07-26 DIAGNOSIS — N52.9 ERECTILE DYSFUNCTION, UNSPECIFIED ERECTILE DYSFUNCTION TYPE: ICD-10-CM

## 2022-07-27 RX ORDER — SILDENAFIL CITRATE 20 MG/1
TABLET ORAL
Qty: 30 TABLET | Refills: 0 | Status: SHIPPED | OUTPATIENT
Start: 2022-07-27 | End: 2022-08-15

## 2022-08-14 DIAGNOSIS — N52.9 ERECTILE DYSFUNCTION, UNSPECIFIED ERECTILE DYSFUNCTION TYPE: ICD-10-CM

## 2022-08-15 RX ORDER — SILDENAFIL CITRATE 20 MG/1
TABLET ORAL
Qty: 30 TABLET | Refills: 3 | Status: SHIPPED | OUTPATIENT
Start: 2022-08-15 | End: 2022-10-21 | Stop reason: SDUPTHER

## 2022-09-04 DIAGNOSIS — S39.012A BACK STRAIN, INITIAL ENCOUNTER: ICD-10-CM

## 2022-09-06 RX ORDER — BUPROPION HYDROCHLORIDE 300 MG/1
300 TABLET ORAL EVERY MORNING
Qty: 90 TABLET | Refills: 0 | Status: SHIPPED | OUTPATIENT
Start: 2022-09-06 | End: 2022-10-19 | Stop reason: SDUPTHER

## 2022-09-06 RX ORDER — TIZANIDINE 4 MG/1
4 TABLET ORAL EVERY 8 HOURS PRN
Qty: 30 TABLET | Refills: 3 | Status: SHIPPED | OUTPATIENT
Start: 2022-09-06

## 2022-09-06 NOTE — TELEPHONE ENCOUNTER
Patient called regarding Rx request. Reports he has been out of medication for one week and was told this morning that his Rx was sent to the pharmacy. Reports he just received a call from the pharmacy stating that a fax was received and Rx was denied. Reports he was advised that he had to call  office. Rx request was received via [employer] 09/04/2022. Office was closed 09/05/2022 for the holiday and request was forwarded to [facility] today for review. Patient was scheduled for a follow up 10/19/2022. Advised that [facility] is in the office on Wednesdays and a message will be sent to him for review.

## 2022-09-06 NOTE — TELEPHONE ENCOUNTER
Wilian Washington Parent is requesting a refill on the following medications:  Requested Prescriptions     Pending Prescriptions Disp Refills    buPROPion (WELLBUTRIN XL) 300 MG extended release tablet 90 tablet 0     Sig: Take 1 tablet by mouth every morning     Records indicate patient is due for an appointment. "[employer] message sent requesting patient to contact the office to schedule follow up.      Date of last visit: 4/6/2022  Date of next visit (if applicable):Visit date not found  Pharmacy Name: [facility]      ThanksFlorian [place]

## 2022-10-19 ENCOUNTER — TELEMEDICINE (OUTPATIENT)
Dept: PSYCHIATRY | Age: 47
End: 2022-10-19
Payer: COMMERCIAL

## 2022-10-19 DIAGNOSIS — F33.0 MILD EPISODE OF RECURRENT MAJOR DEPRESSIVE DISORDER (HCC): Primary | ICD-10-CM

## 2022-10-19 DIAGNOSIS — F41.1 GAD (GENERALIZED ANXIETY DISORDER): ICD-10-CM

## 2022-10-19 PROCEDURE — G8421 BMI NOT CALCULATED: HCPCS | Performed by: NURSE PRACTITIONER

## 2022-10-19 PROCEDURE — G8428 CUR MEDS NOT DOCUMENT: HCPCS | Performed by: NURSE PRACTITIONER

## 2022-10-19 PROCEDURE — 99214 OFFICE O/P EST MOD 30 MIN: CPT | Performed by: NURSE PRACTITIONER

## 2022-10-19 PROCEDURE — 1036F TOBACCO NON-USER: CPT | Performed by: NURSE PRACTITIONER

## 2022-10-19 PROCEDURE — G8484 FLU IMMUNIZE NO ADMIN: HCPCS | Performed by: NURSE PRACTITIONER

## 2022-10-19 RX ORDER — BUPROPION HYDROCHLORIDE 300 MG/1
300 TABLET ORAL EVERY MORNING
Qty: 90 TABLET | Refills: 0 | Status: SHIPPED | OUTPATIENT
Start: 2022-10-19

## 2022-10-19 NOTE — PROGRESS NOTES
[place] Parent, was evaluated through a synchronous (real-time) audio-video encounter. The patient (or guardian if applicable) is aware that this is a billable service, which includes applicable co-pays. This Virtual Visit was conducted with patient's (and/or legal guardian's) consent. The visit was conducted pursuant to the emergency declaration under the [facility], [address] [place] [address] authority and the [employer] and [employer] General Act. Patient identification was verified, and a caregiver was present when appropriate. The patient was located at [facility]  Provider was located at [facility]       Total time spent for this encounter: RADHA    --SWAPNIL Mejía CNP on 10/19/2022 at 5:36 PM    An electronic signature was used to authenticate this note. Progress Note                                                                              10-     CC: Major Depressive D/O recurrent Mild  JOHN     HPI   Paulo Ruano is seen virtually for F/U and medication management. Reports his Wellbutrin XL  is still  working well without side effects. Good med compliance is reported. Denies having depression or anxiety. Denies feelings of harm towards self or others. Reports still eating and sleeping well. Reports got  in July 2022. Reports getting along well with wife. 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 Reports still  Has shared parenting that is working well with ex. Denies need for counseling. Labs including lipids reviewed drawn 12/2020 Reflect no critical abnormals. Reports did not get labs done yet ordered for Jan 2022     Past Medical Hx:     Past Medical History           Past Medical History:   Diagnosis Date    Chronic back pain        No Known Allergies      Past Psychiatric Hx:  Denies any past psych hospitalizations.  Denies any past suicidal gestures Prio client of CNP Battles here at [facility]     Family Hx:  Reports mother is alcoholic      Social Hx:  TOBACCO: Denies use of tobacco products  ETOH: Reports drinks small amount 2x monthly   DRUGS: Denies edna of illicit substances   MARITAL STATUS:  and  x1. Reports has girlfriend and gets along well. 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   LIVING SITUATION: Reports lives in [place], [place] and has 5 Bio children and 2 step children has shared parenting      MSE:  Level of consciousness: Alert  Appearance: in chair and fair grooming   Behavior/Motor: no abnormalities noted   Attitude toward examiner: cooperative   Speech: Normal volume, goal directed, NRR  Mood: Euthymic  Affect: Reactive  Thought processes: Linear and goal directed   Suicidal Ideation: Denies suicidal ideations  Homicidal ideation: Denies homicidal ideations  Delusions: No evidence of delusions is observed  Perceptual Disturbance: Denies AH/VH;  No evidence of psychosis is observed. Cognition: Oriented to person, place, time and situation   Concentration fair   Memory intact   Insight: Fair  Judgment: Fair     ROS:  Constitutional: Negative for appetite change, diaphoresis, fatigue and fever. HENT: Negative for congestion, sore throat and tinnitus. Eyes: Negative for visual disturbance. Respiratory: Negative for cough, shortness of breath and wheezing. Cardiovascular: Negative for chest pain and leg swelling. Gastrointestinal: Negative for nausea, vomiting, diarrhea. Negative for abdominal pain. Reports had gastric sleeve done 12/2020 and is very happy with   results. Genitourinary: Negative for frequency. Musculoskeletal: Negative for arthralgias, myalgias and neck stiffness. Skin: Negative for puritis. Neurological: Negative for dizziness, weakness and headaches. All other systems reviewed and are negative.      Impression:  Major Depressive D/O recurrent Mild - stable JOHN - stable      Plan:   Cont Wellbutrin XL 300mg po q am   Med education given. Beka Bush voiced understanding   Denies need for counseling  Reports did not get labs done again CBC with Diff,, BMP, Hepatic advised to have drawn asap   Beka Bush advised to call 911 and or go to nearest ED if symptoms worsen or has feelings of harm towards self or others. Beka Bush voiced understanding agreement with safety plan.   RTC 3 mos ; sooner if needed

## 2022-10-21 DIAGNOSIS — N52.9 ERECTILE DYSFUNCTION, UNSPECIFIED ERECTILE DYSFUNCTION TYPE: ICD-10-CM

## 2022-10-21 RX ORDER — SILDENAFIL CITRATE 20 MG/1
20 TABLET ORAL DAILY PRN
Qty: 30 TABLET | Refills: 3 | Status: SHIPPED | OUTPATIENT
Start: 2022-10-21

## 2022-11-14 DIAGNOSIS — K21.9 GASTROESOPHAGEAL REFLUX DISEASE, UNSPECIFIED WHETHER ESOPHAGITIS PRESENT: ICD-10-CM

## 2022-11-14 RX ORDER — OMEPRAZOLE 20 MG/1
CAPSULE, DELAYED RELEASE ORAL
Qty: 90 CAPSULE | Refills: 3 | Status: SHIPPED | OUTPATIENT
Start: 2022-11-14

## 2022-12-21 ENCOUNTER — HOSPITAL ENCOUNTER (OUTPATIENT)
Age: 47
Discharge: HOME | End: 2022-12-21
Payer: COMMERCIAL

## 2022-12-21 DIAGNOSIS — F33.0 MILD EPISODE OF RECURRENT MAJOR DEPRESSIVE DISORDER (HCC): ICD-10-CM

## 2022-12-21 DIAGNOSIS — F41.1 GAD (GENERALIZED ANXIETY DISORDER): ICD-10-CM

## 2022-12-21 LAB
ALBUMIN SERPL-MCNC: 4.5 G/DL (ref 3.5–5.1)
ALP BLD-CCNC: 63 U/L (ref 38–126)
ALT SERPL-CCNC: 22 U/L (ref 11–66)
ANION GAP SERPL CALCULATED.3IONS-SCNC: 11 MEQ/L (ref 8–16)
AST SERPL-CCNC: 19 U/L (ref 5–40)
BASOPHILS # BLD: 0.4 %
BASOPHILS ABSOLUTE: 0 THOU/MM3 (ref 0–0.1)
BILIRUB SERPL-MCNC: 0.9 MG/DL (ref 0.3–1.2)
BILIRUBIN DIRECT: < 0.2 MG/DL (ref 0–0.3)
BUN BLDV-MCNC: 14 MG/DL (ref 7–22)
CALCIUM SERPL-MCNC: 9.5 MG/DL (ref 8.5–10.5)
CHLORIDE BLD-SCNC: 105 MEQ/L (ref 98–111)
CO2: 27 MEQ/L (ref 23–33)
CREAT SERPL-MCNC: 1 MG/DL (ref 0.4–1.2)
EOSINOPHIL # BLD: 0.9 %
EOSINOPHILS ABSOLUTE: 0 THOU/MM3 (ref 0–0.4)
ERYTHROCYTE [DISTWIDTH] IN BLOOD BY AUTOMATED COUNT: 12.1 % (ref 11.5–14.5)
ERYTHROCYTE [DISTWIDTH] IN BLOOD BY AUTOMATED COUNT: 42.9 FL (ref 35–45)
GFR SERPL CREATININE-BSD FRML MDRD: > 60 ML/MIN/1.73M2
GLUCOSE BLD-MCNC: 106 MG/DL (ref 70–108)
HCT VFR BLD CALC: 45 % (ref 42–52)
HEMOGLOBIN: 15.3 GM/DL (ref 14–18)
IMMATURE GRANS (ABS): 0.01 THOU/MM3 (ref 0–0.07)
IMMATURE GRANULOCYTES: 0.2 %
LYMPHOCYTES # BLD: 40.3 %
LYMPHOCYTES ABSOLUTE: 1.8 THOU/MM3 (ref 1–4.8)
MCH RBC QN AUTO: 32.5 PG (ref 26–33)
MCHC RBC AUTO-ENTMCNC: 34 GM/DL (ref 32.2–35.5)
MCV RBC AUTO: 95.5 FL (ref 80–94)
MONOCYTES # BLD: 7.8 %
MONOCYTES ABSOLUTE: 0.4 THOU/MM3 (ref 0.4–1.3)
NUCLEATED RED BLOOD CELLS: 0 /100 WBC
PLATELET # BLD: 134 THOU/MM3 (ref 130–400)
PMV BLD AUTO: 10.6 FL (ref 9.4–12.4)
POTASSIUM SERPL-SCNC: 4.4 MEQ/L (ref 3.5–5.2)
RBC # BLD: 4.71 MILL/MM3 (ref 4.7–6.1)
SEG NEUTROPHILS: 50.4 %
SEGMENTED NEUTROPHILS ABSOLUTE COUNT: 2.3 THOU/MM3 (ref 1.8–7.7)
SODIUM BLD-SCNC: 143 MEQ/L (ref 135–145)
TOTAL PROTEIN: 6.7 G/DL (ref 6.1–8)
TSH SERPL DL<=0.05 MIU/L-ACNC: 1.83 UIU/ML (ref 0.4–4.2)
WBC # BLD: 4.5 THOU/MM3 (ref 4.8–10.8)

## 2022-12-21 PROCEDURE — 36415 COLL VENOUS BLD VENIPUNCTURE: CPT

## 2022-12-21 PROCEDURE — 80053 COMPREHEN METABOLIC PANEL: CPT

## 2022-12-21 PROCEDURE — 84443 ASSAY THYROID STIM HORMONE: CPT

## 2022-12-21 PROCEDURE — 85025 COMPLETE CBC W/AUTO DIFF WBC: CPT

## 2022-12-21 PROCEDURE — 82248 BILIRUBIN DIRECT: CPT

## 2023-01-25 DIAGNOSIS — N52.9 ERECTILE DYSFUNCTION, UNSPECIFIED ERECTILE DYSFUNCTION TYPE: ICD-10-CM

## 2023-01-26 RX ORDER — SILDENAFIL CITRATE 20 MG/1
TABLET ORAL
Qty: 30 TABLET | Refills: 0 | Status: SHIPPED | OUTPATIENT
Start: 2023-01-26

## 2023-02-14 DIAGNOSIS — N52.9 ERECTILE DYSFUNCTION, UNSPECIFIED ERECTILE DYSFUNCTION TYPE: ICD-10-CM

## 2023-02-14 RX ORDER — SILDENAFIL CITRATE 20 MG/1
TABLET ORAL
Qty: 30 TABLET | Refills: 0 | Status: SHIPPED | OUTPATIENT
Start: 2023-02-14

## 2023-02-16 ENCOUNTER — TELEPHONE (OUTPATIENT)
Dept: FAMILY MEDICINE CLINIC | Age: 48
End: 2023-02-16

## 2023-02-16 DIAGNOSIS — N52.9 ERECTILE DYSFUNCTION, UNSPECIFIED ERECTILE DYSFUNCTION TYPE: ICD-10-CM

## 2023-02-16 RX ORDER — SILDENAFIL CITRATE 20 MG/1
TABLET ORAL
Qty: 30 TABLET | Refills: 0 | Status: CANCELLED | OUTPATIENT
Start: 2023-02-16

## 2023-02-16 NOTE — TELEPHONE ENCOUNTER
From: Marleny Bassett Parent  To:  Office of Dr. Miguel Angel Tipton: 2/14/2023 7:21 AM EST  Subject: Medication Renewal Request    Refills have been requested for the following medications:     sildenafil (REVATIO) 20 MG tablet SWAPNIL Fisher - CNP]    Preferred pharmacy: [id number] - [id number] [facility], [address] [place] [address] [phone]

## 2023-03-02 NOTE — TELEPHONE ENCOUNTER
Анна Subramanian Parent pharmacy is requesting a refill on the following medications:  Requested Prescriptions     Pending Prescriptions Disp Refills    buPROPion (WELLBUTRIN XL) 300 MG extended release tablet 30 tablet 0     Sig: Take 1 tablet by mouth every morning       Date of last visit: 10/19/2022  Date of next visit (if applicable):3/29/2023  Pharmacy Name: [facility]      Malaika Zamora [place]

## 2023-03-04 RX ORDER — BUPROPION HYDROCHLORIDE 300 MG/1
300 TABLET ORAL EVERY MORNING
Qty: 30 TABLET | Refills: 0 | Status: SHIPPED | OUTPATIENT
Start: 2023-03-04

## 2023-03-06 DIAGNOSIS — K21.9 GASTROESOPHAGEAL REFLUX DISEASE, UNSPECIFIED WHETHER ESOPHAGITIS PRESENT: ICD-10-CM

## 2023-03-06 RX ORDER — OMEPRAZOLE 20 MG/1
20 CAPSULE, DELAYED RELEASE ORAL DAILY
Qty: 30 CAPSULE | Refills: 11 | Status: SHIPPED | OUTPATIENT
Start: 2023-03-06 | End: 2023-04-05

## 2023-03-06 NOTE — TELEPHONE ENCOUNTER
From: Tianna Norman Parent  To: Office of Gi Polk  Sent: 3/4/2023 7:47 PM EST  Subject: Medication Renewal Request    Refills have been requested for the following medications:     omeprazole (PRILOSEC) 20 MG delayed release capsule [Dr. Ariela Benitez, DO]   Patient Comment: Insurance is now only covering 30 day supplies. Can you refill with a new script for 30 dats instead of 90, please?     Preferred pharmacy: [address] [place] [address] #[id number] [facility] [address] [facility]

## 2023-03-26 DIAGNOSIS — N52.9 ERECTILE DYSFUNCTION, UNSPECIFIED ERECTILE DYSFUNCTION TYPE: ICD-10-CM

## 2023-03-27 RX ORDER — SILDENAFIL CITRATE 20 MG/1
TABLET ORAL
Qty: 30 TABLET | Refills: 0 | Status: SHIPPED | OUTPATIENT
Start: 2023-03-27

## 2023-03-29 ENCOUNTER — TELEMEDICINE (OUTPATIENT)
Dept: PSYCHIATRY | Age: 48
End: 2023-03-29
Payer: COMMERCIAL

## 2023-03-29 DIAGNOSIS — F33.0 MILD EPISODE OF RECURRENT MAJOR DEPRESSIVE DISORDER (HCC): Primary | ICD-10-CM

## 2023-03-29 DIAGNOSIS — F41.1 GAD (GENERALIZED ANXIETY DISORDER): ICD-10-CM

## 2023-03-29 PROCEDURE — G8421 BMI NOT CALCULATED: HCPCS | Performed by: NURSE PRACTITIONER

## 2023-03-29 PROCEDURE — 4004F PT TOBACCO SCREEN RCVD TLK: CPT | Performed by: NURSE PRACTITIONER

## 2023-03-29 PROCEDURE — 99214 OFFICE O/P EST MOD 30 MIN: CPT | Performed by: NURSE PRACTITIONER

## 2023-03-29 PROCEDURE — G8428 CUR MEDS NOT DOCUMENT: HCPCS | Performed by: NURSE PRACTITIONER

## 2023-03-29 PROCEDURE — G8484 FLU IMMUNIZE NO ADMIN: HCPCS | Performed by: NURSE PRACTITIONER

## 2023-03-29 RX ORDER — BUPROPION HYDROCHLORIDE 300 MG/1
300 TABLET ORAL EVERY MORNING
Qty: 90 TABLET | Refills: 0 | Status: SHIPPED | OUTPATIENT
Start: 2023-03-29

## 2023-03-29 NOTE — PROGRESS NOTES
Impression:  Major Depressive D/O recurrent Mild - stable   JOHN - stable      Plan:   Cont Wellbutrin XL 300mg po q am   Med education given. Mulu Dillard voiced understanding   Refer for for counseling  Reports did not get labs done again CBC with Diff,, BMP, Hepatic advised to have drawn asap   Mulu Dillard advised to call 911 and or go to nearest ED if symptoms worsen or has feelings of harm towards self or others. Mulu Dillard voiced understanding agreement with safety plan.   RTC 3 mos ; sooner if needed

## 2023-04-26 DIAGNOSIS — N52.9 ERECTILE DYSFUNCTION, UNSPECIFIED ERECTILE DYSFUNCTION TYPE: ICD-10-CM

## 2023-04-27 RX ORDER — SILDENAFIL CITRATE 20 MG/1
TABLET ORAL
Qty: 30 TABLET | Refills: 0 | Status: SHIPPED | OUTPATIENT
Start: 2023-04-27

## 2023-05-22 DIAGNOSIS — N52.9 ERECTILE DYSFUNCTION, UNSPECIFIED ERECTILE DYSFUNCTION TYPE: ICD-10-CM

## 2023-05-23 RX ORDER — SILDENAFIL CITRATE 20 MG/1
TABLET ORAL
Qty: 30 TABLET | Refills: 0 | Status: SHIPPED | OUTPATIENT
Start: 2023-05-23

## 2023-06-21 PROBLEM — F17.210 TOBACCO DEPENDENCE DUE TO CIGARETTES: Status: RESOLVED | Noted: 2021-01-27 | Resolved: 2023-06-21

## 2023-06-22 ENCOUNTER — OFFICE VISIT (OUTPATIENT)
Dept: FAMILY MEDICINE CLINIC | Age: 48
End: 2023-06-22
Payer: COMMERCIAL

## 2023-06-22 VITALS
WEIGHT: 216.4 LBS | DIASTOLIC BLOOD PRESSURE: 64 MMHG | HEART RATE: 54 BPM | RESPIRATION RATE: 14 BRPM | HEIGHT: 73 IN | TEMPERATURE: 97 F | BODY MASS INDEX: 28.68 KG/M2 | SYSTOLIC BLOOD PRESSURE: 126 MMHG | OXYGEN SATURATION: 98 %

## 2023-06-22 DIAGNOSIS — N52.9 ERECTILE DYSFUNCTION, UNSPECIFIED ERECTILE DYSFUNCTION TYPE: ICD-10-CM

## 2023-06-22 DIAGNOSIS — Z13.220 SCREENING FOR HYPERLIPIDEMIA: Primary | ICD-10-CM

## 2023-06-22 DIAGNOSIS — E11.9 CONTROLLED TYPE 2 DIABETES MELLITUS WITHOUT COMPLICATION, WITHOUT LONG-TERM CURRENT USE OF INSULIN (HCC): ICD-10-CM

## 2023-06-22 DIAGNOSIS — Z12.11 COLON CANCER SCREENING: ICD-10-CM

## 2023-06-22 DIAGNOSIS — R10.11 RIGHT UPPER QUADRANT ABDOMINAL PAIN: ICD-10-CM

## 2023-06-22 LAB
BACTERIA: ABNORMAL
BILIRUB UR QL STRIP: NEGATIVE
BILIRUBIN URINE: ABNORMAL
BLOOD URINE, POC: ABNORMAL
CASTS #/AREA URNS LPF: ABNORMAL /LPF
CASTS #/AREA URNS LPF: ABNORMAL /LPF
CHARACTER UR: ABNORMAL
CHARACTER, URINE: ABNORMAL
CHARCOAL URNS QL MICRO: ABNORMAL
COLOR UR: ABNORMAL
COLOR, URINE: ABNORMAL
CRYSTALS URNS QL MICRO: ABNORMAL
EPITHELIAL CELLS, UA: ABNORMAL /HPF
GLUCOSE UR QL STRIP.AUTO: NEGATIVE MG/DL
GLUCOSE URINE: NEGATIVE MG/DL
HBA1C MFR BLD: 5.5 % (ref 4.3–5.7)
HGB UR QL STRIP.AUTO: NEGATIVE
KETONES UR QL STRIP.AUTO: NEGATIVE
KETONES, URINE: NEGATIVE
LEUKOCYTE CLUMPS, URINE: ABNORMAL
LEUKOCYTE ESTERASE UR QL STRIP.AUTO: ABNORMAL
NITRITE UR QL STRIP.AUTO: NEGATIVE
NITRITE, URINE: NEGATIVE
PH UR STRIP.AUTO: 7 [PH] (ref 5–9)
PH, URINE: 7 (ref 5–9)
PROT UR STRIP.AUTO-MCNC: ABNORMAL MG/DL
PROTEIN, URINE: 30 MG/DL
RBC #/AREA URNS HPF: ABNORMAL /HPF
RENAL EPI CELLS #/AREA URNS HPF: ABNORMAL /[HPF]
SPECIFIC GRAVITY UA: 1.03 (ref 1–1.03)
SPECIFIC GRAVITY, URINE: 1.02 (ref 1–1.03)
UROBILINOGEN, URINE: 1 EU/DL (ref 0–1)
UROBILINOGEN, URINE: 2 EU/DL (ref 0–1)
WBC #/AREA URNS HPF: ABNORMAL /HPF
YEAST LIKE FUNGI URNS QL MICRO: ABNORMAL

## 2023-06-22 PROCEDURE — 99214 OFFICE O/P EST MOD 30 MIN: CPT | Performed by: NURSE PRACTITIONER

## 2023-06-22 PROCEDURE — G8427 DOCREV CUR MEDS BY ELIG CLIN: HCPCS | Performed by: NURSE PRACTITIONER

## 2023-06-22 PROCEDURE — 1036F TOBACCO NON-USER: CPT | Performed by: NURSE PRACTITIONER

## 2023-06-22 PROCEDURE — 2022F DILAT RTA XM EVC RTNOPTHY: CPT | Performed by: NURSE PRACTITIONER

## 2023-06-22 PROCEDURE — 3044F HG A1C LEVEL LT 7.0%: CPT | Performed by: NURSE PRACTITIONER

## 2023-06-22 PROCEDURE — G8419 CALC BMI OUT NRM PARAM NOF/U: HCPCS | Performed by: NURSE PRACTITIONER

## 2023-06-22 RX ORDER — SILDENAFIL CITRATE 20 MG/1
20 TABLET ORAL DAILY PRN
Qty: 30 TABLET | Refills: 11 | Status: SHIPPED | OUTPATIENT
Start: 2023-06-22 | End: 2023-06-22

## 2023-06-22 RX ORDER — SILDENAFIL CITRATE 20 MG/1
20 TABLET ORAL DAILY PRN
Qty: 50 TABLET | Refills: 11 | Status: SHIPPED | OUTPATIENT
Start: 2023-06-22 | End: 2025-02-11

## 2023-06-22 NOTE — PROGRESS NOTES
SUBJECTIVE:    Arnita Leventhal Parent is a 41 y.o. y/o male that presents with Abdominal Pain  . HPI:       Symptoms occurred  3 week(s) ago, nausea at first, then felt generalized abdominal squeezing. Had to go home from work as pain was so severe  Went away 7 hours later. Tried TUMS and pepto without relief. Happened again a few days ago, 3 hours  Location:   generalized    Description: squeezing   Provoking Factors - none  Alleviating Factors -  rest  Severity - more severe the first time, pain 7/10  Radiation: without radiation    Change in pain with eating?  no  Change in pain with BM?  no  Nausea? yes  Vomiting? no  Diarrhea? no  Constipation? no  Blood in Stools? no  Dysuria/Change in Urinary Frequency/Hematuria? no  Back Pain? no    No pain currently. Only occurred twice. Never had before. Doesn't correlate it with foods he ate  No unintentional weight loss, night sweats  No early satiety  Takes Omeprazole daily    Urine dip with protein, bilirubin, Small leuks    Review of Systems  Constitutional:   Negative for  chills, fever and changes in weight      OBJECTIVE:  /64   Pulse 54   Temp 97 °F (36.1 °C) (Temporal)   Resp 14   Ht 6' 1\" (1.854 m)   Wt 216 lb 6.4 oz (98.2 kg)   SpO2 98%   BMI 28.55 kg/m²   General Appearance: well developed and well- nourished, in no acute distressAggravating factors:   Pulmonary/Chest: clear to auscultation bilaterally- no wheezes, rales or rhonchi, normal air movement, no respiratory distress  Cardiovascular: normal rate, regular rhythm, normal S1 and S2, no murmurs, rubs, clicks, or gallops, distal pulses intact, no carotid bruits  Abdomen: soft, non-tender, non-distended, normal bowel sounds, no masses or organomegaly.  Negative MURPHYS  Extremities: no cyanosis, clubbing or edema  Musculoskeletal: No joint swelling or gross deformity   Psych:  Normal affect without evidence of depression or anxiety  Skin: warm and dry, no rash or erythema      ASSESSMENT &

## 2023-06-23 ENCOUNTER — TELEPHONE (OUTPATIENT)
Dept: FAMILY MEDICINE CLINIC | Age: 48
End: 2023-06-23

## 2023-06-23 NOTE — TELEPHONE ENCOUNTER
----- Message from SWAPNIL Spence CNP sent at 6/23/2023  7:59 AM EDT -----  Urine that was collected yesterday showed small leukocytes but no blood. Sending for culture to rule out possible UTI.

## 2023-06-23 NOTE — TELEPHONE ENCOUNTER
I called and spoke with Marlon Lockhart and verbalized understanding and had no questions at this time.

## 2023-06-25 LAB
BACTERIA UR CULT: ABNORMAL
BACTERIA UR CULT: ABNORMAL
ORGANISM: ABNORMAL

## 2023-06-26 ENCOUNTER — TELEPHONE (OUTPATIENT)
Dept: FAMILY MEDICINE CLINIC | Age: 48
End: 2023-06-26

## 2023-06-26 RX ORDER — AMPICILLIN 500 MG/1
500 CAPSULE ORAL 4 TIMES DAILY
Qty: 28 CAPSULE | Refills: 0 | Status: SHIPPED | OUTPATIENT
Start: 2023-06-26 | End: 2023-07-03

## 2023-06-28 ENCOUNTER — HOSPITAL ENCOUNTER (OUTPATIENT)
Age: 48
Discharge: HOME | End: 2023-06-28
Payer: COMMERCIAL

## 2023-06-28 ENCOUNTER — APPOINTMENT (OUTPATIENT)
Dept: ULTRASOUND IMAGING | Age: 48
End: 2023-06-28
Payer: COMMERCIAL

## 2023-06-28 ENCOUNTER — TELEMEDICINE (OUTPATIENT)
Dept: PSYCHIATRY | Age: 48
End: 2023-06-28
Payer: COMMERCIAL

## 2023-06-28 DIAGNOSIS — F41.1 GAD (GENERALIZED ANXIETY DISORDER): ICD-10-CM

## 2023-06-28 DIAGNOSIS — R10.11 RIGHT UPPER QUADRANT ABDOMINAL PAIN: ICD-10-CM

## 2023-06-28 DIAGNOSIS — Z13.220 SCREENING FOR HYPERLIPIDEMIA: ICD-10-CM

## 2023-06-28 DIAGNOSIS — F33.0 MILD EPISODE OF RECURRENT MAJOR DEPRESSIVE DISORDER (HCC): Primary | ICD-10-CM

## 2023-06-28 LAB
ALBUMIN SERPL BCG-MCNC: 4.6 G/DL (ref 3.5–5.1)
ALP SERPL-CCNC: 68 U/L (ref 38–126)
ALT SERPL W/O P-5'-P-CCNC: 20 U/L (ref 11–66)
ANION GAP SERPL CALC-SCNC: 11 MEQ/L (ref 8–16)
AST SERPL-CCNC: 24 U/L (ref 5–40)
BACTERIA: NORMAL
BASOPHILS ABSOLUTE: 0 THOU/MM3 (ref 0–0.1)
BASOPHILS NFR BLD AUTO: 0.6 %
BILIRUB SERPL-MCNC: 0.4 MG/DL (ref 0.3–1.2)
BILIRUB UR QL STRIP: NEGATIVE
BUN SERPL-MCNC: 13 MG/DL (ref 7–22)
CALCIUM SERPL-MCNC: 9.1 MG/DL (ref 8.5–10.5)
CASTS #/AREA URNS LPF: NORMAL /LPF
CASTS #/AREA URNS LPF: NORMAL /LPF
CHARACTER UR: CLEAR
CHARCOAL URNS QL MICRO: NORMAL
CHLORIDE SERPL-SCNC: 102 MEQ/L (ref 98–111)
CHOLEST SERPL-MCNC: 195 MG/DL (ref 100–199)
CO2 SERPL-SCNC: 27 MEQ/L (ref 23–33)
COLOR UR: YELLOW
CREAT SERPL-MCNC: 1 MG/DL (ref 0.4–1.2)
CRYSTALS URNS QL MICRO: NORMAL
DEPRECATED RDW RBC AUTO: 45.1 FL (ref 35–45)
EOSINOPHIL NFR BLD AUTO: 1.3 %
EOSINOPHILS ABSOLUTE: 0.1 THOU/MM3 (ref 0–0.4)
EPITHELIAL CELLS, UA: NORMAL /HPF
ERYTHROCYTE [DISTWIDTH] IN BLOOD BY AUTOMATED COUNT: 12.5 % (ref 11.5–14.5)
GFR SERPL CREATININE-BSD FRML MDRD: > 60 ML/MIN/1.73M2
GLUCOSE SERPL-MCNC: 108 MG/DL (ref 70–108)
GLUCOSE UR QL STRIP.AUTO: NEGATIVE MG/DL
HCT VFR BLD AUTO: 47.9 % (ref 42–52)
HDLC SERPL-MCNC: 54 MG/DL
HGB BLD-MCNC: 15.6 GM/DL (ref 14–18)
HGB UR QL STRIP.AUTO: NEGATIVE
IMM GRANULOCYTES # BLD AUTO: 0 THOU/MM3 (ref 0–0.07)
IMM GRANULOCYTES NFR BLD AUTO: 0 %
KETONES UR QL STRIP.AUTO: NEGATIVE
LDLC SERPL CALC-MCNC: 121 MG/DL
LEUKOCYTE ESTERASE UR QL STRIP.AUTO: NEGATIVE
LYMPHOCYTES ABSOLUTE: 2.2 THOU/MM3 (ref 1–4.8)
LYMPHOCYTES NFR BLD AUTO: 46.8 %
MCH RBC QN AUTO: 32.2 PG (ref 26–33)
MCHC RBC AUTO-ENTMCNC: 32.6 GM/DL (ref 32.2–35.5)
MCV RBC AUTO: 99 FL (ref 80–94)
MONOCYTES ABSOLUTE: 0.3 THOU/MM3 (ref 0.4–1.3)
MONOCYTES NFR BLD AUTO: 7.5 %
NEUTROPHILS NFR BLD AUTO: 43.8 %
NITRITE UR QL STRIP.AUTO: NEGATIVE
NRBC BLD AUTO-RTO: 0 /100 WBC
PH UR STRIP.AUTO: 6.5 [PH] (ref 5–9)
PLATELET # BLD AUTO: 148 THOU/MM3 (ref 130–400)
PMV BLD AUTO: 11 FL (ref 9.4–12.4)
POTASSIUM SERPL-SCNC: 4.1 MEQ/L (ref 3.5–5.2)
PROT SERPL-MCNC: 7 G/DL (ref 6.1–8)
PROT UR STRIP.AUTO-MCNC: NEGATIVE MG/DL
RBC # BLD AUTO: 4.84 MILL/MM3 (ref 4.7–6.1)
RBC #/AREA URNS HPF: NORMAL /HPF
RENAL EPI CELLS #/AREA URNS HPF: NORMAL /[HPF]
SEGMENTED NEUTROPHILS ABSOLUTE COUNT: 2 THOU/MM3 (ref 1.8–7.7)
SODIUM SERPL-SCNC: 140 MEQ/L (ref 135–145)
SPECIFIC GRAVITY UA: 1.02 (ref 1–1.03)
TRIGL SERPL-MCNC: 100 MG/DL (ref 0–199)
UROBILINOGEN, URINE: 1 EU/DL (ref 0–1)
WBC # BLD AUTO: 4.6 THOU/MM3 (ref 4.8–10.8)
WBC #/AREA URNS HPF: NORMAL /HPF
YEAST LIKE FUNGI URNS QL MICRO: NORMAL

## 2023-06-28 PROCEDURE — 80053 COMPREHEN METABOLIC PANEL: CPT

## 2023-06-28 PROCEDURE — 1036F TOBACCO NON-USER: CPT | Performed by: NURSE PRACTITIONER

## 2023-06-28 PROCEDURE — 81001 URINALYSIS AUTO W/SCOPE: CPT

## 2023-06-28 PROCEDURE — 85025 COMPLETE CBC W/AUTO DIFF WBC: CPT

## 2023-06-28 PROCEDURE — 99214 OFFICE O/P EST MOD 30 MIN: CPT | Performed by: NURSE PRACTITIONER

## 2023-06-28 PROCEDURE — G8419 CALC BMI OUT NRM PARAM NOF/U: HCPCS | Performed by: NURSE PRACTITIONER

## 2023-06-28 PROCEDURE — G8428 CUR MEDS NOT DOCUMENT: HCPCS | Performed by: NURSE PRACTITIONER

## 2023-06-28 PROCEDURE — 36415 COLL VENOUS BLD VENIPUNCTURE: CPT

## 2023-06-28 PROCEDURE — 80061 LIPID PANEL: CPT

## 2023-06-28 RX ORDER — BUPROPION HYDROCHLORIDE 300 MG/1
300 TABLET ORAL EVERY MORNING
Qty: 90 TABLET | Refills: 0 | Status: SHIPPED | OUTPATIENT
Start: 2023-06-28

## 2023-06-29 ENCOUNTER — TELEPHONE (OUTPATIENT)
Dept: FAMILY MEDICINE CLINIC | Age: 48
End: 2023-06-29

## 2023-06-29 DIAGNOSIS — R31.29 MICROSCOPIC HEMATURIA: Primary | ICD-10-CM

## 2023-07-05 ENCOUNTER — HOSPITAL ENCOUNTER (OUTPATIENT)
Dept: ULTRASOUND IMAGING | Age: 48
Discharge: HOME | End: 2023-07-05
Payer: COMMERCIAL

## 2023-07-05 DIAGNOSIS — R10.11 RIGHT UPPER QUADRANT ABDOMINAL PAIN: ICD-10-CM

## 2023-07-05 PROCEDURE — 76705 ECHO EXAM OF ABDOMEN: CPT

## 2023-07-06 ENCOUNTER — TELEPHONE (OUTPATIENT)
Dept: FAMILY MEDICINE CLINIC | Age: 48
End: 2023-07-06

## 2023-07-06 DIAGNOSIS — K21.9 GASTROESOPHAGEAL REFLUX DISEASE, UNSPECIFIED WHETHER ESOPHAGITIS PRESENT: ICD-10-CM

## 2023-07-06 NOTE — TELEPHONE ENCOUNTER
----- Message from SWAPNIL Mejía CNP sent at 7/5/2023  5:40 PM EDT -----  US of gallbladder is normal    The dark tarry stools are concerning, and I had read in his EGD report that he did have small ulcer back in 2017 and they recommend rechecking this in 5 years. Would he be opposed to seeing GI again since he is having symptoms ? Dr Loi Mena? Id like to increase his omeprazole to 40 mg daily as well.

## 2023-07-06 NOTE — TELEPHONE ENCOUNTER
Amada Silveira calling for clarification on RX slidenafil   How long is the qty 50  for and  Si-5 tablets as needed for ED ( is that in a day)

## 2023-07-07 RX ORDER — OMEPRAZOLE 40 MG/1
40 CAPSULE, DELAYED RELEASE ORAL DAILY
Qty: 30 CAPSULE | Refills: 11 | Status: SHIPPED | OUTPATIENT
Start: 2023-07-07 | End: 2024-07-01

## 2023-07-07 NOTE — TELEPHONE ENCOUNTER
I called and spoke with Sylvie Louis and he verbalized understtanding and is ok with referral back to [facility] with Dr Laron Randle.  Pt also would like omeprazole sent to [facility] in [place] Patient is having scopes done soon and he has been told to take his prevacid 60 minutes before breakfast. He states that is when he usually takes his Zytiga and wants to make sure it is okay to take them at the same time. Can you please call him back? Thanks!

## 2023-07-29 LAB — NONINV COLON CA DNA+OCC BLD SCRN STL QL: NEGATIVE

## 2023-07-31 ENCOUNTER — TELEPHONE (OUTPATIENT)
Dept: FAMILY MEDICINE CLINIC | Age: 48
End: 2023-07-31

## 2023-07-31 NOTE — TELEPHONE ENCOUNTER
----- Message from SWAPNIL Faustin CNP sent at 7/29/2023 12:12 PM EDT -----  Cologuard was negative. Will repeat in 3 years.

## 2023-07-31 NOTE — TELEPHONE ENCOUNTER
I called and spoke to Mello Buenrostro and he verbalized understanding and had no questions at this time.

## 2023-08-26 ENCOUNTER — HOSPITAL ENCOUNTER (OUTPATIENT)
Dept: CT IMAGING | Age: 48
Discharge: HOME | End: 2023-08-26
Payer: COMMERCIAL

## 2023-08-26 DIAGNOSIS — K92.1 BLOOD IN STOOL: ICD-10-CM

## 2023-08-26 DIAGNOSIS — K62.5 HEMORRHAGE OF RECTUM AND ANUS: ICD-10-CM

## 2023-08-26 DIAGNOSIS — R10.9 STOMACH ACHE: ICD-10-CM

## 2023-08-26 PROCEDURE — 6360000004 HC RX CONTRAST MEDICATION

## 2023-08-26 PROCEDURE — 74177 CT ABD & PELVIS W/CONTRAST: CPT

## 2023-08-26 RX ADMIN — IOPAMIDOL 85 ML: 755 INJECTION, SOLUTION INTRAVENOUS at 09:11

## 2023-11-13 ENCOUNTER — OFFICE VISIT (OUTPATIENT)
Dept: PSYCHOLOGY | Age: 48
End: 2023-11-13
Payer: COMMERCIAL

## 2023-11-13 DIAGNOSIS — F33.0 MAJOR DEPRESSIVE DISORDER, RECURRENT EPISODE, MILD (HCC): Primary | ICD-10-CM

## 2023-11-13 PROCEDURE — 90791 PSYCH DIAGNOSTIC EVALUATION: CPT | Performed by: PSYCHOLOGIST

## 2023-11-13 PROCEDURE — 1036F TOBACCO NON-USER: CPT | Performed by: PSYCHOLOGIST

## 2023-11-13 ASSESSMENT — PATIENT HEALTH QUESTIONNAIRE - PHQ9
6. FEELING BAD ABOUT YOURSELF - OR THAT YOU ARE A FAILURE OR HAVE LET YOURSELF OR YOUR FAMILY DOWN: 1
7. TROUBLE CONCENTRATING ON THINGS, SUCH AS READING THE NEWSPAPER OR WATCHING TELEVISION: 0
SUM OF ALL RESPONSES TO PHQ9 QUESTIONS 1 & 2: 1
1. LITTLE INTEREST OR PLEASURE IN DOING THINGS: 0
SUM OF ALL RESPONSES TO PHQ QUESTIONS 1-9: 5
2. FEELING DOWN, DEPRESSED OR HOPELESS: 1
SUM OF ALL RESPONSES TO PHQ QUESTIONS 1-9: 5
4. FEELING TIRED OR HAVING LITTLE ENERGY: 1
3. TROUBLE FALLING OR STAYING ASLEEP: 1
8. MOVING OR SPEAKING SO SLOWLY THAT OTHER PEOPLE COULD HAVE NOTICED. OR THE OPPOSITE, BEING SO FIGETY OR RESTLESS THAT YOU HAVE BEEN MOVING AROUND A LOT MORE THAN USUAL: 1
9. THOUGHTS THAT YOU WOULD BE BETTER OFF DEAD, OR OF HURTING YOURSELF: 0
5. POOR APPETITE OR OVEREATING: 0
10. IF YOU CHECKED OFF ANY PROBLEMS, HOW DIFFICULT HAVE THESE PROBLEMS MADE IT FOR YOU TO DO YOUR WORK, TAKE CARE OF THINGS AT HOME, OR GET ALONG WITH OTHER PEOPLE: 1

## 2023-11-13 ASSESSMENT — ANXIETY QUESTIONNAIRES
4. TROUBLE RELAXING: 1
6. BECOMING EASILY ANNOYED OR IRRITABLE: 3
1. FEELING NERVOUS, ANXIOUS, OR ON EDGE: 2
5. BEING SO RESTLESS THAT IT IS HARD TO SIT STILL: 2
7. FEELING AFRAID AS IF SOMETHING AWFUL MIGHT HAPPEN: 0
GAD7 TOTAL SCORE: 9
3. WORRYING TOO MUCH ABOUT DIFFERENT THINGS: 0
2. NOT BEING ABLE TO STOP OR CONTROL WORRYING: 1
IF YOU CHECKED OFF ANY PROBLEMS ON THIS QUESTIONNAIRE, HOW DIFFICULT HAVE THESE PROBLEMS MADE IT FOR YOU TO DO YOUR WORK, TAKE CARE OF THINGS AT HOME, OR GET ALONG WITH OTHER PEOPLE: SOMEWHAT DIFFICULT

## 2023-11-13 NOTE — PROGRESS NOTES
Psychotherapy Note  Dong Pineda. Tram Douglas Psy.D. Visit Date:  11/13/2023    Patient:  Kei Bermudez Parent  YOB: 1975  Chief Complaint:  New Patient, Depression, Anxiety, and Stress      Duration of session:  60 minutes      S:     Current Presenting Problem:  Ct is seeing Mainor Rico CNP for psychiatry. He's here to discuss issues around his depression and anxiety. Ct said work is a major stressor. 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 He does feel like the younger grades are doing well, and he's making a positive impact in this area. He also has a lot of kids and a lot of dogs, so his house is very loud. He would describe his life as frustrating. Ct said people don't like him, he wears people out, and his is a big thing he needs to discuss. His relationships have left him, his parents have left him, his children are leaving him, people at work are leaving him. Mood:  Ct said his mood varies, he gets frustrated easily, and gets short with people. Anxiety:  Might be more mood related. If it is anxiety, it's more frustration. Sleep:  Ct sleeps about 6 hours per night, wakes up 1-2x per night. Quality isn't great, but he's able to fall asleep pretty well. Activity Level:  None. We talked about walking the dogs, he doesn't but the kids do. Social Support:  None, he reported he has no friends. Past MH Hx:  Ct currently in psychiatry but has never been in any counseling or therapy previously. Relevant Medical Hx:  Ct had gastric bypass surgery. He used to be very overweight. Family Hx: This is ct's 2nd marriage. He  his first wife Leodan Reardon, in 2008, she had a 2 and 2 y/o at that time, they were  14 years, they had 5 kids together. He is now  to Cecile Coto, she has twins who are in high school. There are as many as 8 people in the house on a given day, and as few as 3.   His home and his ex's homes
[address]

## 2023-12-07 ENCOUNTER — TELEMEDICINE (OUTPATIENT)
Dept: PSYCHOLOGY | Age: 48
End: 2023-12-07
Payer: COMMERCIAL

## 2023-12-07 DIAGNOSIS — F33.0 MAJOR DEPRESSIVE DISORDER, RECURRENT EPISODE, MILD (HCC): Primary | ICD-10-CM

## 2023-12-07 PROCEDURE — 90834 PSYTX W PT 45 MINUTES: CPT | Performed by: PSYCHOLOGIST

## 2023-12-07 NOTE — PROGRESS NOTES
Psychotherapy Note  Melyssa Campuzano. Zoran Rosales Psy.D. Visit Date:  12/7/2023    Patient:  Iggy Howe Parent  YOB: 1975  Chief Complaint:  Follow-up, Depression, Anxiety, and Stress      Duration of session:  40 minutes      S:     Legal Hx:  None    Hx of AoD:  20 oz of coffee per day, No tobacco, nicotine use, some alcohol use, about 2 drinks per evening several times per week. He denied any other drug use. Hx of Abuse/trauma:  None    Ct said Isamar was interesting in that mom actually came, when she doesn't normally do it. He's been  1.5 years so his new in laws he's still getting used to. Fortunato Cruz has continued to be his normal self. 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 We talked about his personality, when he's a \"jerk\" and why this might be. Some suggestions were provided on things that might be helpful for this cause. O:    Appearance    Patient presents as alert, oriented, and cooperative  Appetite normal  Sleep disturbance Yes  Loss of pleasure Yes  Speech    normal rate, normal volume, well articulated, and clear and understandable  Mood    Depressed  Irritability  Affect    depressed affect  Thought Process    goal directed and linear and coherent  Insight    Fair  Judgment    Intact  Memory    recent and remote memory intact  Suicide Assessment    no suicidal ideation      A:    1. Major depressive disorder, recurrent episode, mild (HCC)        Ct's scores of depression and and anxiety were both in mild range of functioning. He's seeking psychotherapy to address depression, irritability, stress management and related issues. P:    Here's a summary of some of the things we talked about from today as well as some additional things that you might find helpful:       Elicit positive qualitites in written form (3+ things) from a few trusted   others.

## 2023-12-13 DIAGNOSIS — S39.012A BACK STRAIN, INITIAL ENCOUNTER: ICD-10-CM

## 2023-12-13 RX ORDER — TIZANIDINE 4 MG/1
4 TABLET ORAL EVERY 8 HOURS PRN
Qty: 30 TABLET | Refills: 3 | Status: SHIPPED | OUTPATIENT
Start: 2023-12-13

## 2023-12-13 NOTE — TELEPHONE ENCOUNTER
Recent Visits  Date Type Provider Dept   06/22/23 Office Visit SWAPNIL Brenner CNP [facility]   Showing recent visits within past 540 days with a meds authorizing provider and meeting all other requirements  Future Appointments  Date Type Provider Dept   12/14/23 Appointment SWAPNIL Brenner CNP [facility]   Showing future appointments within next 150 days with a meds authorizing provider and meeting all other requirements

## 2023-12-14 ENCOUNTER — OFFICE VISIT (OUTPATIENT)
Dept: FAMILY MEDICINE CLINIC | Age: 48
End: 2023-12-14
Payer: COMMERCIAL

## 2023-12-14 VITALS
DIASTOLIC BLOOD PRESSURE: 68 MMHG | OXYGEN SATURATION: 99 % | HEIGHT: 73 IN | RESPIRATION RATE: 14 BRPM | SYSTOLIC BLOOD PRESSURE: 130 MMHG | WEIGHT: 214.4 LBS | TEMPERATURE: 98 F | BODY MASS INDEX: 28.41 KG/M2 | HEART RATE: 56 BPM

## 2023-12-14 DIAGNOSIS — R41.3 MEMORY CHANGE: Primary | ICD-10-CM

## 2023-12-14 DIAGNOSIS — F32.A DEPRESSION, UNSPECIFIED DEPRESSION TYPE: ICD-10-CM

## 2023-12-14 DIAGNOSIS — R53.83 OTHER FATIGUE: ICD-10-CM

## 2023-12-14 DIAGNOSIS — R53.83 LOW ENERGY: ICD-10-CM

## 2023-12-14 PROBLEM — M75.52 BURSITIS OF LEFT SHOULDER: Status: ACTIVE | Noted: 2023-12-14

## 2023-12-14 PROBLEM — M75.82 TENDINITIS OF LEFT ROTATOR CUFF: Status: ACTIVE | Noted: 2023-12-14

## 2023-12-14 PROBLEM — M75.42 IMPINGEMENT SYNDROME OF LEFT SHOULDER REGION: Status: ACTIVE | Noted: 2023-12-14

## 2023-12-14 PROCEDURE — 99214 OFFICE O/P EST MOD 30 MIN: CPT | Performed by: NURSE PRACTITIONER

## 2023-12-14 PROCEDURE — G8427 DOCREV CUR MEDS BY ELIG CLIN: HCPCS | Performed by: NURSE PRACTITIONER

## 2023-12-14 PROCEDURE — 1036F TOBACCO NON-USER: CPT | Performed by: NURSE PRACTITIONER

## 2023-12-14 PROCEDURE — G8419 CALC BMI OUT NRM PARAM NOF/U: HCPCS | Performed by: NURSE PRACTITIONER

## 2023-12-14 PROCEDURE — G8482 FLU IMMUNIZE ORDER/ADMIN: HCPCS | Performed by: NURSE PRACTITIONER

## 2023-12-14 RX ORDER — HYDROXYZINE HYDROCHLORIDE 10 MG/1
10 TABLET, FILM COATED ORAL NIGHTLY PRN
Qty: 30 TABLET | Refills: 0 | Status: SHIPPED | OUTPATIENT
Start: 2023-12-14 | End: 2024-01-13

## 2023-12-14 RX ORDER — BUPROPION HYDROCHLORIDE 150 MG/1
150 TABLET ORAL EVERY MORNING
Qty: 30 TABLET | Refills: 3 | Status: SHIPPED | OUTPATIENT
Start: 2023-12-14

## 2023-12-14 ASSESSMENT — PATIENT HEALTH QUESTIONNAIRE - PHQ9
SUM OF ALL RESPONSES TO PHQ QUESTIONS 1-9: 16
SUM OF ALL RESPONSES TO PHQ QUESTIONS 1-9: 16
6. FEELING BAD ABOUT YOURSELF - OR THAT YOU ARE A FAILURE OR HAVE LET YOURSELF OR YOUR FAMILY DOWN: 2
5. POOR APPETITE OR OVEREATING: 1
3. TROUBLE FALLING OR STAYING ASLEEP: 2
7. TROUBLE CONCENTRATING ON THINGS, SUCH AS READING THE NEWSPAPER OR WATCHING TELEVISION: 2
4. FEELING TIRED OR HAVING LITTLE ENERGY: 3
1. LITTLE INTEREST OR PLEASURE IN DOING THINGS: 1
SUM OF ALL RESPONSES TO PHQ QUESTIONS 1-9: 15
8. MOVING OR SPEAKING SO SLOWLY THAT OTHER PEOPLE COULD HAVE NOTICED. OR THE OPPOSITE, BEING SO FIGETY OR RESTLESS THAT YOU HAVE BEEN MOVING AROUND A LOT MORE THAN USUAL: 2
10. IF YOU CHECKED OFF ANY PROBLEMS, HOW DIFFICULT HAVE THESE PROBLEMS MADE IT FOR YOU TO DO YOUR WORK, TAKE CARE OF THINGS AT HOME, OR GET ALONG WITH OTHER PEOPLE: 1
SUM OF ALL RESPONSES TO PHQ QUESTIONS 1-9: 16
2. FEELING DOWN, DEPRESSED OR HOPELESS: 2
9. THOUGHTS THAT YOU WOULD BE BETTER OFF DEAD, OR OF HURTING YOURSELF: 1
SUM OF ALL RESPONSES TO PHQ9 QUESTIONS 1 & 2: 3

## 2023-12-14 ASSESSMENT — COLUMBIA-SUICIDE SEVERITY RATING SCALE - C-SSRS
5. HAVE YOU STARTED TO WORK OUT OR WORKED OUT THE DETAILS OF HOW TO KILL YOURSELF? DO YOU INTEND TO CARRY OUT THIS PLAN?: NO
3. HAVE YOU BEEN THINKING ABOUT HOW YOU MIGHT KILL YOURSELF?: YES
4. HAVE YOU HAD THESE THOUGHTS AND HAD SOME INTENTION OF ACTING ON THEM?: NO
2. HAVE YOU ACTUALLY HAD ANY THOUGHTS OF KILLING YOURSELF?: YES
7. DID THIS OCCUR IN THE LAST THREE MONTHS: NO
6. HAVE YOU EVER DONE ANYTHING, STARTED TO DO ANYTHING, OR PREPARED TO DO ANYTHING TO END YOUR LIFE?: NO
BASED ON RESPONSES TO C-SSRS QS 1-6, WHAT IS THE PATIENT'S OVERALL RISK RATING FOR SUICIDE: MODERATE RISK
1. WITHIN THE PAST MONTH, HAVE YOU WISHED YOU WERE DEAD OR WISHED YOU COULD GO TO SLEEP AND NOT WAKE UP?: NO

## 2023-12-14 NOTE — PROGRESS NOTES
Future  - Prolactin; Future  - MRI BRAIN WO CONTRAST; Future    2. Other fatigue    - Testosterone; Future  - Follicle Stimulating Hormone; Future  - Luteinizing Hormone; Future  - Prolactin; Future    3. Low energy    - Testosterone; Future  - Follicle Stimulating Hormone; Future  - Luteinizing Hormone; Future  - Prolactin; Future    4. Depression, unspecified depression type  Not controlled  Following withy psychology and psychiatry in next  few weeks  Add 150 mg of Wellbutrin to his 300 mg  FU 2 weeks to discuss, call sooner with any worsening symptoms. - hydrOXYzine HCl (ATARAX) 10 MG tablet; Take 1 tablet by mouth nightly as needed for Anxiety  Dispense: 30 tablet; Refill: 0  - buPROPion (WELLBUTRIN XL) 150 MG extended release tablet; Take 1 tablet by mouth every morning  Dispense: 30 tablet; Refill: 3      Return in about 2 weeks (around 12/28/2023) for mood. Start above treatments  ER precautions discussed with patient. All copied or forwarded information in the progress note was verified by me to be accurate at the time of visit  Patient's past medical, surgical, social and family history were reviewed and updated     All patient questions answered. Patient voiced understanding.

## 2023-12-14 NOTE — PATIENT INSTRUCTIONS
[facility], [address] [place] [address]  [phone]        [facility]  [address] [place] [address], [address] [facility], [address] [place] [address]   [phone]

## 2023-12-15 ENCOUNTER — HOSPITAL ENCOUNTER (OUTPATIENT)
Age: 48
Discharge: HOME | End: 2023-12-15
Payer: COMMERCIAL

## 2023-12-15 DIAGNOSIS — R41.3 MEMORY CHANGE: ICD-10-CM

## 2023-12-15 DIAGNOSIS — R53.83 OTHER FATIGUE: ICD-10-CM

## 2023-12-15 DIAGNOSIS — R53.83 LOW ENERGY: ICD-10-CM

## 2023-12-15 LAB
25(OH)D3 SERPL-MCNC: 25 NG/ML (ref 30–100)
ALBUMIN SERPL BCG-MCNC: 4.4 G/DL (ref 3.5–5.1)
ALP SERPL-CCNC: 59 U/L (ref 38–126)
ALT SERPL W/O P-5'-P-CCNC: 17 U/L (ref 11–66)
ANION GAP SERPL CALC-SCNC: 12 MEQ/L (ref 8–16)
AST SERPL-CCNC: 17 U/L (ref 5–40)
BACTERIA: ABNORMAL
BASOPHILS ABSOLUTE: 0 THOU/MM3 (ref 0–0.1)
BASOPHILS NFR BLD AUTO: 0.6 %
BILIRUB SERPL-MCNC: 0.9 MG/DL (ref 0.3–1.2)
BILIRUB UR QL STRIP: NEGATIVE
BUN SERPL-MCNC: 14 MG/DL (ref 7–22)
CALCIUM SERPL-MCNC: 9.3 MG/DL (ref 8.5–10.5)
CASTS #/AREA URNS LPF: ABNORMAL /LPF
CASTS #/AREA URNS LPF: ABNORMAL /LPF
CHARACTER UR: ABNORMAL
CHARCOAL URNS QL MICRO: ABNORMAL
CHLORIDE SERPL-SCNC: 104 MEQ/L (ref 98–111)
CO2 SERPL-SCNC: 26 MEQ/L (ref 23–33)
COLOR UR: ABNORMAL
CREAT SERPL-MCNC: 1 MG/DL (ref 0.4–1.2)
CRYSTALS URNS QL MICRO: ABNORMAL
DEPRECATED RDW RBC AUTO: 44.5 FL (ref 35–45)
EOSINOPHIL NFR BLD AUTO: 1.2 %
EOSINOPHILS ABSOLUTE: 0.1 THOU/MM3 (ref 0–0.4)
EPITHELIAL CELLS, UA: ABNORMAL /HPF
ERYTHROCYTE [DISTWIDTH] IN BLOOD BY AUTOMATED COUNT: 12.7 % (ref 11.5–14.5)
GFR SERPL CREATININE-BSD FRML MDRD: > 60 ML/MIN/1.73M2
GLUCOSE SERPL-MCNC: 82 MG/DL (ref 70–108)
GLUCOSE UR QL STRIP.AUTO: NEGATIVE MG/DL
HCT VFR BLD AUTO: 46.7 % (ref 42–52)
HGB BLD-MCNC: 16 GM/DL (ref 14–18)
HGB UR QL STRIP.AUTO: NEGATIVE
IMM GRANULOCYTES # BLD AUTO: 0.01 THOU/MM3 (ref 0–0.07)
IMM GRANULOCYTES NFR BLD AUTO: 0.2 %
KETONES UR QL STRIP.AUTO: ABNORMAL
LEUKOCYTE ESTERASE UR QL STRIP.AUTO: ABNORMAL
LYMPHOCYTES ABSOLUTE: 2.1 THOU/MM3 (ref 1–4.8)
LYMPHOCYTES NFR BLD AUTO: 40.5 %
MCH RBC QN AUTO: 32.7 PG (ref 26–33)
MCHC RBC AUTO-ENTMCNC: 34.3 GM/DL (ref 32.2–35.5)
MCV RBC AUTO: 95.3 FL (ref 80–94)
MONOCYTES ABSOLUTE: 0.4 THOU/MM3 (ref 0.4–1.3)
MONOCYTES NFR BLD AUTO: 8 %
NEUTROPHILS NFR BLD AUTO: 49.5 %
NITRITE UR QL STRIP.AUTO: NEGATIVE
NRBC BLD AUTO-RTO: 0 /100 WBC
PH UR STRIP.AUTO: 6.5 [PH] (ref 5–9)
PLATELET # BLD AUTO: 153 THOU/MM3 (ref 130–400)
PMV BLD AUTO: 10.7 FL (ref 9.4–12.4)
POTASSIUM SERPL-SCNC: 4 MEQ/L (ref 3.5–5.2)
PROLACTIN SERPL-MCNC: 14.8 NG/ML
PROT SERPL-MCNC: 6.8 G/DL (ref 6.1–8)
PROT UR STRIP.AUTO-MCNC: ABNORMAL MG/DL
PSA SERPL-MCNC: 0.61 NG/ML (ref 0–1)
RBC # BLD AUTO: 4.9 MILL/MM3 (ref 4.7–6.1)
RBC #/AREA URNS HPF: ABNORMAL /HPF
RENAL EPI CELLS #/AREA URNS HPF: ABNORMAL /[HPF]
SEGMENTED NEUTROPHILS ABSOLUTE COUNT: 2.5 THOU/MM3 (ref 1.8–7.7)
SODIUM SERPL-SCNC: 142 MEQ/L (ref 135–145)
SPECIFIC GRAVITY UA: 1.02 (ref 1–1.03)
TSH SERPL DL<=0.005 MIU/L-ACNC: 2.69 UIU/ML (ref 0.4–4.2)
UROBILINOGEN, URINE: 1 EU/DL (ref 0–1)
WBC # BLD AUTO: 5.1 THOU/MM3 (ref 4.8–10.8)
WBC #/AREA URNS HPF: > 100 /HPF
YEAST LIKE FUNGI URNS QL MICRO: ABNORMAL

## 2023-12-15 PROCEDURE — 84146 ASSAY OF PROLACTIN: CPT

## 2023-12-15 PROCEDURE — 36415 COLL VENOUS BLD VENIPUNCTURE: CPT

## 2023-12-15 PROCEDURE — 85025 COMPLETE CBC W/AUTO DIFF WBC: CPT

## 2023-12-15 PROCEDURE — 84443 ASSAY THYROID STIM HORMONE: CPT

## 2023-12-15 PROCEDURE — 84403 ASSAY OF TOTAL TESTOSTERONE: CPT

## 2023-12-15 PROCEDURE — 83001 ASSAY OF GONADOTROPIN (FSH): CPT

## 2023-12-15 PROCEDURE — 83002 ASSAY OF GONADOTROPIN (LH): CPT

## 2023-12-15 PROCEDURE — 84153 ASSAY OF PSA TOTAL: CPT

## 2023-12-15 PROCEDURE — 80053 COMPREHEN METABOLIC PANEL: CPT

## 2023-12-15 PROCEDURE — 82306 VITAMIN D 25 HYDROXY: CPT

## 2023-12-15 PROCEDURE — 84207 ASSAY OF VITAMIN B-6: CPT

## 2023-12-15 PROCEDURE — 81001 URINALYSIS AUTO W/SCOPE: CPT

## 2023-12-16 LAB
FOLLICLE STIMULATING HORMONE: 3.5 MIU/ML (ref 1.5–12.4)
LUTEINIZING HORMONE: 5.6 MIU/ML (ref 1.7–8.6)

## 2023-12-17 DIAGNOSIS — R53.83 OTHER FATIGUE: ICD-10-CM

## 2023-12-17 DIAGNOSIS — R53.83 LOW ENERGY: ICD-10-CM

## 2023-12-17 DIAGNOSIS — R41.3 MEMORY CHANGE: Primary | ICD-10-CM

## 2023-12-17 LAB — TESTOST SERPL-MCNC: 551 NG/DL (ref 300–890)

## 2023-12-18 ENCOUNTER — TELEPHONE (OUTPATIENT)
Dept: FAMILY MEDICINE CLINIC | Age: 48
End: 2023-12-18

## 2023-12-18 NOTE — TELEPHONE ENCOUNTER
----- Message from SWAPNIL Burrell - CNP sent at 12/18/2023  3:05 PM EST -----  Were his labs done fasting?

## 2023-12-26 ENCOUNTER — OFFICE VISIT (OUTPATIENT)
Dept: FAMILY MEDICINE CLINIC | Age: 48
End: 2023-12-26
Payer: COMMERCIAL

## 2023-12-26 VITALS
DIASTOLIC BLOOD PRESSURE: 80 MMHG | HEIGHT: 73 IN | BODY MASS INDEX: 28.55 KG/M2 | OXYGEN SATURATION: 98 % | WEIGHT: 215.4 LBS | SYSTOLIC BLOOD PRESSURE: 158 MMHG | TEMPERATURE: 97.8 F | RESPIRATION RATE: 14 BRPM | HEART RATE: 53 BPM

## 2023-12-26 DIAGNOSIS — F32.A DEPRESSION, UNSPECIFIED DEPRESSION TYPE: ICD-10-CM

## 2023-12-26 DIAGNOSIS — R41.3 MEMORY CHANGE: ICD-10-CM

## 2023-12-26 DIAGNOSIS — L72.9 SCROTAL CYST: ICD-10-CM

## 2023-12-26 DIAGNOSIS — R07.81 RIB PAIN ON LEFT SIDE: ICD-10-CM

## 2023-12-26 DIAGNOSIS — R53.83 LOW ENERGY: Primary | ICD-10-CM

## 2023-12-26 PROCEDURE — 99214 OFFICE O/P EST MOD 30 MIN: CPT | Performed by: NURSE PRACTITIONER

## 2023-12-26 PROCEDURE — G8427 DOCREV CUR MEDS BY ELIG CLIN: HCPCS | Performed by: NURSE PRACTITIONER

## 2023-12-26 PROCEDURE — G8419 CALC BMI OUT NRM PARAM NOF/U: HCPCS | Performed by: NURSE PRACTITIONER

## 2023-12-26 PROCEDURE — 1036F TOBACCO NON-USER: CPT | Performed by: NURSE PRACTITIONER

## 2023-12-26 PROCEDURE — G8482 FLU IMMUNIZE ORDER/ADMIN: HCPCS | Performed by: NURSE PRACTITIONER

## 2023-12-26 RX ORDER — KETOROLAC TROMETHAMINE 10 MG/1
10 TABLET, FILM COATED ORAL EVERY 6 HOURS PRN
Qty: 20 TABLET | Refills: 0 | Status: SHIPPED | OUTPATIENT
Start: 2023-12-26 | End: 2024-12-25

## 2023-12-26 RX ORDER — HYDROCODONE BITARTRATE AND ACETAMINOPHEN 5; 325 MG/1; MG/1
1 TABLET ORAL EVERY 6 HOURS PRN
COMMUNITY
Start: 2023-12-25 | End: 2023-12-28

## 2023-12-26 RX ORDER — BUPROPION HYDROCHLORIDE 300 MG/1
300 TABLET ORAL EVERY MORNING
Qty: 90 TABLET | Refills: 0 | Status: SHIPPED | OUTPATIENT
Start: 2023-12-26 | End: 2023-12-27 | Stop reason: SDUPTHER

## 2023-12-26 RX ORDER — CEPHALEXIN 500 MG/1
500 CAPSULE ORAL 3 TIMES DAILY
Qty: 21 CAPSULE | Refills: 0 | Status: SHIPPED | OUTPATIENT
Start: 2023-12-26 | End: 2024-01-02

## 2023-12-26 NOTE — PROGRESS NOTES
Damion Pinedo Parent is a 57 y.o. male thatpresents for Follow-up      History obtained today from Patient and Wife. HPI    Recent fall  Slipped in shower  Having some pain in left rib area  Seen in [facility]  No LOC or denies hitting head  Taking Norco for pain still having difficulty with deep breaths or laughing/coughing      Moods  On Wellbutrin 450 mg now, which is helping was recently increased  Sleep is better  Still seeing psychology  Here today with S.O.    Skin lesion  Cyst noted 8- days ago? In groin/scrotal area  No pain  No injury  No fevers  Mild tender to palpation      I have reviewed the patient's past medical history, past surgical history, allergies,medications, social and family history and I have made updates where appropriate.     Past Medical History:   Diagnosis Date    Chronic back pain     Controlled type 2 diabetes mellitus without complication, without long-term current use of insulin ([address]) 7/23/2020       Social History     Tobacco Use    Smoking status: Never    Smokeless tobacco: Former     Types: Chew   Substance Use Topics    Alcohol use: No    Drug use: No       Family History   Problem Relation Age of Onset    Diabetes Mother     Thyroid Disease Mother     Obesity Mother     Obesity Father     Diabetes Sister     Obesity Sister     Diabetes Maternal Grandmother     Obesity Maternal Grandmother     Cancer Paternal Grandmother     Heart Disease Paternal Grandfather          Review of Systems    Review of Systems    Constitutional: Negative for Fever, Chills, Fatigue  Cardiovascular:  Negative forChest Pain, Palpitations  Respiratory:  Negative for Cough, Wheezing, Shortness of breath  Gastrointestinal:  Nausea, Vomiting, Diarrhea, Constipation, Blood in stool  Genitourinary:  Negative for Difficulty or painful urination,Change in frequency, Urgency  Skin:  Negative for Color change, Rash, Itching, +Wound  Psychiatric:  +Anxiety, + Depression, Suicidal ideation  Musculoskeletal:

## 2023-12-27 ENCOUNTER — TELEPHONE (OUTPATIENT)
Dept: FAMILY MEDICINE CLINIC | Age: 48
End: 2023-12-27

## 2023-12-27 ENCOUNTER — TELEMEDICINE (OUTPATIENT)
Dept: PSYCHIATRY | Age: 48
End: 2023-12-27
Payer: COMMERCIAL

## 2023-12-27 DIAGNOSIS — F41.1 GAD (GENERALIZED ANXIETY DISORDER): ICD-10-CM

## 2023-12-27 DIAGNOSIS — F32.A DEPRESSION, UNSPECIFIED DEPRESSION TYPE: ICD-10-CM

## 2023-12-27 DIAGNOSIS — F33.0 MILD EPISODE OF RECURRENT MAJOR DEPRESSIVE DISORDER (HCC): Primary | ICD-10-CM

## 2023-12-27 LAB
BACTERIA UR CULT: ABNORMAL
ORGANISM: ABNORMAL

## 2023-12-27 PROCEDURE — 99214 OFFICE O/P EST MOD 30 MIN: CPT | Performed by: NURSE PRACTITIONER

## 2023-12-27 RX ORDER — BUPROPION HYDROCHLORIDE 300 MG/1
300 TABLET ORAL EVERY MORNING
Qty: 90 TABLET | Refills: 0 | Status: SHIPPED | OUTPATIENT
Start: 2023-12-27

## 2023-12-27 RX ORDER — BUPROPION HYDROCHLORIDE 150 MG/1
150 TABLET ORAL EVERY MORNING
Qty: 90 TABLET | Refills: 0 | Status: SHIPPED | OUTPATIENT
Start: 2023-12-27

## 2023-12-27 NOTE — TELEPHONE ENCOUNTER
Pt called stating he had spoke to his insurance company, [address] [place] [address], regarding MRI brain w/o contrast ordered on 12/14/23 stating a prior authorization has not been started. I contacted pt's insurance @ [phone], spoke with Toni Prado. He confirmed there was not a prior authorization started for Humphrey's MRI. Toni Prado also stated all radiology prior authorizations need to be completed through [id number] [place], [place] [address]pankaj Adam  at [phone]. Called [phone] [address] , spoke with Hien Hill to complete the authorization. Spent 5 minutes on the phone with Leward Dancer regarding the MRI, this has been authorized. Authorization #: [id number]  Exp. 2/10/24    Pt has been notified.     Future Appointments   Date Time Provider [address]   12/27/2023  5:00 PM SWAPNIL Wallace CNP [facility] [address]   12/28/2023 12:00 PM STR MRI RM1 STRZ MRI STR Rad/Card   1/4/2024  9:00 AM Дмитрий Power [id number] SHERIDANP - Amanda Bey   1/18/2024  9:00 AM Дмитрий Power [id number] [address]   2/26/2024  2:40 PM Karri Bermudez APRN - CNP [place] [id number] [facility]

## 2023-12-27 NOTE — PROGRESS NOTES
done 12/2020 and is very happy with   results. Genitourinary: UTI Rxed antibiotic. Musculoskeletal: Negative for arthralgias, myalgias and neck stiffness. Skin: Negative for puritis. Neurological: Negative for dizziness, weakness and headaches. All other systems reviewed and are negative. Impression:  Major Depressive D/O recurrent Mild - stable   JOHN - stable      Plan:   Cont Wellbutrin XL 450mg po q am   Med education given. Quinten Durham voiced understanding   Will continue on counseling list.   Quinten Durham advised to call 911 and or go to nearest ED if symptoms worsen or has feelings of harm towards self or others. Quinten Durham voiced understanding agreement with safety plan.   RTC 3 mos ; sooner if needed

## 2023-12-28 ENCOUNTER — HOSPITAL ENCOUNTER (OUTPATIENT)
Dept: MRI IMAGING | Age: 48
Discharge: HOME | End: 2023-12-28
Payer: COMMERCIAL

## 2023-12-28 DIAGNOSIS — R41.3 MEMORY CHANGE: ICD-10-CM

## 2023-12-28 PROCEDURE — 70551 MRI BRAIN STEM W/O DYE: CPT

## 2023-12-29 ENCOUNTER — TELEPHONE (OUTPATIENT)
Dept: FAMILY MEDICINE CLINIC | Age: 48
End: 2023-12-29

## 2023-12-29 NOTE — TELEPHONE ENCOUNTER
I called and spoke to Louisa Del Rosario and she verbalized understanding and had no questions at this time.

## 2023-12-29 NOTE — TELEPHONE ENCOUNTER
----- Message from [employer], APRN - CNP sent at 12/29/2023 11:47 AM EST -----  MRI of brain negative. No acute changes or cause of memory changes noted.

## 2024-01-02 ENCOUNTER — TELEPHONE (OUTPATIENT)
Dept: FAMILY MEDICINE CLINIC | Age: 49
End: 2024-01-02

## 2024-01-02 NOTE — TELEPHONE ENCOUNTER
----- Message from SWAPNIL Martinez - CNP sent at 1/2/2024  8:06 AM EST -----  Urine with mixed growth  He is on Keflex, is his groin cyst improving?

## 2024-01-04 ENCOUNTER — TELEMEDICINE (OUTPATIENT)
Dept: PSYCHOLOGY | Age: 49
End: 2024-01-04
Payer: COMMERCIAL

## 2024-01-04 DIAGNOSIS — F33.0 MAJOR DEPRESSIVE DISORDER, RECURRENT EPISODE, MILD (HCC): Primary | ICD-10-CM

## 2024-01-04 PROCEDURE — 90837 PSYTX W PT 60 MINUTES: CPT | Performed by: PSYCHOLOGIST

## 2024-01-04 NOTE — PROGRESS NOTES
recurrent episode, mild (HCC)        Ct's scores of depression and and anxiety were both in mild range of functioning.  He's seeking psychotherapy to address depression, irritability, stress management and related issues.       P:    MyChart visit 2-4 weeks.    Humphrey Woodward, was evaluated through a synchronous (real-time) audio-video encounter. The patient (or guardian if applicable) is aware that this is a billable service, which includes applicable co-pays. This Virtual Visit was conducted with patient's (and/or legal guardian's) consent. Patient identification was verified, and a caregiver was present when appropriate.   The patient was located at Home: [address] [place] [id number]  Provider was located at Home (Appt Dept State): [place]             All questions about treatment plan answered.Patient instructed to go immediately to the emergency room and/or call 911 if any suicidal or homicidal ideations. Patient stated understanding and is agreeable to treatment and crisis plan.        Provider Signature:  Electronically signed by Laurent Root PSYD on 1/4/2024 at 9:11 AM

## 2024-02-15 ENCOUNTER — TELEMEDICINE (OUTPATIENT)
Dept: PSYCHOLOGY | Age: 49
End: 2024-02-15
Payer: COMMERCIAL

## 2024-02-15 DIAGNOSIS — F33.0 MAJOR DEPRESSIVE DISORDER, RECURRENT EPISODE, MILD (HCC): Primary | ICD-10-CM

## 2024-02-15 PROCEDURE — 90837 PSYTX W PT 60 MINUTES: CPT | Performed by: PSYCHOLOGIST

## 2024-02-15 NOTE — PROGRESS NOTES
Patient: Mike Carrillo Date: 2023   : 1945    77 year old male      INPATIENT WOUND CARE PROGRESS NOTE    Supervising Wound Care / Hyperbaric Medicine Physician: Not Applicable  Consulting Provider:  Diana Carnes PA-C  Date of Consultation/Last Comprehensive Exam:  23  Referring  Provider:  Dr. Sanford    SUBJECTIVE:    Chief Complaint:  Multiple pressure ulcers, MASD      Wound/Ulcer Present:    Pressure ulcer, lower extremity:  Current Vascular Assessment:  Physical exam  Pressure ulcer, other sites  Other, MASD    Additional Wound Category:  None     Maximum Baseline Ambulatory Status:  Walks with walker    History of Present Illness:  This is a 77 year old male with a past medical history significant for HTN, HLD, a fib, alcohol abuse, MI, CHF, CAD, CKD    The pt presented with weakness. He was found to be in a fib w/ RVR and lactic acidosis. Per chart review he was not able to ambulate or get out of his chair. He states he spends most of his time in his chair or in bed with his feet on the bed or dependent. He notes tenderness from his heels. He does not wear compression, elevate his legs or wear prevalon boots at home. He states he lives independently. He states he does clean himself but doesn't say how frequently. He does not have any incite into his wounds, questionable ability to care for them.     Wound care was consulted to evaluate and provide recommendations for multiple pressure ulcers, present on admission and MASD (chronic).      Interval History 23  The pt was seen for wound check and dressing change. The pt was found wearing prevalon boots. He has no questions or concerns today. His wounds have improved but his abdominal folds are .     Current Treatment Regimen:  Dressing:  see below   Frequency:  Daily   Changed by:  Staff/bedside nurse    Review of Systems:  Pertinent items are noted in HPI (history of present illness).    Past Medical History:  Psychotherapy Note  Laurent Root Psy.D.  Visit Date:  2/15/2024    Patient:  Humphrey Woodward  YOB: 1975  Chief Complaint:  Follow-up, Depression, and Stress      Duration of session:  55 minutes      S:     Ct said he's really worried about his car, not performing well, and he's scared to drive it.  They have money problems, and their 14 y/o daughter keeps asking for money, and doesn't understand.      We talked about family issues, dynamics with his wife, her ex, his ex, and the kids.  We talked about how he feels stuck in the generativity vs stagnation phase of psychosocial development.  He believes in what he's doing, but it isn't always met with favor from those around him.  He was encouraged to keep doing what he's doing.  He may not be aware of the positive impacts he's making but it's there.        O:    Appearance    Patient presents as alert, oriented, and cooperative  Appetite normal  Sleep disturbance Yes  Loss of pleasure Yes  Speech    normal rate, normal volume, well articulated, and clear and understandable  Mood    Depressed  Irritability  Affect    depressed affect  Thought Process    goal directed and linear and coherent  Insight    Fair  Judgment    Intact  Memory    recent and remote memory intact  Suicide Assessment    no suicidal ideation      A:    1. Major depressive disorder, recurrent episode, mild (HCC)        Ct's scores of depression and and anxiety were both in mild range of functioning.  He's seeking psychotherapy to address depression, irritability, stress management and related issues.       P:    MyChart visit in 2-4 weeks.    Humphrey Woodward, was evaluated through a synchronous (real-time) audio-video encounter. The patient (or guardian if applicable) is aware that this is a billable service, which includes applicable co-pays. This Virtual Visit was conducted with patient's (and/or legal guardian's) consent. Patient identification was verified, and a caregiver was    Diagnosis Date   • Alcohol abuse    • Anxiety    • Arthritis    • Atrial fibrillation (CMS/HCC)    • Cardiomyopathy (CMS/HCC)    • Chronic kidney disease    • Chronic pain     Patient denies 5/22/22   • Congestive cardiac failure (CMS/HCC)    • Coronary artery disease    • Depression    • Essential (primary) hypertension    • Fracture    • High cholesterol    • Myocardial infarction (CMS/HCC)     Per patient Afib, CHF   • Pneumonia    • Urinary incontinence    • Urinary tract infection      Past Surgical History:   Procedure Laterality Date   • Joint replacement Right 2012    Dr. Napoleon Starr RTKA    • Joint replacement Left 2015    Dr. Starr LTKA    • Joint replacement Right 2018    right total hip, Dr. Starr   • Tonsillectomy     • Vascular surgery       Social History     Socioeconomic History   • Marital status:      Spouse name: Not on file   • Number of children: Not on file   • Years of education: Not on file   • Highest education level: Not on file   Occupational History   • Not on file   Tobacco Use   • Smoking status: Former     Packs/day: 1.50     Years: 10.00     Pack years: 15.00     Types: Cigarettes   • Smokeless tobacco: Never   • Tobacco comments:     quit over 25 years ago   Vaping Use   • Vaping Use: never used   Substance and Sexual Activity   • Alcohol use: Yes     Alcohol/week: 3.0 - 4.0 standard drinks     Types: 3 - 4 Shots of liquor per week     Comment: 5-6  drinks/day of gin   • Drug use: No   • Sexual activity: Not on file   Other Topics Concern   •  Service Not Asked   • Blood Transfusions Not Asked   • Caffeine Concern Not Asked   • Occupational Exposure Not Asked   • Hobby Hazards Not Asked   • Sleep Concern Not Asked   • Stress Concern Not Asked   • Weight Concern Not Asked   • Special Diet Not Asked   • Back Care Not Asked   • Exercise Not Asked   • Bike Helmet Not Asked   • Seat Belt Yes   • Self-Exams Not Asked   Social History Narrative   • Not on file      Social Determinants of Health     Financial Resource Strain: Low Risk    • Social Determinants: Financial Resource Strain: None   Food Insecurity: Food Insecurity Present   • Social Determinants: Food Insecurity: Sometimes   Transportation Needs: No Transportation Needs   • Lack of Transportation (Medical): No   • Lack of Transportation (Non-Medical): No   Physical Activity: Not on file   Stress: Not on file   Social Connections: Somewhat Isolated   • Social Determinants: Social Connections: 1 or 2 times a week   Intimate Partner Violence: Not At Risk   • Social Determinants: Intimate Partner Violence Past Fear: No   • Social Determinants: Intimate Partner Violence Current Fear: No     Family History   Problem Relation Age of Onset   • Heart disease Mother    • COPD Mother    • Heart disease Father    • Diabetes Father        Current Facility-Administered Medications   Medication   • phenazopyridine (PYRIDIUM) tablet 100 mg   • cefTRIAXone (ROCEPHIN) syringe 2,000 mg   • primidone (MYSOLINE) tablet 25 mg   • midodrine (PROAMATINE) tablet 10 mg   • sodium chloride 0.9 % flush bag 25 mL   • sodium chloride (PF) 0.9 % injection 2 mL   • metoPROLOL tartrate (LOPRESSOR) tablet 25 mg   • melatonin tablet 3 mg   • albuterol inhaler 2 puff   • sodium chloride (NORMAL SALINE) 0.9 % bolus 500 mL   • enoxaparin (LOVENOX) injection 40 mg   • LORazepam (ATIVAN) tablet 2 mg    Or   • LORazepam (ATIVAN) injection 2 mg    Or   • LORazepam (ATIVAN) injection 2 mg   • cloNIDine (CATAPRES) tablet 0.1 mg   • sodium chloride 0.9 % flush bag 25 mL   • Potassium Standard Replacement Protocol (Levels 3.5 and lower)   • Magnesium Standard Replacement Protocol   • Potassium Replacement (Levels 3.6 - 4)   • ondansetron (ZOFRAN) injection 4 mg   • acetaminophen (TYLENOL) tablet 650 mg   • polyethylene glycol (MIRALAX) packet 17 g   • docusate sodium-sennosides (SENOKOT S) 50-8.6 MG 2 tablet   • bisacodyl (DULCOLAX) suppository 10 mg   •  traMADol (ULTRAM) tablet 50 mg   • nystatin (MYCOSTATIN) powder   • aspirin (ECOTRIN) enteric coated tablet 81 mg   • atorvastatin (LIPITOR) tablet 20 mg   • digoxin (LANOXIN) tablet 125 mcg   • folic acid (FOLATE) tablet 1 mg   • gabapentin (NEURONTIN) capsule 300 mg   • magnesium oxide (MAG-OX) tablet 400 mg   • mirtazapine (REMERON) tablet 15 mg      ALLERGIES:  Patient has no known allergies.    OBJECTIVE:  Vital Signs:    Visit Vitals  /77 (BP Location: LUE - Left upper extremity, Patient Position: Semi-Amaya's)   Pulse 85   Temp 97.5 °F (36.4 °C) (Oral)   Resp 18   Ht 5' 9\" (1.753 m)   Wt 103.9 kg (229 lb 0.9 oz)   SpO2 96%   BMI 33.83 kg/m²       Physical Exam:    General appearance: Alert, oriented to person, place, time and situation, in no distress and cooperative  Head: normocephalic without obvious abnormality  Pulmonary: normal respiratory effort  Wound: see below    MASD noted to the abdominal skin folds, BL groin folds, BL breast folds have resolved.      DTIs noted to the BL heels, L heel is improving. R heel with fluctuant hemorrhagic blister, deflated today, expressed about 5mL blood, overlying skin left intact. Periwound without signs of infection. Non-tender     BL proximal thighs, gluteal cleft have healed.     Abdominal groin folds            R heel (pre deflation)      L heel         Wound Bed Quality:  see above      Brittany-wound Quality:    see above    Additional Descriptors:  see above    Wound Measurements Per Flowsheet:       Wound Groin Excoriation (Active)   Number of days: 81       Wound Abdomen Right Puncture (Active)   Number of days: 69       Wound Buttock Fissure (Active)   Wound Length (cm) 2 cm 01/24/23 1434   Wound Width (cm) 0.4 cm 01/24/23 1434   Wound Depth (cm) 0.1 cm 01/24/23 1434   Wound Surface Area (cm^2) 0.8 cm^2 01/24/23 1434   Wound Volume (cm^3) 0.08 cm^3 01/24/23 1434   Number of days: 63       Wound Heel Right (Active)   Wound Care Team Consult Date 01/24/23  01/24/23 1434   Wound Length (cm) 6.5 cm 02/07/23 1421   Wound Width (cm) 4.8 cm 02/07/23 1421   Wound Depth (cm) 0 cm 02/07/23 1421   Wound Surface Area (cm^2) 31.2 cm^2 02/07/23 1421   Wound Volume (cm^3) 0 cm^3 02/07/23 1421   Number of days: 14       Wound Heel Left Pressure Injury (Active)   Wound Care Team Consult Date 01/24/23 01/24/23 1434   Wound Length (cm) 3.2 cm 02/07/23 1421   Wound Width (cm) 2.5 cm 02/07/23 1421   Wound Depth (cm) 0 cm 02/07/23 1421   Wound Surface Area (cm^2) 8 cm^2 02/07/23 1421   Wound Volume (cm^3) 0 cm^3 02/07/23 1421   Number of days: 14     PROCEDURE:  None performed  Not indicated    Procedure was Performed by:  Not applicable    Laboratory assessments reviewed:  No results found for: PAB   Albumin (g/dL)   Date Value   01/25/2023 2.4 (L)   01/24/2023 2.3 (L)   01/23/2023 3.0 (L)      No results available in last 24 hours    Lab Results   Component Value Date    WBC 8.1 02/04/2023    GLUCOSE 88 02/07/2023    HGBA1C 5.5 10/30/2021    CREATININE 0.94 02/07/2023    GFRA 72 12/27/2019    GFRNA 62 12/27/2019        Culture results:  Specimen Description (no units)   Date Value   12/23/2019 BLOOD BLOOD ANTECUBITAL,LEFT   12/23/2019 BLOOD, PERIPHERAL ANTECUBITAL,RIGHT   12/23/2019 URINE, CLEAN CATCH/MIDSTREAM   09/30/2019 BLOOD, PERIPHERAL ANTECUBITAL,RIGHT    CULTURE (no units)   Date Value   12/23/2019 NO GROWTH 5 DAYS.   12/23/2019 NO GROWTH 5 DAYS.   12/23/2019 10,000 TO 50,000 CFU/mL PROTEUS MIRABILIS (P)   09/30/2019 NO GROWTH 5 DAYS.        Diagnostic Assessments Reviewed:  No new update    Nutritional Assessment:  Prealbumin and/or Albumin reviewed    Wound treatment goals are palliative:  No    DIAGNOSES:  Pressure ulcer of the lower extremity, stage Deep Tissue Injury BL heels  Pressure ulcer, other site, stage III cleft fissure  Context issues: alcoholism  Protein malnutrition  MASD    Medical Decision Making:   Multiple pressure injuries to the BLE, buttocks and MASD to  the skin folds.   It is questionable if the pt can adequately take care of himself at this point.   This pt would be an excellent candidate for alternating pressure pump added to the bed.   These pumps are not available at this time. The pt will need aggressive offloading.     2/7 - pts MASD is improving, all areas healed except the abdominal folds. The buttocks/thighs have healed. L heel continues to improve. R heel has evolved to a hemorrhagic blister, deflated today leaving overlying tissue intact. No signs of infection. Will need to continue encouraging aggressive offloading.     Local Wound Care:  Skin folds  Wash with mild soap and water, pat dry.   Dressings: Apply nystatin powder to the affected skin folds, gently wipe away excess to prevent clumping in the skin folds. Place interdry sheets into the skin folds, leaving a 2 inch \"wick\" outside of the skin fold.    Change Frequency: apply powder BID and prn     R heel: Betadine daily and prn     Imaging & Vascular  No additional Imaging needed at this time.   If does not clinically improve with above care, can consider additional imaging in the future.    Edema  Encouraged to elevate their legs as much as possible    Heel above heart when laying    Heel above hip when sitting     Infectious Disease:  No clinical signs of infection on exam today    Offloading:  Pt needs aggressive off-loading   Frequent turning/repositioning at least every 2 hours  Limit sitting to 1hr with meals, repositioning at least every 15min   Offloading chair cushion  Heel offloading boots at all times while in bed      Nutrition/ Diabetes:  Encourage high protein diet for wound healing.    Registered dietician following, appreciate recommendations.    Not diabetic     Albumin (g/dL)   Date Value   01/25/2023 2.4 (L)      Hemoglobin A1C (%)   Date Value   10/30/2021 5.5      Discharge Planning:   The pt will need follow up in clinic 1-2wks after discharge  Dressings to be done by TBD -  pending disposition  Please reach out for discharge planning as discharge approaches    Will follow     Plan of Care:  Advanced Wound Care Recommendations:  See above  Percent Wound Closure from consult:  Consult 1/24/23  Care plan to augment wound closure: Not applicable.       Thank you for the opportunity to participate in the care of this patient.     Significant note data copied forward from my prior note and reviewed by me as needed in the formulation of this note and plan.    Diana Carnes PA-C  [facility]  [place] M/W [phone]   [facility] T/F [phone]

## 2024-02-26 ENCOUNTER — OFFICE VISIT (OUTPATIENT)
Dept: FAMILY MEDICINE CLINIC | Age: 49
End: 2024-02-26
Payer: COMMERCIAL

## 2024-02-26 VITALS
SYSTOLIC BLOOD PRESSURE: 122 MMHG | RESPIRATION RATE: 12 BRPM | DIASTOLIC BLOOD PRESSURE: 80 MMHG | HEART RATE: 63 BPM | BODY MASS INDEX: 28.89 KG/M2 | WEIGHT: 218 LBS | TEMPERATURE: 98.3 F | OXYGEN SATURATION: 97 % | HEIGHT: 73 IN

## 2024-02-26 DIAGNOSIS — M79.645 PAIN OF LEFT MIDDLE FINGER: Primary | ICD-10-CM

## 2024-02-26 DIAGNOSIS — F32.A DEPRESSION, UNSPECIFIED DEPRESSION TYPE: ICD-10-CM

## 2024-02-26 DIAGNOSIS — L72.9 SCROTAL CYST: ICD-10-CM

## 2024-02-26 PROCEDURE — G8427 DOCREV CUR MEDS BY ELIG CLIN: HCPCS | Performed by: NURSE PRACTITIONER

## 2024-02-26 PROCEDURE — 99214 OFFICE O/P EST MOD 30 MIN: CPT | Performed by: NURSE PRACTITIONER

## 2024-02-26 PROCEDURE — 1036F TOBACCO NON-USER: CPT | Performed by: NURSE PRACTITIONER

## 2024-02-26 PROCEDURE — G8419 CALC BMI OUT NRM PARAM NOF/U: HCPCS | Performed by: NURSE PRACTITIONER

## 2024-02-26 PROCEDURE — G8482 FLU IMMUNIZE ORDER/ADMIN: HCPCS | Performed by: NURSE PRACTITIONER

## 2024-02-26 SDOH — ECONOMIC STABILITY: FOOD INSECURITY: WITHIN THE PAST 12 MONTHS, THE FOOD YOU BOUGHT JUST DIDN'T LAST AND YOU DIDN'T HAVE MONEY TO GET MORE.: SOMETIMES TRUE

## 2024-02-26 SDOH — ECONOMIC STABILITY: FOOD INSECURITY: WITHIN THE PAST 12 MONTHS, YOU WORRIED THAT YOUR FOOD WOULD RUN OUT BEFORE YOU GOT MONEY TO BUY MORE.: SOMETIMES TRUE

## 2024-02-26 SDOH — ECONOMIC STABILITY: INCOME INSECURITY: HOW HARD IS IT FOR YOU TO PAY FOR THE VERY BASICS LIKE FOOD, HOUSING, MEDICAL CARE, AND HEATING?: SOMEWHAT HARD

## 2024-02-26 SDOH — ECONOMIC STABILITY: HOUSING INSECURITY
IN THE LAST 12 MONTHS, WAS THERE A TIME WHEN YOU DID NOT HAVE A STEADY PLACE TO SLEEP OR SLEPT IN A SHELTER (INCLUDING NOW)?: NO

## 2024-02-26 SDOH — ECONOMIC STABILITY: TRANSPORTATION INSECURITY
IN THE PAST 12 MONTHS, HAS LACK OF TRANSPORTATION KEPT YOU FROM MEETINGS, WORK, OR FROM GETTING THINGS NEEDED FOR DAILY LIVING?: NO

## 2024-02-26 ASSESSMENT — PATIENT HEALTH QUESTIONNAIRE - PHQ9
SUM OF ALL RESPONSES TO PHQ9 QUESTIONS 1 & 2: 0
1. LITTLE INTEREST OR PLEASURE IN DOING THINGS: 0
SUM OF ALL RESPONSES TO PHQ QUESTIONS 1-9: 0
5. POOR APPETITE OR OVEREATING: 0
3. TROUBLE FALLING OR STAYING ASLEEP: 0
2. FEELING DOWN, DEPRESSED OR HOPELESS: 0
10. IF YOU CHECKED OFF ANY PROBLEMS, HOW DIFFICULT HAVE THESE PROBLEMS MADE IT FOR YOU TO DO YOUR WORK, TAKE CARE OF THINGS AT HOME, OR GET ALONG WITH OTHER PEOPLE: 0
SUM OF ALL RESPONSES TO PHQ QUESTIONS 1-9: 0
SUM OF ALL RESPONSES TO PHQ QUESTIONS 1-9: 0
9. THOUGHTS THAT YOU WOULD BE BETTER OFF DEAD, OR OF HURTING YOURSELF: 0
6. FEELING BAD ABOUT YOURSELF - OR THAT YOU ARE A FAILURE OR HAVE LET YOURSELF OR YOUR FAMILY DOWN: 0
SUM OF ALL RESPONSES TO PHQ QUESTIONS 1-9: 0
7. TROUBLE CONCENTRATING ON THINGS, SUCH AS READING THE NEWSPAPER OR WATCHING TELEVISION: 0
8. MOVING OR SPEAKING SO SLOWLY THAT OTHER PEOPLE COULD HAVE NOTICED. OR THE OPPOSITE, BEING SO FIGETY OR RESTLESS THAT YOU HAVE BEEN MOVING AROUND A LOT MORE THAN USUAL: 0
4. FEELING TIRED OR HAVING LITTLE ENERGY: 0

## 2024-02-26 NOTE — PROGRESS NOTES
Humphrey Woodwadr is a 48 y.o. male thatpresents for Follow-up      History obtained today from Patient.    HPI    Left hand pain   2 months ago had fall in shower in which he injured left hand  Xray was negative in ER for fracture  Still with pain, swelling of left middle finger, Loss of  strength  Pain with ROM  Swollen PIP joint      Moods  On Wellbutrin 450 mg daily  Following with psychiatry and psychology  Has Eliminated some stress  Less financial stress  Last couple of weeks have been better  Less brain fog and memory issues  Was treated for UTI last visit and thinks that may have helped    Scrotal lesion/cyst  Trial of Keflex and Doxycycline without much improvement in lesion. He reports it maybe smaller in size but also has drainage from time to time. No pain  Offered urology referral vs imaging  Will hold off at this time and discuss with wife    I have reviewed the patient's past medical history, past surgical history, allergies,medications, social and family history and I have made updates where appropriate.    Past Medical History:   Diagnosis Date    Chronic back pain     Controlled type 2 diabetes mellitus without complication, without long-term current use of insulin ([facility]) 7/23/2020       Social History     Tobacco Use    Smoking status: Never    Smokeless tobacco: Former     Types: Chew   Substance Use Topics    Alcohol use: No    Drug use: No       Family History   Problem Relation Age of Onset    Diabetes Mother     Thyroid Disease Mother     Obesity Mother     Obesity Father     Diabetes Sister     Obesity Sister     Diabetes Maternal Grandmother     Obesity Maternal Grandmother     Cancer Paternal Grandmother     Heart Disease Paternal Grandfather          Review of Systems    Review of Systems    Constitutional: Negative for Fever, Chills, Fatigue  Cardiovascular:  Negative forChest Pain, Palpitations  Respiratory:  Negative for Cough, Wheezing, Shortness of breath  Gastrointestinal:  Nausea,

## 2024-04-04 DIAGNOSIS — F32.A DEPRESSION, UNSPECIFIED DEPRESSION TYPE: ICD-10-CM

## 2024-04-04 NOTE — TELEPHONE ENCOUNTER
Humphrey ESCOBEDO Parent is requesting a refill on the following medications:  Requested Prescriptions     Pending Prescriptions Disp Refills    buPROPion (WELLBUTRIN XL) 150 MG extended release tablet 90 tablet 0     Sig: Take 1 tablet by mouth every morning    buPROPion (WELLBUTRIN XL) 300 MG extended release tablet 90 tablet 0     Sig: Take 1 tablet by mouth every morning       Date of last visit: 12/27/2023  Date of next visit (if applicable):4/10/2024  Pharmacy Name: Dalila Anderson MA

## 2024-04-06 RX ORDER — BUPROPION HYDROCHLORIDE 150 MG/1
150 TABLET ORAL EVERY MORNING
Qty: 90 TABLET | Refills: 0 | Status: SHIPPED | OUTPATIENT
Start: 2024-04-06

## 2024-04-06 RX ORDER — BUPROPION HYDROCHLORIDE 300 MG/1
300 TABLET ORAL EVERY MORNING
Qty: 90 TABLET | Refills: 0 | Status: SHIPPED | OUTPATIENT
Start: 2024-04-06

## 2024-04-19 DIAGNOSIS — K21.9 GASTROESOPHAGEAL REFLUX DISEASE, UNSPECIFIED WHETHER ESOPHAGITIS PRESENT: ICD-10-CM

## 2024-04-22 RX ORDER — OMEPRAZOLE 40 MG/1
40 CAPSULE, DELAYED RELEASE ORAL DAILY
Qty: 30 CAPSULE | Refills: 11 | OUTPATIENT
Start: 2024-04-22 | End: 2025-04-17

## 2024-06-07 ENCOUNTER — PATIENT MESSAGE (OUTPATIENT)
Dept: FAMILY MEDICINE CLINIC | Age: 49
End: 2024-06-07

## 2024-06-07 DIAGNOSIS — Z87.440 RECENT URINARY TRACT INFECTION: Primary | ICD-10-CM

## 2024-06-10 NOTE — TELEPHONE ENCOUNTER
From: Humphrey Woodward  To: Velma Bermudez  Sent: 6/7/2024 8:54 PM EDT  Subject: My wife    Hi. I know Anai messaged you so no need to rehash all of it. But I’m wondering if I should be tested for another UTI as well? Is it possible it’s still that one I must have had for months and months? I feel ok but she keeps getting these painful symptoms and we are thinking I may be giving it to her each time.

## 2024-06-21 DIAGNOSIS — N52.9 ERECTILE DYSFUNCTION, UNSPECIFIED ERECTILE DYSFUNCTION TYPE: ICD-10-CM

## 2024-06-21 RX ORDER — SILDENAFIL CITRATE 20 MG/1
TABLET ORAL
Qty: 50 TABLET | Refills: 11 | Status: SHIPPED | OUTPATIENT
Start: 2024-06-21

## 2024-06-21 NOTE — TELEPHONE ENCOUNTER
Recent Visits  Date Type Provider Dept   02/26/24 Office Visit Velma Bermudez, APRN - CNP [facility]   12/26/23 Office Visit Velma Bermudez, APRN - CNP [facility]   12/14/23 Office Visit Velma Bermudez, APRN - CNP [facility]   06/22/23 Office Visit Velma Bermudez, APRN - CNP [facility]   Showing recent visits within past 540 days with a meds authorizing provider and meeting all other requirements  Future Appointments  No visits were found meeting these conditions.  Showing future appointments within next 150 days with a meds authorizing provider and meeting all other requirements

## 2024-07-20 DIAGNOSIS — F32.A DEPRESSION, UNSPECIFIED DEPRESSION TYPE: ICD-10-CM

## 2024-07-22 DIAGNOSIS — K21.9 GASTROESOPHAGEAL REFLUX DISEASE, UNSPECIFIED WHETHER ESOPHAGITIS PRESENT: ICD-10-CM

## 2024-07-22 RX ORDER — BUPROPION HYDROCHLORIDE 300 MG/1
300 TABLET ORAL EVERY MORNING
Qty: 90 TABLET | Refills: 0 | OUTPATIENT
Start: 2024-07-22

## 2024-07-22 RX ORDER — BUPROPION HYDROCHLORIDE 150 MG/1
150 TABLET ORAL EVERY MORNING
Qty: 90 TABLET | Refills: 0 | OUTPATIENT
Start: 2024-07-22

## 2024-07-22 RX ORDER — OMEPRAZOLE 40 MG/1
40 CAPSULE, DELAYED RELEASE ORAL DAILY
Qty: 30 CAPSULE | Refills: 11 | Status: SHIPPED | OUTPATIENT
Start: 2024-07-22

## 2024-07-22 NOTE — TELEPHONE ENCOUNTER
Recent Visits  Date Type Provider Dept   02/26/24 Office Visit Velma Bermudez, APRN - CNP [facility]   12/26/23 Office Visit Velma Bermudez, APRN - CNP [facility]   12/14/23 Office Visit Velma Bermuedz, APRN - CNP [facility]   06/22/23 Office Visit Velma Bermudez, APRN - CNP [facility]   Showing recent visits within past 540 days with a meds authorizing provider and meeting all other requirements  Future Appointments  No visits were found meeting these conditions.  Showing future appointments within next 150 days with a meds authorizing provider and meeting all other requirements

## 2024-08-04 DIAGNOSIS — F32.A DEPRESSION, UNSPECIFIED DEPRESSION TYPE: ICD-10-CM

## 2024-08-05 RX ORDER — BUPROPION HYDROCHLORIDE 150 MG/1
150 TABLET ORAL EVERY MORNING
Qty: 90 TABLET | Refills: 0 | OUTPATIENT
Start: 2024-08-05

## 2024-08-05 NOTE — TELEPHONE ENCOUNTER
Humphrey ESCOBEDO Parent is requesting a refill on the following medications:  Requested Prescriptions     Pending Prescriptions Disp Refills    buPROPion (WELLBUTRIN XL) 150 MG extended release tablet 90 tablet 0     Sig: Take 1 tablet by mouth every morning    buPROPion (WELLBUTRIN XL) 300 MG extended release tablet 90 tablet 0     Sig: Take 1 tablet by mouth every morning       Date of last visit: 12/27/2023  Date of next visit (if applicable):8/14/2024  Pharmacy Name: Dalila Anderson MA

## 2024-08-10 RX ORDER — BUPROPION HYDROCHLORIDE 300 MG/1
300 TABLET ORAL EVERY MORNING
Qty: 90 TABLET | Refills: 0 | Status: SHIPPED | OUTPATIENT
Start: 2024-08-10

## 2024-08-10 RX ORDER — BUPROPION HYDROCHLORIDE 150 MG/1
150 TABLET ORAL EVERY MORNING
Qty: 90 TABLET | Refills: 0 | Status: SHIPPED | OUTPATIENT
Start: 2024-08-10

## 2024-08-14 ENCOUNTER — TELEMEDICINE (OUTPATIENT)
Dept: PSYCHIATRY | Age: 49
End: 2024-08-14

## 2024-08-14 DIAGNOSIS — F33.0 MILD EPISODE OF RECURRENT MAJOR DEPRESSIVE DISORDER (HCC): Primary | ICD-10-CM

## 2024-08-14 DIAGNOSIS — F41.1 GAD (GENERALIZED ANXIETY DISORDER): ICD-10-CM

## 2024-08-14 NOTE — PROGRESS NOTES
Humphrey Woodward, was evaluated through a synchronous (real-time) audio-video encounter. The patient (or guardian if applicable) is aware that this is a billable service, which includes applicable co-pays. This Virtual Visit was conducted with patient's (and/or legal guardian's) consent. Patient identification was verified, and a caregiver was present when appropriate.   The patient was located at Home: [address] [address]  Provider was located at Facility (Appt Dept[facility] [address]  [place] [address]  Confirm you are appropriately licensed, registered, or certified to deliver care in the state where the patient is located as indicated above. If you are not or unsure, please re-schedule the visit: Yes, I confirm.      Total time spent for this encounter: Not billed by time    --Ben Cameron, APRN - CNP on 8/14/2024 at 6:45 PM    An electronic signature was used to authenticate this note.       Progress Note                                                                                   CC: Major Depressive D/O recurrent Mild  JOHN     HPI   Humphrey is seen virtually for F/U and medication management. Reports Wellbutrin continues to work well. Denies having side effects. Good med compliance is reported. Denies having depression or anxiety. Denies feelings of harm towards self or others. Reports appetite and sleep remains \"good\" States he continues to get along well with wife. 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 But started counseling with Dr Root and reports this helps. Reports still seeing his  15 y/o son who still lives with his mother, more often and remains ahappy about this. Reports has been busy getting kids to soccer.. CBC with diff, CMP,  TSH UA reviwed drawn 12-15-23 reflect no critical abnormals      Past Medical Hx:     Past Medical History           Past Medical History:   Diagnosis Date    Chronic back pain

## 2024-11-01 ENCOUNTER — OFFICE VISIT (OUTPATIENT)
Dept: FAMILY MEDICINE CLINIC | Age: 49
End: 2024-11-01
Payer: COMMERCIAL

## 2024-11-01 VITALS
SYSTOLIC BLOOD PRESSURE: 124 MMHG | OXYGEN SATURATION: 96 % | TEMPERATURE: 97.3 F | WEIGHT: 197.4 LBS | BODY MASS INDEX: 26.16 KG/M2 | DIASTOLIC BLOOD PRESSURE: 68 MMHG | HEIGHT: 73 IN | HEART RATE: 51 BPM | RESPIRATION RATE: 14 BRPM

## 2024-11-01 DIAGNOSIS — F41.9 ANXIETY: Primary | ICD-10-CM

## 2024-11-01 DIAGNOSIS — F41.0 PANIC ATTACK: ICD-10-CM

## 2024-11-01 PROCEDURE — G8484 FLU IMMUNIZE NO ADMIN: HCPCS | Performed by: NURSE PRACTITIONER

## 2024-11-01 PROCEDURE — 99213 OFFICE O/P EST LOW 20 MIN: CPT | Performed by: NURSE PRACTITIONER

## 2024-11-01 PROCEDURE — G8419 CALC BMI OUT NRM PARAM NOF/U: HCPCS | Performed by: NURSE PRACTITIONER

## 2024-11-01 PROCEDURE — 1036F TOBACCO NON-USER: CPT | Performed by: NURSE PRACTITIONER

## 2024-11-01 PROCEDURE — G8427 DOCREV CUR MEDS BY ELIG CLIN: HCPCS | Performed by: NURSE PRACTITIONER

## 2024-11-01 RX ORDER — ALPRAZOLAM 0.25 MG/1
0.25 TABLET ORAL DAILY PRN
Qty: 10 TABLET | Refills: 0 | Status: SHIPPED | OUTPATIENT
Start: 2024-11-01 | End: 2024-11-11

## 2024-11-01 NOTE — PROGRESS NOTES
Humphrey Woodward is a 49 y.o. male thatpresents for Anxiety      History obtained today from Patient.    HPI    Depression  On Wellbutrin 450 mg XL      Anxiety  Having more panic attacks  First time in his life  Never had them before  Last year had one  Walking through airport and in a hurry  Had one a month ago, went to baseball game, crowded  Suddenly couldn't breath right, cold sweats  Take a while to go away  And happened again last week  Sleeping ok  No significant life changes  Feels like Wellbutrin is controlling his moods ok, 450 mg is helpful  Anxiety is not any more than normal      Used Xanax prn for traveling in the past and it has helped    I have reviewed the patient's past medical history, past surgical history, allergies,medications, social and family history and I have made updates where appropriate.    Past Medical History:   Diagnosis Date    Chronic back pain     Controlled type 2 diabetes mellitus without complication, without long-term current use of insulin ([facility]) 7/23/2020       Social History     Tobacco Use    Smoking status: Never    Smokeless tobacco: Former     Types: Chew   Substance Use Topics    Alcohol use: No    Drug use: No       Family History   Problem Relation Age of Onset    Diabetes Mother     Thyroid Disease Mother     Obesity Mother     Obesity Father     Diabetes Sister     Obesity Sister     Diabetes Maternal Grandmother     Obesity Maternal Grandmother     Cancer Paternal Grandmother     Heart Disease Paternal Grandfather          Review of Systems    Review of Systems    Constitutional: Negative for Fever, Chills, Fatigue  Cardiovascular:  Negative forChest Pain, Palpitations  Respiratory:  Negative for Cough, Wheezing, Shortness of breath  Gastrointestinal:  Nausea, Vomiting, Diarrhea, Constipation, Blood in stool  Genitourinary:  Negative for Difficulty or painful urination,Change in frequency, Urgency  Skin:  Negative for Color change, Rash, Itching, Wound  Psychiatric:

## 2024-11-25 DIAGNOSIS — F32.A DEPRESSION, UNSPECIFIED DEPRESSION TYPE: ICD-10-CM

## 2024-11-25 RX ORDER — BUPROPION HYDROCHLORIDE 150 MG/1
150 TABLET ORAL EVERY MORNING
Qty: 90 TABLET | Refills: 0 | OUTPATIENT
Start: 2024-11-25

## 2024-11-26 RX ORDER — BUPROPION HYDROCHLORIDE 150 MG/1
150 TABLET ORAL EVERY MORNING
Qty: 90 TABLET | Refills: 0 | OUTPATIENT
Start: 2024-11-26

## 2024-11-26 RX ORDER — BUPROPION HYDROCHLORIDE 300 MG/1
300 TABLET ORAL EVERY MORNING
Qty: 90 TABLET | Refills: 0 | OUTPATIENT
Start: 2024-11-26

## 2024-12-30 ENCOUNTER — HOSPITAL ENCOUNTER (OUTPATIENT)
Age: 49
Discharge: HOME | End: 2024-12-30
Payer: COMMERCIAL

## 2024-12-30 DIAGNOSIS — R35.0 URINARY FREQUENCY: ICD-10-CM

## 2024-12-30 PROCEDURE — 87077 CULTURE AEROBIC IDENTIFY: CPT

## 2024-12-30 PROCEDURE — 87186 SC STD MICRODIL/AGAR DIL: CPT

## 2024-12-30 PROCEDURE — 87086 URINE CULTURE/COLONY COUNT: CPT

## 2024-12-30 PROCEDURE — 81001 URINALYSIS AUTO W/SCOPE: CPT

## 2024-12-31 ENCOUNTER — TELEPHONE (OUTPATIENT)
Dept: FAMILY MEDICINE CLINIC | Age: 49
End: 2024-12-31

## 2024-12-31 LAB
BACTERIA URNS QL MICRO: ABNORMAL /HPF
BILIRUB UR QL STRIP.AUTO: ABNORMAL
CASTS #/AREA URNS LPF: ABNORMAL /LPF
CASTS 2: ABNORMAL /LPF
CHARACTER UR: ABNORMAL
COLOR, UA: ABNORMAL
CRYSTALS URNS MICRO: ABNORMAL
EPITHELIAL CELLS, UA: ABNORMAL /HPF
GLUCOSE UR QL STRIP.AUTO: NEGATIVE MG/DL
HGB UR QL STRIP.AUTO: ABNORMAL
KETONES UR QL STRIP.AUTO: NEGATIVE
MISCELLANEOUS 2: ABNORMAL
NITRITE UR QL STRIP: POSITIVE
PH UR STRIP.AUTO: 7 [PH] (ref 5–9)
PROT UR STRIP.AUTO-MCNC: 300 MG/DL
RBC URINE: ABNORMAL /HPF
RENAL EPI CELLS #/AREA URNS HPF: ABNORMAL /[HPF]
SP GR UR REFRACT.AUTO: 1.02 (ref 1–1.03)
UROBILINOGEN, URINE: 2 EU/DL (ref 0–1)
WBC #/AREA URNS HPF: > 200 /HPF
WBC #/AREA URNS HPF: ABNORMAL /[HPF]
YEAST LIKE FUNGI URNS QL MICRO: ABNORMAL

## 2024-12-31 NOTE — TELEPHONE ENCOUNTER
Left message on answering machine. Requested pt to call back at [phone], at their earliest convenience.

## 2024-12-31 NOTE — TELEPHONE ENCOUNTER
----- Message from SWAPNIL Mehta CNP sent at 12/31/2024  7:48 AM EST -----  Urine looks positive will see what culture shows

## 2024-12-31 NOTE — TELEPHONE ENCOUNTER
Pt informed of lab results. Pt voiced understanding with no further questions at this time.       Pt stated he is feeling much better

## 2025-01-02 ENCOUNTER — TELEPHONE (OUTPATIENT)
Dept: FAMILY MEDICINE CLINIC | Age: 50
End: 2025-01-02

## 2025-01-02 NOTE — TELEPHONE ENCOUNTER
----- Message from SWAPNIL Mehta CNP sent at 1/2/2025  7:15 AM EST -----  He had bacteria in urine. Sensitive to cipro, so the antibiotic should work. Continue until completion

## 2025-01-03 ENCOUNTER — TELEPHONE (OUTPATIENT)
Dept: FAMILY MEDICINE CLINIC | Age: 50
End: 2025-01-03

## 2025-01-03 LAB
BACTERIA UR CULT: ABNORMAL
BACTERIA UR CULT: ABNORMAL
ORGANISM: ABNORMAL
ORGANISM: ABNORMAL

## 2025-01-03 NOTE — TELEPHONE ENCOUNTER
----- Message from SWAPNIL Mehta CNP sent at 1/3/2025  3:38 PM EST -----  Has another organism in his urine, streptococcus. Its low colony count  So as long as symptoms are improving, ok to just do the Cipro for now  If symptoms return let me know

## 2025-01-07 DIAGNOSIS — N52.9 ERECTILE DYSFUNCTION, UNSPECIFIED ERECTILE DYSFUNCTION TYPE: ICD-10-CM

## 2025-01-07 RX ORDER — SILDENAFIL CITRATE 20 MG/1
TABLET ORAL
Qty: 50 TABLET | Refills: 5 | Status: SHIPPED | OUTPATIENT
Start: 2025-01-07

## 2025-01-07 NOTE — TELEPHONE ENCOUNTER
Recent Visits  Date Type Provider Dept   11/01/24 Office Visit Velma Bermudez, APRN - CNP [facility]   02/26/24 Office Visit Velma Bermudez, APRN - CNP [facility]   12/26/23 Office Visit Velma Bermudez, APRN - CNP [facility]   12/14/23 Office Visit Velma Bermudez, APRN - CNP [facility]   Showing recent visits within past 540 days with a meds authorizing provider and meeting all other requirements  Future Appointments  No visits were found meeting these conditions.  Showing future appointments within next 150 days with a meds authorizing provider and meeting all other requirements

## 2025-02-10 DIAGNOSIS — R35.0 URINARY FREQUENCY: Primary | ICD-10-CM

## 2025-02-10 RX ORDER — CIPROFLOXACIN 500 MG/1
TABLET, FILM COATED ORAL
Qty: 14 TABLET | Refills: 0 | Status: SHIPPED | OUTPATIENT
Start: 2025-02-10

## 2025-04-14 DIAGNOSIS — N52.9 ERECTILE DYSFUNCTION, UNSPECIFIED ERECTILE DYSFUNCTION TYPE: ICD-10-CM

## 2025-04-14 RX ORDER — SILDENAFIL CITRATE 20 MG/1
TABLET ORAL
Qty: 50 TABLET | Refills: 0 | OUTPATIENT
Start: 2025-04-14

## 2025-04-14 RX ORDER — SILDENAFIL CITRATE 20 MG/1
TABLET ORAL
Qty: 50 TABLET | Refills: 11 | Status: SHIPPED | OUTPATIENT
Start: 2025-04-14

## 2025-04-14 NOTE — TELEPHONE ENCOUNTER
Recent Visits  Date Type Provider Dept   11/01/24 Office Visit Velma Bermudez, APRN - CNP [facility]   02/26/24 Office Visit Velma Bermudez, APRN - CNP [facility]   12/26/23 Office Visit Velma Bermudez, APRN - CNP [facility]   12/14/23 Office Visit Velma Bermudez, APRN - CNP [facility]   Showing recent visits within past 540 days with a meds authorizing provider and meeting all other requirements  Future Appointments  No visits were found meeting these conditions.  Showing future appointments within next 150 days with a meds authorizing provider and meeting all other requirements      Opioid Pregnancy And Lactation Text: These medications can lead to premature delivery and should be avoided during pregnancy. These medications are also present in breast milk in small amounts.

## 2025-08-03 DIAGNOSIS — K21.9 GASTROESOPHAGEAL REFLUX DISEASE, UNSPECIFIED WHETHER ESOPHAGITIS PRESENT: ICD-10-CM

## 2025-08-04 PROBLEM — M75.52 BURSITIS OF LEFT SHOULDER: Status: RESOLVED | Noted: 2023-12-14 | Resolved: 2025-08-04

## 2025-08-04 PROBLEM — E66.01 SEVERE OBESITY (BMI 35.0-39.9) WITH COMORBIDITY (HCC): Status: RESOLVED | Noted: 2020-07-23 | Resolved: 2025-08-04

## 2025-08-04 RX ORDER — OMEPRAZOLE 40 MG/1
40 CAPSULE, DELAYED RELEASE ORAL DAILY
Qty: 90 CAPSULE | Refills: 3 | Status: SHIPPED | OUTPATIENT
Start: 2025-08-04

## 2025-08-22 ENCOUNTER — OFFICE VISIT (OUTPATIENT)
Dept: FAMILY MEDICINE CLINIC | Age: 50
End: 2025-08-22
Payer: COMMERCIAL

## 2025-08-22 VITALS
HEIGHT: 73 IN | WEIGHT: 190.6 LBS | RESPIRATION RATE: 14 BRPM | TEMPERATURE: 97.5 F | HEART RATE: 67 BPM | OXYGEN SATURATION: 96 % | BODY MASS INDEX: 25.26 KG/M2 | DIASTOLIC BLOOD PRESSURE: 72 MMHG | SYSTOLIC BLOOD PRESSURE: 124 MMHG

## 2025-08-22 DIAGNOSIS — Z23 NEED FOR VACCINATION: ICD-10-CM

## 2025-08-22 DIAGNOSIS — F32.A DEPRESSION, UNSPECIFIED DEPRESSION TYPE: ICD-10-CM

## 2025-08-22 DIAGNOSIS — F41.1 GAD (GENERALIZED ANXIETY DISORDER): Primary | ICD-10-CM

## 2025-08-22 PROCEDURE — 99214 OFFICE O/P EST MOD 30 MIN: CPT | Performed by: NURSE PRACTITIONER

## 2025-08-22 PROCEDURE — 1036F TOBACCO NON-USER: CPT | Performed by: NURSE PRACTITIONER

## 2025-08-22 PROCEDURE — 90715 TDAP VACCINE 7 YRS/> IM: CPT | Performed by: NURSE PRACTITIONER

## 2025-08-22 PROCEDURE — 90471 IMMUNIZATION ADMIN: CPT | Performed by: NURSE PRACTITIONER

## 2025-08-22 PROCEDURE — G8427 DOCREV CUR MEDS BY ELIG CLIN: HCPCS | Performed by: NURSE PRACTITIONER

## 2025-08-22 PROCEDURE — G8419 CALC BMI OUT NRM PARAM NOF/U: HCPCS | Performed by: NURSE PRACTITIONER

## 2025-08-22 RX ORDER — BUSPIRONE HYDROCHLORIDE 5 MG/1
5 TABLET ORAL 3 TIMES DAILY
Qty: 90 TABLET | Refills: 0 | Status: SHIPPED | OUTPATIENT
Start: 2025-08-22 | End: 2025-09-21

## 2025-08-22 RX ORDER — DULOXETIN HYDROCHLORIDE 30 MG/1
30 CAPSULE, DELAYED RELEASE ORAL DAILY
Qty: 30 CAPSULE | Refills: 1 | Status: SHIPPED | OUTPATIENT
Start: 2025-08-22

## 2025-08-22 ASSESSMENT — COLUMBIA-SUICIDE SEVERITY RATING SCALE - C-SSRS
1. IN THE PAST MONTH, HAVE YOU WISHED YOU WERE DEAD OR WISHED YOU COULD GO TO SLEEP AND NOT WAKE UP?: YES
6. IN YOUR LIFETIME, HAVE YOU EVER DONE ANYTHING, STARTED TO DO ANYTHING, OR PREPARED TO DO ANYTHING TO END YOUR LIFE?: NO
5. IN THE PAST MONTH, HAVE YOU STARTED TO WORK OUT OR WORKED OUT THE DETAILS OF HOW TO KILL YOURSELF? DO YOU INTEND TO CARRY OUT THIS PLAN?: NO
3. IN THE PAST MONTH, HAVE YOU BEEN THINKING ABOUT HOW YOU MIGHT KILL YOURSELF?: YES
4. IN THE PAST MONTH, HAVE YOU HAD THESE THOUGHTS AND HAD SOME INTENTION OF ACTING ON THEM?: NO
2. IN THE PAST MONTH, HAVE YOU ACTUALLY HAD ANY THOUGHTS OF KILLING YOURSELF?: YES

## 2025-08-22 ASSESSMENT — PATIENT HEALTH QUESTIONNAIRE - PHQ9
4. FEELING TIRED OR HAVING LITTLE ENERGY: SEVERAL DAYS
SUM OF ALL RESPONSES TO PHQ QUESTIONS 1-9: 13
SUM OF ALL RESPONSES TO PHQ QUESTIONS 1-9: 15
1. LITTLE INTEREST OR PLEASURE IN DOING THINGS: MORE THAN HALF THE DAYS
2. FEELING DOWN, DEPRESSED OR HOPELESS: NEARLY EVERY DAY
3. TROUBLE FALLING OR STAYING ASLEEP: SEVERAL DAYS
SUM OF ALL RESPONSES TO PHQ QUESTIONS 1-9: 15
8. MOVING OR SPEAKING SO SLOWLY THAT OTHER PEOPLE COULD HAVE NOTICED. OR THE OPPOSITE - BEING SO FIDGETY OR RESTLESS THAT YOU HAVE BEEN MOVING AROUND A LOT MORE THAN USUAL: NOT AT ALL
8. MOVING OR SPEAKING SO SLOWLY THAT OTHER PEOPLE COULD HAVE NOTICED. OR THE OPPOSITE, BEING SO FIGETY OR RESTLESS THAT YOU HAVE BEEN MOVING AROUND A LOT MORE THAN USUAL: NOT AT ALL
4. FEELING TIRED OR HAVING LITTLE ENERGY: SEVERAL DAYS
SUM OF ALL RESPONSES TO PHQ QUESTIONS 1-9: 15
7. TROUBLE CONCENTRATING ON THINGS, SUCH AS READING THE NEWSPAPER OR WATCHING TELEVISION: MORE THAN HALF THE DAYS
1. LITTLE INTEREST OR PLEASURE IN DOING THINGS: MORE THAN HALF THE DAYS
10. IF YOU CHECKED OFF ANY PROBLEMS, HOW DIFFICULT HAVE THESE PROBLEMS MADE IT FOR YOU TO DO YOUR WORK, TAKE CARE OF THINGS AT HOME, OR GET ALONG WITH OTHER PEOPLE: VERY DIFFICULT
9. THOUGHTS THAT YOU WOULD BE BETTER OFF DEAD, OR OF HURTING YOURSELF: MORE THAN HALF THE DAYS
2. FEELING DOWN, DEPRESSED OR HOPELESS: NEARLY EVERY DAY
6. FEELING BAD ABOUT YOURSELF - OR THAT YOU ARE A FAILURE OR HAVE LET YOURSELF OR YOUR FAMILY DOWN: NEARLY EVERY DAY
7. TROUBLE CONCENTRATING ON THINGS, SUCH AS READING THE NEWSPAPER OR WATCHING TELEVISION: MORE THAN HALF THE DAYS
3. TROUBLE FALLING OR STAYING ASLEEP: SEVERAL DAYS
10. IF YOU CHECKED OFF ANY PROBLEMS, HOW DIFFICULT HAVE THESE PROBLEMS MADE IT FOR YOU TO DO YOUR WORK, TAKE CARE OF THINGS AT HOME, OR GET ALONG WITH OTHER PEOPLE: VERY DIFFICULT
5. POOR APPETITE OR OVEREATING: SEVERAL DAYS
5. POOR APPETITE OR OVEREATING: SEVERAL DAYS
9. THOUGHTS THAT YOU WOULD BE BETTER OFF DEAD, OR OF HURTING YOURSELF: MORE THAN HALF THE DAYS
SUM OF ALL RESPONSES TO PHQ QUESTIONS 1-9: 15
6. FEELING BAD ABOUT YOURSELF - OR THAT YOU ARE A FAILURE OR HAVE LET YOURSELF OR YOUR FAMILY DOWN: NEARLY EVERY DAY

## (undated) DEVICE — PACK,UNIVERSAL,NO GOWNS: Brand: MEDLINE

## (undated) DEVICE — BASIC SINGLE BASIN BTC-LF: Brand: MEDLINE INDUSTRIES, INC.

## (undated) DEVICE — CANNULA SEAL

## (undated) DEVICE — CHLORAPREP 26ML ORANGE

## (undated) DEVICE — SUREFIT, DUAL DISPERSIVE ELECTRODE, CONTACT QUALITY MONITOR: Brand: SUREFIT

## (undated) DEVICE — GOWN,SIRUS,NONRNF,SETINSLV,XL,20/CS: Brand: MEDLINE

## (undated) DEVICE — BLADELESS OBTURATOR: Brand: WECK VISTA

## (undated) DEVICE — TROCARS: Brand: KII® OPTICAL ACCESS SYSTEM

## (undated) DEVICE — TIP APPL L35CM RIG FOR SEAL EVICEL

## (undated) DEVICE — COLUMN DRAPE

## (undated) DEVICE — [HIGH FLOW INSUFFLATOR,  DO NOT USE IF PACKAGE IS DAMAGED,  KEEP DRY,  KEEP AWAY FROM SUNLIGHT,  PROTECT FROM HEAT AND RADIOACTIVE SOURCES.]: Brand: PNEUMOSURE

## (undated) DEVICE — SYRINGE MED 30ML STD CLR PLAS LUERLOCK TIP N CTRL DISP

## (undated) DEVICE — 3M™ WARMING BLANKET, UPPER BODY, 10 PER CASE, 42268: Brand: BAIR HUGGER™

## (undated) DEVICE — SYRINGE MED 10ML LUERLOCK TIP W/O SFTY DISP

## (undated) DEVICE — VISIGI 3D®  CALIBRATION SYSTEM  SIZE 36FR STD W/ BULB: Brand: BOEHRINGER® VISIGI 3D™ SLEEVE GASTRECTOMY CALIBRATION SYSTEM, SIZE 36FR W/BULB

## (undated) DEVICE — MEDI-VAC YANKAUER SUCTION HANDLE W/BULBOUS TIP: Brand: CARDINAL HEALTH

## (undated) DEVICE — GOWN,SIRUS,NON REINFRCD,LARGE,SET IN SL: Brand: MEDLINE

## (undated) DEVICE — ELECTRO LUBE IS A SINGLE PATIENT USE DEVICE THAT IS INTENDED TO BE USED ON ELECTROSURGICAL ELECTRODES TO REDUCE STICKING.: Brand: KEY SURGICAL ELECTRO LUBE

## (undated) DEVICE — SOLUTION IV IRRIG POUR BRL 0.9% SODIUM CHL 2F7124

## (undated) DEVICE — CORE TRUMPET FOR SINGLE SOLUTION BAG: Brand: CORE DYNAMICS

## (undated) DEVICE — TETRA-FLEX CF WOVEN LATEX FREE ELASTIC BANDAGE 6" X 5.5 YD: Brand: TETRA-FLEX™CF

## (undated) DEVICE — PACK PROCEDURE SURG SET UP SRMC

## (undated) DEVICE — GAUZE,SPONGE,8"X4",12PLY,XRAY,STRL,LF: Brand: MEDLINE

## (undated) DEVICE — 2000CC GUARDIAN II: Brand: GUARDIAN

## (undated) DEVICE — STRIP,CLOSURE,WOUND,MEDI-STRIP,1/2X4: Brand: MEDLINE

## (undated) DEVICE — HYPODERMIC SAFETY NEEDLE: Brand: MAGELLAN

## (undated) DEVICE — SOLUTION IV 1000ML LAC RINGERS PH 6.5 INJ USP VIAFLX PLAS

## (undated) DEVICE — BINDER ABD M/L H12IN FOR 46-62IN WHT 4 SLD PNL DSGN HOOP

## (undated) DEVICE — SEALANT HEMSTAT 5ML HUM FIBRIN THROM 2 VI APPL DEV EVICEL

## (undated) DEVICE — ARM DRAPE

## (undated) DEVICE — TROCAR: Brand: KII FIOS FIRST ENTRY

## (undated) DEVICE — MEDI-VAC NON-CONDUCTIVE SUCTION TUBING 6MM X 6.1M (20 FT.) L: Brand: CARDINAL HEALTH

## (undated) DEVICE — INTENDED FOR TISSUE SEPARATION, AND OTHER PROCEDURES THAT REQUIRE A SHARP SURGICAL BLADE TO PUNCTURE OR CUT.: Brand: BARD-PARKER ® CARBON RIB-BACK BLADES

## (undated) DEVICE — SOLUTION ANTIFOG VIS SYS CLEARIFY LAPSCP

## (undated) DEVICE — BLADE CLIPPER GEN PURP NS

## (undated) DEVICE — GLOVE ORANGE PI 7   MSG9070

## (undated) DEVICE — BANDAGE ADH W1XL3IN NAT FAB WVN FLX DURABLE N ADH PD SEAL

## (undated) DEVICE — VESSEL SEALER: Brand: ENDOWRIST

## (undated) DEVICE — 3M™ STERI-STRIP™ COMPOUND BENZOIN TINCTURE 40 BAGS/CARTON 4 CARTONS/CASE C1544: Brand: 3M™ STERI-STRIP™